# Patient Record
Sex: MALE | Race: WHITE | Employment: OTHER | URBAN - METROPOLITAN AREA
[De-identification: names, ages, dates, MRNs, and addresses within clinical notes are randomized per-mention and may not be internally consistent; named-entity substitution may affect disease eponyms.]

---

## 2023-07-12 ENCOUNTER — APPOINTMENT (OUTPATIENT)
Dept: GENERAL RADIOLOGY | Age: 83
End: 2023-07-12
Payer: MEDICARE

## 2023-07-12 ENCOUNTER — APPOINTMENT (OUTPATIENT)
Dept: CT IMAGING | Age: 83
End: 2023-07-12
Payer: MEDICARE

## 2023-07-12 ENCOUNTER — HOSPITAL ENCOUNTER (INPATIENT)
Age: 83
LOS: 11 days | Discharge: NURSING, REHAB OR LONG-TERM CARE | DRG: 280 | End: 2023-07-23
Attending: INTERNAL MEDICINE | Admitting: SURGERY
Payer: MEDICARE

## 2023-07-12 ENCOUNTER — HOSPITAL ENCOUNTER (EMERGENCY)
Age: 83
Discharge: ANOTHER HOSPITAL | End: 2023-07-12
Attending: EMERGENCY MEDICINE
Payer: MEDICARE

## 2023-07-12 VITALS
SYSTOLIC BLOOD PRESSURE: 129 MMHG | OXYGEN SATURATION: 98 % | WEIGHT: 140 LBS | HEIGHT: 72 IN | DIASTOLIC BLOOD PRESSURE: 90 MMHG | RESPIRATION RATE: 26 BRPM | BODY MASS INDEX: 18.96 KG/M2 | HEART RATE: 75 BPM | TEMPERATURE: 97.5 F

## 2023-07-12 DIAGNOSIS — R77.8 ELEVATED TROPONIN: Primary | ICD-10-CM

## 2023-07-12 DIAGNOSIS — J90 PLEURAL EFFUSION, BILATERAL: ICD-10-CM

## 2023-07-12 DIAGNOSIS — K92.2 ACUTE UPPER GI BLEED: ICD-10-CM

## 2023-07-12 PROBLEM — I50.9 HEART FAILURE (HCC): Status: ACTIVE | Noted: 2023-07-12

## 2023-07-12 PROBLEM — I50.43 CHF (CONGESTIVE HEART FAILURE), NYHA CLASS I, ACUTE ON CHRONIC, COMBINED (HCC): Status: ACTIVE | Noted: 2023-07-12

## 2023-07-12 LAB
ALBUMIN SERPL-MCNC: 3.7 G/DL (ref 3.4–5)
ALBUMIN/GLOB SERPL: 1.3 {RATIO} (ref 1.1–2.2)
ALP SERPL-CCNC: 104 U/L (ref 40–129)
ALT SERPL-CCNC: 73 U/L (ref 10–40)
ANION GAP SERPL CALCULATED.3IONS-SCNC: 14 MMOL/L (ref 3–16)
AST SERPL-CCNC: 85 U/L (ref 15–37)
BASOPHILS # BLD: 0.1 K/UL (ref 0–0.2)
BASOPHILS NFR BLD: 0.8 %
BILIRUB SERPL-MCNC: 0.5 MG/DL (ref 0–1)
BUN SERPL-MCNC: 32 MG/DL (ref 7–20)
CALCIUM SERPL-MCNC: 9 MG/DL (ref 8.3–10.6)
CHLORIDE SERPL-SCNC: 100 MMOL/L (ref 99–110)
CO2 SERPL-SCNC: 20 MMOL/L (ref 21–32)
CREAT SERPL-MCNC: 1.6 MG/DL (ref 0.8–1.3)
DEPRECATED RDW RBC AUTO: 14.7 % (ref 12.4–15.4)
EKG ATRIAL RATE: 366 BPM
EKG DIAGNOSIS: NORMAL
EKG Q-T INTERVAL: 344 MS
EKG QRS DURATION: 86 MS
EKG QTC CALCULATION (BAZETT): 454 MS
EKG R AXIS: -41 DEGREES
EKG T AXIS: 242 DEGREES
EKG VENTRICULAR RATE: 105 BPM
EOSINOPHIL # BLD: 0 K/UL (ref 0–0.6)
EOSINOPHIL NFR BLD: 0.4 %
GFR SERPLBLD CREATININE-BSD FMLA CKD-EPI: 43 ML/MIN/{1.73_M2}
GLUCOSE SERPL-MCNC: 130 MG/DL (ref 70–99)
HCT VFR BLD AUTO: 38.2 % (ref 40.5–52.5)
HGB BLD-MCNC: 12.6 G/DL (ref 13.5–17.5)
LYMPHOCYTES # BLD: 1 K/UL (ref 1–5.1)
LYMPHOCYTES NFR BLD: 12.9 %
MCH RBC QN AUTO: 29.9 PG (ref 26–34)
MCHC RBC AUTO-ENTMCNC: 33 G/DL (ref 31–36)
MCV RBC AUTO: 90.7 FL (ref 80–100)
MONOCYTES # BLD: 0.8 K/UL (ref 0–1.3)
MONOCYTES NFR BLD: 10.1 %
NEUTROPHILS # BLD: 5.9 K/UL (ref 1.7–7.7)
NEUTROPHILS NFR BLD: 75.8 %
PLATELET # BLD AUTO: 217 K/UL (ref 135–450)
PMV BLD AUTO: 9 FL (ref 5–10.5)
POTASSIUM SERPL-SCNC: 4.5 MMOL/L (ref 3.5–5.1)
PROT SERPL-MCNC: 6.6 G/DL (ref 6.4–8.2)
RBC # BLD AUTO: 4.21 M/UL (ref 4.2–5.9)
SODIUM SERPL-SCNC: 134 MMOL/L (ref 136–145)
TROPONIN, HIGH SENSITIVITY: 410 NG/L (ref 0–22)
TROPONIN, HIGH SENSITIVITY: 487 NG/L (ref 0–22)
WBC # BLD AUTO: 7.8 K/UL (ref 4–11)

## 2023-07-12 PROCEDURE — 71045 X-RAY EXAM CHEST 1 VIEW: CPT

## 2023-07-12 PROCEDURE — 96374 THER/PROPH/DIAG INJ IV PUSH: CPT

## 2023-07-12 PROCEDURE — 80053 COMPREHEN METABOLIC PANEL: CPT

## 2023-07-12 PROCEDURE — 6360000004 HC RX CONTRAST MEDICATION: Performed by: EMERGENCY MEDICINE

## 2023-07-12 PROCEDURE — 93005 ELECTROCARDIOGRAM TRACING: CPT | Performed by: EMERGENCY MEDICINE

## 2023-07-12 PROCEDURE — 84484 ASSAY OF TROPONIN QUANT: CPT

## 2023-07-12 PROCEDURE — 6360000002 HC RX W HCPCS: Performed by: EMERGENCY MEDICINE

## 2023-07-12 PROCEDURE — 71260 CT THORAX DX C+: CPT

## 2023-07-12 PROCEDURE — 1200000000 HC SEMI PRIVATE

## 2023-07-12 PROCEDURE — 2580000003 HC RX 258: Performed by: SURGERY

## 2023-07-12 PROCEDURE — 2060000000 HC ICU INTERMEDIATE R&B

## 2023-07-12 PROCEDURE — 36415 COLL VENOUS BLD VENIPUNCTURE: CPT

## 2023-07-12 PROCEDURE — 6370000000 HC RX 637 (ALT 250 FOR IP): Performed by: EMERGENCY MEDICINE

## 2023-07-12 PROCEDURE — 6370000000 HC RX 637 (ALT 250 FOR IP): Performed by: SURGERY

## 2023-07-12 PROCEDURE — 99285 EMERGENCY DEPT VISIT HI MDM: CPT

## 2023-07-12 PROCEDURE — 85025 COMPLETE CBC W/AUTO DIFF WBC: CPT

## 2023-07-12 PROCEDURE — 93010 ELECTROCARDIOGRAM REPORT: CPT | Performed by: INTERNAL MEDICINE

## 2023-07-12 RX ORDER — POTASSIUM CHLORIDE 20 MEQ/1
40 TABLET, EXTENDED RELEASE ORAL PRN
Status: DISCONTINUED | OUTPATIENT
Start: 2023-07-12 | End: 2023-07-23 | Stop reason: HOSPADM

## 2023-07-12 RX ORDER — FUROSEMIDE 10 MG/ML
40 INJECTION INTRAMUSCULAR; INTRAVENOUS ONCE
Status: COMPLETED | OUTPATIENT
Start: 2023-07-12 | End: 2023-07-12

## 2023-07-12 RX ORDER — ONDANSETRON 4 MG/1
4 TABLET, ORALLY DISINTEGRATING ORAL EVERY 8 HOURS PRN
Status: DISCONTINUED | OUTPATIENT
Start: 2023-07-12 | End: 2023-07-23 | Stop reason: HOSPADM

## 2023-07-12 RX ORDER — POLYETHYLENE GLYCOL 3350 17 G/17G
17 POWDER, FOR SOLUTION ORAL DAILY PRN
Status: DISCONTINUED | OUTPATIENT
Start: 2023-07-12 | End: 2023-07-23 | Stop reason: HOSPADM

## 2023-07-12 RX ORDER — POTASSIUM CHLORIDE 7.45 MG/ML
10 INJECTION INTRAVENOUS PRN
Status: DISCONTINUED | OUTPATIENT
Start: 2023-07-12 | End: 2023-07-12 | Stop reason: SDUPTHER

## 2023-07-12 RX ORDER — MAGNESIUM SULFATE IN WATER 40 MG/ML
2000 INJECTION, SOLUTION INTRAVENOUS PRN
Status: DISCONTINUED | OUTPATIENT
Start: 2023-07-12 | End: 2023-07-23 | Stop reason: HOSPADM

## 2023-07-12 RX ORDER — ACETAMINOPHEN 325 MG/1
650 TABLET ORAL EVERY 6 HOURS PRN
Status: DISCONTINUED | OUTPATIENT
Start: 2023-07-12 | End: 2023-07-23 | Stop reason: HOSPADM

## 2023-07-12 RX ORDER — 0.9 % SODIUM CHLORIDE 0.9 %
1000 INTRAVENOUS SOLUTION INTRAVENOUS ONCE
Status: DISCONTINUED | OUTPATIENT
Start: 2023-07-12 | End: 2023-07-12

## 2023-07-12 RX ORDER — ONDANSETRON 2 MG/ML
4 INJECTION INTRAMUSCULAR; INTRAVENOUS EVERY 6 HOURS PRN
Status: DISCONTINUED | OUTPATIENT
Start: 2023-07-12 | End: 2023-07-23 | Stop reason: HOSPADM

## 2023-07-12 RX ORDER — ONDANSETRON 4 MG/1
4 TABLET, ORALLY DISINTEGRATING ORAL EVERY 8 HOURS PRN
Status: DISCONTINUED | OUTPATIENT
Start: 2023-07-12 | End: 2023-07-12 | Stop reason: SDUPTHER

## 2023-07-12 RX ORDER — POTASSIUM CHLORIDE 7.45 MG/ML
10 INJECTION INTRAVENOUS PRN
Status: DISCONTINUED | OUTPATIENT
Start: 2023-07-12 | End: 2023-07-23 | Stop reason: HOSPADM

## 2023-07-12 RX ORDER — SODIUM CHLORIDE 0.9 % (FLUSH) 0.9 %
5-40 SYRINGE (ML) INJECTION EVERY 12 HOURS SCHEDULED
Status: DISCONTINUED | OUTPATIENT
Start: 2023-07-12 | End: 2023-07-23 | Stop reason: HOSPADM

## 2023-07-12 RX ORDER — ACETAMINOPHEN 650 MG/1
650 SUPPOSITORY RECTAL EVERY 6 HOURS PRN
Status: DISCONTINUED | OUTPATIENT
Start: 2023-07-12 | End: 2023-07-23 | Stop reason: HOSPADM

## 2023-07-12 RX ORDER — ASPIRIN 81 MG/1
324 TABLET, CHEWABLE ORAL ONCE
Status: COMPLETED | OUTPATIENT
Start: 2023-07-12 | End: 2023-07-12

## 2023-07-12 RX ORDER — FUROSEMIDE 10 MG/ML
40 INJECTION INTRAMUSCULAR; INTRAVENOUS 2 TIMES DAILY
Status: DISCONTINUED | OUTPATIENT
Start: 2023-07-13 | End: 2023-07-13

## 2023-07-12 RX ORDER — METOPROLOL TARTRATE 50 MG/1
50 TABLET, FILM COATED ORAL ONCE
Status: COMPLETED | OUTPATIENT
Start: 2023-07-12 | End: 2023-07-12

## 2023-07-12 RX ORDER — ASPIRIN 81 MG/1
81 TABLET, CHEWABLE ORAL DAILY
Status: ON HOLD | COMMUNITY

## 2023-07-12 RX ORDER — POTASSIUM CHLORIDE 20 MEQ/1
40 TABLET, EXTENDED RELEASE ORAL PRN
Status: DISCONTINUED | OUTPATIENT
Start: 2023-07-12 | End: 2023-07-12 | Stop reason: SDUPTHER

## 2023-07-12 RX ORDER — MAGNESIUM SULFATE IN WATER 40 MG/ML
2000 INJECTION, SOLUTION INTRAVENOUS PRN
Status: DISCONTINUED | OUTPATIENT
Start: 2023-07-12 | End: 2023-07-12 | Stop reason: SDUPTHER

## 2023-07-12 RX ORDER — ONDANSETRON 2 MG/ML
4 INJECTION INTRAMUSCULAR; INTRAVENOUS EVERY 6 HOURS PRN
Status: DISCONTINUED | OUTPATIENT
Start: 2023-07-12 | End: 2023-07-12 | Stop reason: SDUPTHER

## 2023-07-12 RX ORDER — SODIUM CHLORIDE 0.9 % (FLUSH) 0.9 %
5-40 SYRINGE (ML) INJECTION PRN
Status: DISCONTINUED | OUTPATIENT
Start: 2023-07-12 | End: 2023-07-23 | Stop reason: HOSPADM

## 2023-07-12 RX ORDER — SODIUM CHLORIDE 9 MG/ML
INJECTION, SOLUTION INTRAVENOUS PRN
Status: DISCONTINUED | OUTPATIENT
Start: 2023-07-12 | End: 2023-07-23 | Stop reason: HOSPADM

## 2023-07-12 RX ADMIN — ASPIRIN 324 MG: 81 TABLET, CHEWABLE ORAL at 14:42

## 2023-07-12 RX ADMIN — FUROSEMIDE 40 MG: 10 INJECTION, SOLUTION INTRAMUSCULAR; INTRAVENOUS at 16:54

## 2023-07-12 RX ADMIN — IOMEPROL INJECTION 75 ML: 714 INJECTION, SOLUTION INTRAVASCULAR at 15:17

## 2023-07-12 RX ADMIN — APIXABAN 2.5 MG: 2.5 TABLET, FILM COATED ORAL at 23:49

## 2023-07-12 RX ADMIN — SODIUM CHLORIDE, PRESERVATIVE FREE 10 ML: 5 INJECTION INTRAVENOUS at 23:50

## 2023-07-12 RX ADMIN — METOPROLOL TARTRATE 50 MG: 50 TABLET, FILM COATED ORAL at 14:42

## 2023-07-12 ASSESSMENT — ENCOUNTER SYMPTOMS
VOMITING: 0
WHEEZING: 0
BACK PAIN: 0
VOICE CHANGE: 0
COLOR CHANGE: 0
STRIDOR: 0
NAUSEA: 0
BLOOD IN STOOL: 0
ABDOMINAL PAIN: 0
FACIAL SWELLING: 0
TROUBLE SWALLOWING: 0
SHORTNESS OF BREATH: 1
PHOTOPHOBIA: 0

## 2023-07-12 ASSESSMENT — PAIN SCALES - GENERAL
PAINLEVEL_OUTOF10: 0
PAINLEVEL_OUTOF10: 0

## 2023-07-12 ASSESSMENT — PAIN - FUNCTIONAL ASSESSMENT: PAIN_FUNCTIONAL_ASSESSMENT: NONE - DENIES PAIN

## 2023-07-12 NOTE — ED PROVIDER NOTES
[facility]  eMERGENCY dEPARTMENT eNCOUnter      Pt Name: Dexter Adair  MRN: [id number]  [facility] ([place] 1940  Date of evaluation: 7/12/2023  Provider: Calixto Wellington MD    CHIEF COMPLAINT       Chief Complaint   Patient presents with    Shortness of Breath     Pt states SOB x1 week. Pt states nothing makes it better or worse. Pt states that he has stopped smoking within the past 5 days. 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 \"     HISTORY OF PRESENT ILLNESS   (Location/Symptom, Timing/Onset, Context/Setting, Quality, Duration, Modifying Factors, Severity)  Note limiting factors. History obtained from: the patient    Dexter Adair is a 80 y.o. male who reports he does not go to the doctor and therefore does not have any diagnosed medical conditions and does not take any medications who presents for over 1 year of slowly progressing shortness of breath on exertion however has significantly worsened within the past week. Patient reports exertion such as walking seems to worsen shortness of breath. Patient denies any chest pain. Patient has any leg swelling or hemoptysis. HPI    Nursing Notes were reviewed. REVIEW OFSYSTEMS    (2-9 systems for level 4, 10 or more for level 5)     Review of Systems   Constitutional:  Positive for fatigue. Negative for appetite change, fever and unexpected weight change. HENT:  Negative for facial swelling, trouble swallowing and voice change. Eyes:  Negative for photophobia and visual disturbance. Respiratory:  Positive for shortness of breath. Negative for wheezing and stridor. Cardiovascular:  Negative for chest pain and palpitations. Gastrointestinal:  Negative for abdominal pain, blood in stool, nausea and vomiting. Genitourinary:  Negative for difficulty urinating and dysuria. Musculoskeletal:  Negative for back pain, gait problem and neck pain.    Skin:  Negative for color

## 2023-07-12 NOTE — ED NOTES
Malvin Hartman at bedside states she is taking patient's car keys and car home.       Olivia Gomez RN  07/12/23 2001

## 2023-07-13 LAB
ANION GAP SERPL CALCULATED.3IONS-SCNC: 12 MMOL/L (ref 3–16)
ANTI-XA UNFRAC HEPARIN: 1.05 IU/ML (ref 0.3–0.7)
APTT BLD: 30.6 SEC (ref 22.7–35.9)
BUN SERPL-MCNC: 37 MG/DL (ref 7–20)
CALCIUM SERPL-MCNC: 9.1 MG/DL (ref 8.3–10.6)
CHLORIDE SERPL-SCNC: 100 MMOL/L (ref 99–110)
CHOLEST SERPL-MCNC: 144 MG/DL (ref 0–199)
CO2 SERPL-SCNC: 21 MMOL/L (ref 21–32)
CREAT SERPL-MCNC: 1.7 MG/DL (ref 0.8–1.3)
GFR SERPLBLD CREATININE-BSD FMLA CKD-EPI: 40 ML/MIN/{1.73_M2}
GLUCOSE SERPL-MCNC: 180 MG/DL (ref 70–99)
HDLC SERPL-MCNC: 47 MG/DL (ref 40–60)
INR PPP: 1.13 (ref 0.84–1.16)
LDLC SERPL CALC-MCNC: 81 MG/DL
LV EF: 18 %
LVEF MODALITY: NORMAL
MAGNESIUM SERPL-MCNC: 1.9 MG/DL (ref 1.8–2.4)
NT-PROBNP SERPL-MCNC: ABNORMAL PG/ML (ref 0–449)
NT-PROBNP SERPL-MCNC: ABNORMAL PG/ML (ref 0–449)
POTASSIUM SERPL-SCNC: 4.6 MMOL/L (ref 3.5–5.1)
PROTHROMBIN TIME: 14.5 SEC (ref 11.5–14.8)
SODIUM SERPL-SCNC: 133 MMOL/L (ref 136–145)
T4 FREE SERPL-MCNC: 1.6 NG/DL (ref 0.9–1.8)
TRIGL SERPL-MCNC: 78 MG/DL (ref 0–150)
TSH SERPL DL<=0.005 MIU/L-ACNC: 5.4 UIU/ML (ref 0.27–4.2)
VLDLC SERPL CALC-MCNC: 16 MG/DL

## 2023-07-13 PROCEDURE — 85520 HEPARIN ASSAY: CPT

## 2023-07-13 PROCEDURE — 80048 BASIC METABOLIC PNL TOTAL CA: CPT

## 2023-07-13 PROCEDURE — 84443 ASSAY THYROID STIM HORMONE: CPT

## 2023-07-13 PROCEDURE — 85730 THROMBOPLASTIN TIME PARTIAL: CPT

## 2023-07-13 PROCEDURE — 84439 ASSAY OF FREE THYROXINE: CPT

## 2023-07-13 PROCEDURE — 83735 ASSAY OF MAGNESIUM: CPT

## 2023-07-13 PROCEDURE — 6370000000 HC RX 637 (ALT 250 FOR IP): Performed by: SURGERY

## 2023-07-13 PROCEDURE — 6360000002 HC RX W HCPCS: Performed by: INTERNAL MEDICINE

## 2023-07-13 PROCEDURE — 80061 LIPID PANEL: CPT

## 2023-07-13 PROCEDURE — 36415 COLL VENOUS BLD VENIPUNCTURE: CPT

## 2023-07-13 PROCEDURE — 83880 ASSAY OF NATRIURETIC PEPTIDE: CPT

## 2023-07-13 PROCEDURE — 94761 N-INVAS EAR/PLS OXIMETRY MLT: CPT

## 2023-07-13 PROCEDURE — 6360000004 HC RX CONTRAST MEDICATION: Performed by: SURGERY

## 2023-07-13 PROCEDURE — 1200000000 HC SEMI PRIVATE

## 2023-07-13 PROCEDURE — 85610 PROTHROMBIN TIME: CPT

## 2023-07-13 PROCEDURE — 2580000003 HC RX 258: Performed by: SURGERY

## 2023-07-13 PROCEDURE — 6370000000 HC RX 637 (ALT 250 FOR IP): Performed by: INTERNAL MEDICINE

## 2023-07-13 PROCEDURE — 94640 AIRWAY INHALATION TREATMENT: CPT

## 2023-07-13 PROCEDURE — C8929 TTE W OR WO FOL WCON,DOPPLER: HCPCS

## 2023-07-13 PROCEDURE — 6360000002 HC RX W HCPCS: Performed by: SURGERY

## 2023-07-13 RX ORDER — HEPARIN SODIUM 1000 [USP'U]/ML
30 INJECTION, SOLUTION INTRAVENOUS; SUBCUTANEOUS PRN
Status: DISCONTINUED | OUTPATIENT
Start: 2023-07-13 | End: 2023-07-18

## 2023-07-13 RX ORDER — FUROSEMIDE 10 MG/ML
40 INJECTION INTRAMUSCULAR; INTRAVENOUS 2 TIMES DAILY
Status: DISCONTINUED | OUTPATIENT
Start: 2023-07-13 | End: 2023-07-13

## 2023-07-13 RX ORDER — IPRATROPIUM BROMIDE AND ALBUTEROL SULFATE 2.5; .5 MG/3ML; MG/3ML
1 SOLUTION RESPIRATORY (INHALATION)
Status: DISCONTINUED | OUTPATIENT
Start: 2023-07-13 | End: 2023-07-16

## 2023-07-13 RX ORDER — HEPARIN SODIUM 1000 [USP'U]/ML
60 INJECTION, SOLUTION INTRAVENOUS; SUBCUTANEOUS PRN
Status: DISCONTINUED | OUTPATIENT
Start: 2023-07-13 | End: 2023-07-18

## 2023-07-13 RX ORDER — ASPIRIN 81 MG/1
81 TABLET, CHEWABLE ORAL DAILY
Status: DISCONTINUED | OUTPATIENT
Start: 2023-07-14 | End: 2023-07-23 | Stop reason: HOSPADM

## 2023-07-13 RX ORDER — HEPARIN SODIUM 1000 [USP'U]/ML
60 INJECTION, SOLUTION INTRAVENOUS; SUBCUTANEOUS ONCE
Status: COMPLETED | OUTPATIENT
Start: 2023-07-13 | End: 2023-07-13

## 2023-07-13 RX ORDER — AZITHROMYCIN 250 MG/1
500 TABLET, FILM COATED ORAL DAILY
Status: DISCONTINUED | OUTPATIENT
Start: 2023-07-13 | End: 2023-07-13

## 2023-07-13 RX ORDER — HEPARIN SODIUM 10000 [USP'U]/100ML
5-30 INJECTION, SOLUTION INTRAVENOUS CONTINUOUS
Status: DISCONTINUED | OUTPATIENT
Start: 2023-07-13 | End: 2023-07-18

## 2023-07-13 RX ORDER — PREDNISONE 20 MG/1
40 TABLET ORAL DAILY
Status: COMPLETED | OUTPATIENT
Start: 2023-07-13 | End: 2023-07-17

## 2023-07-13 RX ORDER — ATORVASTATIN CALCIUM 40 MG/1
40 TABLET, FILM COATED ORAL NIGHTLY
Status: DISCONTINUED | OUTPATIENT
Start: 2023-07-13 | End: 2023-07-23 | Stop reason: HOSPADM

## 2023-07-13 RX ADMIN — HEPARIN SODIUM 12 UNITS/KG/HR: 10000 INJECTION, SOLUTION INTRAVENOUS at 21:44

## 2023-07-13 RX ADMIN — FUROSEMIDE 40 MG: 10 INJECTION, SOLUTION INTRAMUSCULAR; INTRAVENOUS at 10:47

## 2023-07-13 RX ADMIN — FUROSEMIDE 40 MG: 10 INJECTION, SOLUTION INTRAMUSCULAR; INTRAVENOUS at 18:54

## 2023-07-13 RX ADMIN — APIXABAN 2.5 MG: 2.5 TABLET, FILM COATED ORAL at 08:08

## 2023-07-13 RX ADMIN — IPRATROPIUM BROMIDE AND ALBUTEROL SULFATE 1 DOSE: 2.5; .5 SOLUTION RESPIRATORY (INHALATION) at 11:47

## 2023-07-13 RX ADMIN — METOPROLOL TARTRATE 25 MG: 25 TABLET, FILM COATED ORAL at 21:51

## 2023-07-13 RX ADMIN — AZITHROMYCIN DIHYDRATE 500 MG: 250 TABLET ORAL at 08:08

## 2023-07-13 RX ADMIN — IPRATROPIUM BROMIDE AND ALBUTEROL SULFATE 1 DOSE: 2.5; .5 SOLUTION RESPIRATORY (INHALATION) at 21:40

## 2023-07-13 RX ADMIN — ATORVASTATIN CALCIUM 40 MG: 40 TABLET, FILM COATED ORAL at 21:29

## 2023-07-13 RX ADMIN — SODIUM CHLORIDE, PRESERVATIVE FREE 10 ML: 5 INJECTION INTRAVENOUS at 21:56

## 2023-07-13 RX ADMIN — HEPARIN SODIUM 3930 UNITS: 1000 INJECTION INTRAVENOUS; SUBCUTANEOUS at 21:44

## 2023-07-13 RX ADMIN — PERFLUTREN 1.5 ML: 6.52 INJECTION, SUSPENSION INTRAVENOUS at 16:00

## 2023-07-13 RX ADMIN — SODIUM CHLORIDE, PRESERVATIVE FREE 10 ML: 5 INJECTION INTRAVENOUS at 10:52

## 2023-07-13 RX ADMIN — PREDNISONE 40 MG: 20 TABLET ORAL at 08:09

## 2023-07-13 RX ADMIN — IPRATROPIUM BROMIDE AND ALBUTEROL SULFATE 1 DOSE: 2.5; .5 SOLUTION RESPIRATORY (INHALATION) at 16:20

## 2023-07-13 ASSESSMENT — PAIN SCALES - GENERAL
PAINLEVEL_OUTOF10: 0

## 2023-07-13 NOTE — H&P
V2.0  History and Physical      Name:  Fritz Adler /Age/Sex: 1940  (80 y.o. male)   MRN & CSN:  [id number] & [id number] Encounter Date/Time: 2023 1:53 AM EDT   Location:  [facility] PCP: No primary care provider on file. Hospital Day: 2    Assessment and Plan:       Hospital Problems             Last Modified POA    * (Principal) CHF (congestive heart failure), NYHA class I, acute on chronic, combined ([address]) 2023 Yes    Heart failure ([address]) 2023 Yes       Assessment/Plan:    Dyspnea  Suspected COPD exacerbation  Heavy smoking hx  Suspected CHF  New onset afib  - 40 pack year smoking hx at least  - pursed lipped breathing on minimal exertion  - endorsing cough productive of greenish sputum  - endorsing chills/sweats as well  - prednisone and azithro with Bds while awake   - f/u echo  - Lasix 40 IV BID  - Eliquis 2.5 mg BID    Suspected MICHAEL  - uncertain of baseline  - reduced dose Eliquis for afib given age and creatinine elevation  - monitor renal function with diuresis    Uptrending troponin  - 410 to 487 in setting of elevated creatinine and no chest pain  - ECG with aflutter and nonspeacific ST changes  - monitor for chest pain    Chronic: none reported since hasn't been to doctor in decades        Diet ADULT DIET; Dysphagia - Soft and Bite Sized;  Low Sodium (2 gm); 1800 ml   DVT Prophylaxis [] Lovenox, []  Heparin, [] SCDs, [] Ambulation,  [] Eliquis, [] Xarelto, [] Coumadin   Code Status Full Code         Personally reviewed Lab Studies and Imaging     Discussed management of the case with ED physician who recommended admission for suspected CHF vs COPD exacerbation    EKG interpreted personally and results indicating afib    Imaging that was interpreted personally includes CXR and results indicating mild pulmonary congestion and findings of emphysema    Drugs that require monitoring for toxicity include none and the method of monitoring was n/a        History from:

## 2023-07-13 NOTE — ED NOTES
Verbal report called to [facility] nurse at this time.  All questions answered     Lori Biggs RN  07/12/23 2762

## 2023-07-13 NOTE — ED NOTES
Pt had approx 3 second run of vtach at 173 BPM. Pt resting in bed sleeping and in no distress. Pt alert and oriented. MD made aware. Pt remains in NSR currently.       Lolis Plummer RN  07/12/23 6631

## 2023-07-13 NOTE — ED NOTES
Verbal handoff report given to [facility], POC transferred at this time. All question answered, patient stable without distress.        Haroldo Hair RN  07/12/23 8523

## 2023-07-13 NOTE — DISCHARGE INSTRUCTIONS
Extra Heart Failure sites:     https://BellaDatiitalLiquor.com. com/publication/?a=430614   --- this is American Heart Association interactive Healthier Living with Heart Failure guidebook. Please click hyperlink or copy / paste link into search bar. Use your mouse to scroll through the pages. Lots of information about weight monitoring, diet tips, activity, meds, etc    HF Jennings didi  -- this is a free smart phone didi available for iPhone and Android download. Use your phone to track sodium / fluid intake, zone tool symptom tracking, weights, medications, etc. Click on this hyperlink  HF Jennings Didi   for QR code for easy download. DASH (Dietary Approach to Stop Hypertension) diet --  SeekAlumni.no -- this diet is a flexible eating plan that promotes heart healthy eating style. Click on hyperlink or copy / paste link into search bar. Lots of low sodium recipes and tips. CigarRepair.ca  -- more free recipes      Patient instructions for CAM monitor: You will need to wear monitor for 2 weeks. Mail monitor back or return to office on following date. Remove date:  [facility]  [address]  [address] [place] [address],[address]  [phone]         Make sure to save box as you will place monitor back in postage paid box to return monitor to office or return in mail. After removing monitor stick it to template provided, place both your log and monitor in box and return to office or place in [address] [place] [address]. If monitor comes off but has been in place at least 7 days place in box and mail back. If it comes off sooner than 7 days you will have to call office and return to have it replaced. Avoid excess sweating to maximize wear time. You are able to shower after 24 hours, however have majority of water hitting back and not directly on monitor. Do not submerge in bath.            If you experience any

## 2023-07-14 ENCOUNTER — APPOINTMENT (OUTPATIENT)
Dept: CT IMAGING | Age: 83
DRG: 280 | End: 2023-07-14
Attending: INTERNAL MEDICINE
Payer: MEDICARE

## 2023-07-14 ENCOUNTER — APPOINTMENT (OUTPATIENT)
Dept: ULTRASOUND IMAGING | Age: 83
DRG: 280 | End: 2023-07-14
Attending: INTERNAL MEDICINE
Payer: MEDICARE

## 2023-07-14 PROBLEM — N13.30 HYDRONEPHROSIS: Status: ACTIVE | Noted: 2023-07-14

## 2023-07-14 PROBLEM — I50.22 CHRONIC SYSTOLIC (CONGESTIVE) HEART FAILURE (HCC): Status: ACTIVE | Noted: 2023-07-12

## 2023-07-14 PROBLEM — N17.9 AKI (ACUTE KIDNEY INJURY) (HCC): Status: ACTIVE | Noted: 2023-07-14

## 2023-07-14 PROBLEM — I50.23 ACUTE ON CHRONIC SYSTOLIC (CONGESTIVE) HEART FAILURE (HCC): Status: ACTIVE | Noted: 2023-07-12

## 2023-07-14 PROBLEM — I48.0 PAROXYSMAL ATRIAL FIBRILLATION (HCC): Status: ACTIVE | Noted: 2023-07-14

## 2023-07-14 PROBLEM — I25.5 ISCHEMIC CARDIOMYOPATHY: Status: ACTIVE | Noted: 2023-07-14

## 2023-07-14 PROBLEM — I15.9 SECONDARY HYPERTENSION: Status: ACTIVE | Noted: 2023-07-14

## 2023-07-14 PROBLEM — E87.8 ELECTROLYTE IMBALANCE: Status: ACTIVE | Noted: 2023-07-14

## 2023-07-14 LAB
ANION GAP SERPL CALCULATED.3IONS-SCNC: 12 MMOL/L (ref 3–16)
ANTI-XA UNFRAC HEPARIN: 0.69 IU/ML (ref 0.3–0.7)
ANTI-XA UNFRAC HEPARIN: >1.1 IU/ML (ref 0.3–0.7)
ANTI-XA UNFRAC HEPARIN: >1.1 IU/ML (ref 0.3–0.7)
APTT BLD: 65.6 SEC (ref 22.7–35.9)
APTT BLD: 72.3 SEC (ref 22.7–35.9)
BUN SERPL-MCNC: 40 MG/DL (ref 7–20)
CALCIUM SERPL-MCNC: 8.7 MG/DL (ref 8.3–10.6)
CHLORIDE SERPL-SCNC: 97 MMOL/L (ref 99–110)
CO2 SERPL-SCNC: 24 MMOL/L (ref 21–32)
CREAT SERPL-MCNC: 1.8 MG/DL (ref 0.8–1.3)
DEPRECATED RDW RBC AUTO: 14.5 % (ref 12.4–15.4)
EKG ATRIAL RATE: 58 BPM
EKG DIAGNOSIS: NORMAL
EKG P AXIS: 47 DEGREES
EKG P-R INTERVAL: 140 MS
EKG Q-T INTERVAL: 460 MS
EKG QRS DURATION: 98 MS
EKG QTC CALCULATION (BAZETT): 451 MS
EKG R AXIS: -44 DEGREES
EKG T AXIS: 255 DEGREES
EKG VENTRICULAR RATE: 58 BPM
EST. AVERAGE GLUCOSE BLD GHB EST-MCNC: 119.8 MG/DL
GFR SERPLBLD CREATININE-BSD FMLA CKD-EPI: 37 ML/MIN/{1.73_M2}
GLUCOSE SERPL-MCNC: 117 MG/DL (ref 70–99)
HBA1C MFR BLD: 5.8 %
HCT VFR BLD AUTO: 34.6 % (ref 40.5–52.5)
HGB BLD-MCNC: 11.9 G/DL (ref 13.5–17.5)
MAGNESIUM SERPL-MCNC: 1.8 MG/DL (ref 1.8–2.4)
MCH RBC QN AUTO: 30.9 PG (ref 26–34)
MCHC RBC AUTO-ENTMCNC: 34.5 G/DL (ref 31–36)
MCV RBC AUTO: 89.7 FL (ref 80–100)
PLATELET # BLD AUTO: 199 K/UL (ref 135–450)
PMV BLD AUTO: 9.1 FL (ref 5–10.5)
POTASSIUM SERPL-SCNC: 3.7 MMOL/L (ref 3.5–5.1)
RBC # BLD AUTO: 3.86 M/UL (ref 4.2–5.9)
SODIUM SERPL-SCNC: 133 MMOL/L (ref 136–145)
WBC # BLD AUTO: 8.3 K/UL (ref 4–11)

## 2023-07-14 PROCEDURE — 36415 COLL VENOUS BLD VENIPUNCTURE: CPT

## 2023-07-14 PROCEDURE — 74176 CT ABD & PELVIS W/O CONTRAST: CPT

## 2023-07-14 PROCEDURE — 99222 1ST HOSP IP/OBS MODERATE 55: CPT | Performed by: INTERNAL MEDICINE

## 2023-07-14 PROCEDURE — 94761 N-INVAS EAR/PLS OXIMETRY MLT: CPT

## 2023-07-14 PROCEDURE — 99223 1ST HOSP IP/OBS HIGH 75: CPT | Performed by: INTERNAL MEDICINE

## 2023-07-14 PROCEDURE — 93005 ELECTROCARDIOGRAM TRACING: CPT | Performed by: INTERNAL MEDICINE

## 2023-07-14 PROCEDURE — 76770 US EXAM ABDO BACK WALL COMP: CPT

## 2023-07-14 PROCEDURE — 6370000000 HC RX 637 (ALT 250 FOR IP): Performed by: SURGERY

## 2023-07-14 PROCEDURE — 83036 HEMOGLOBIN GLYCOSYLATED A1C: CPT

## 2023-07-14 PROCEDURE — 85027 COMPLETE CBC AUTOMATED: CPT

## 2023-07-14 PROCEDURE — 94640 AIRWAY INHALATION TREATMENT: CPT

## 2023-07-14 PROCEDURE — 1200000000 HC SEMI PRIVATE

## 2023-07-14 PROCEDURE — 83735 ASSAY OF MAGNESIUM: CPT

## 2023-07-14 PROCEDURE — 85730 THROMBOPLASTIN TIME PARTIAL: CPT

## 2023-07-14 PROCEDURE — 93010 ELECTROCARDIOGRAM REPORT: CPT | Performed by: INTERNAL MEDICINE

## 2023-07-14 PROCEDURE — 6370000000 HC RX 637 (ALT 250 FOR IP): Performed by: INTERNAL MEDICINE

## 2023-07-14 PROCEDURE — 80048 BASIC METABOLIC PNL TOTAL CA: CPT

## 2023-07-14 PROCEDURE — 85520 HEPARIN ASSAY: CPT

## 2023-07-14 PROCEDURE — 6360000002 HC RX W HCPCS: Performed by: INTERNAL MEDICINE

## 2023-07-14 RX ORDER — FUROSEMIDE 10 MG/ML
40 INJECTION INTRAMUSCULAR; INTRAVENOUS 2 TIMES DAILY
Status: DISCONTINUED | OUTPATIENT
Start: 2023-07-14 | End: 2023-07-15

## 2023-07-14 RX ORDER — METOPROLOL SUCCINATE 25 MG/1
25 TABLET, EXTENDED RELEASE ORAL DAILY
Status: DISCONTINUED | OUTPATIENT
Start: 2023-07-14 | End: 2023-07-23 | Stop reason: HOSPADM

## 2023-07-14 RX ORDER — METOPROLOL SUCCINATE 25 MG/1
25 TABLET, EXTENDED RELEASE ORAL DAILY
Status: DISCONTINUED | OUTPATIENT
Start: 2023-07-14 | End: 2023-07-14

## 2023-07-14 RX ADMIN — ASPIRIN 81 MG 81 MG: 81 TABLET ORAL at 10:19

## 2023-07-14 RX ADMIN — FUROSEMIDE 40 MG: 10 INJECTION, SOLUTION INTRAMUSCULAR; INTRAVENOUS at 17:33

## 2023-07-14 RX ADMIN — ATORVASTATIN CALCIUM 40 MG: 40 TABLET, FILM COATED ORAL at 19:54

## 2023-07-14 RX ADMIN — METOPROLOL TARTRATE 25 MG: 25 TABLET, FILM COATED ORAL at 10:19

## 2023-07-14 RX ADMIN — FUROSEMIDE 40 MG: 10 INJECTION, SOLUTION INTRAMUSCULAR; INTRAVENOUS at 12:44

## 2023-07-14 RX ADMIN — IPRATROPIUM BROMIDE AND ALBUTEROL SULFATE 1 DOSE: 2.5; .5 SOLUTION RESPIRATORY (INHALATION) at 08:07

## 2023-07-14 RX ADMIN — PREDNISONE 40 MG: 20 TABLET ORAL at 10:19

## 2023-07-14 RX ADMIN — IPRATROPIUM BROMIDE AND ALBUTEROL SULFATE 1 DOSE: 2.5; .5 SOLUTION RESPIRATORY (INHALATION) at 15:11

## 2023-07-14 RX ADMIN — HEPARIN SODIUM 1970 UNITS: 1000 INJECTION INTRAVENOUS; SUBCUTANEOUS at 04:19

## 2023-07-14 RX ADMIN — HEPARIN SODIUM 1970 UNITS: 1000 INJECTION INTRAVENOUS; SUBCUTANEOUS at 20:39

## 2023-07-14 RX ADMIN — METOPROLOL SUCCINATE 25 MG: 25 TABLET, EXTENDED RELEASE ORAL at 17:57

## 2023-07-14 RX ADMIN — IPRATROPIUM BROMIDE AND ALBUTEROL SULFATE 1 DOSE: 2.5; .5 SOLUTION RESPIRATORY (INHALATION) at 21:56

## 2023-07-14 ASSESSMENT — PAIN SCALES - GENERAL
PAINLEVEL_OUTOF10: 0

## 2023-07-15 PROBLEM — I27.20 PULMONARY HTN (HCC): Status: ACTIVE | Noted: 2023-07-15

## 2023-07-15 PROBLEM — I50.41 ACUTE COMBINED SYSTOLIC (CONGESTIVE) AND DIASTOLIC (CONGESTIVE) HEART FAILURE (HCC): Status: ACTIVE | Noted: 2023-07-15

## 2023-07-15 PROBLEM — R79.89 ELEVATED TROPONIN: Status: ACTIVE | Noted: 2023-07-15

## 2023-07-15 PROBLEM — I10 PRIMARY HYPERTENSION: Status: ACTIVE | Noted: 2023-07-15

## 2023-07-15 PROBLEM — I34.0 MODERATE MITRAL REGURGITATION: Status: ACTIVE | Noted: 2023-07-15

## 2023-07-15 PROBLEM — R77.8 ELEVATED TROPONIN: Status: ACTIVE | Noted: 2023-07-15

## 2023-07-15 LAB
ANION GAP SERPL CALCULATED.3IONS-SCNC: 9 MMOL/L (ref 3–16)
APTT BLD: 116.4 SEC (ref 22.7–35.9)
APTT BLD: 63.8 SEC (ref 22.7–35.9)
APTT BLD: 86.8 SEC (ref 22.7–35.9)
BUN SERPL-MCNC: 49 MG/DL (ref 7–20)
CALCIUM SERPL-MCNC: 9 MG/DL (ref 8.3–10.6)
CHLORIDE SERPL-SCNC: 99 MMOL/L (ref 99–110)
CO2 SERPL-SCNC: 29 MMOL/L (ref 21–32)
CREAT SERPL-MCNC: 2 MG/DL (ref 0.8–1.3)
DEPRECATED RDW RBC AUTO: 14.8 % (ref 12.4–15.4)
GFR SERPLBLD CREATININE-BSD FMLA CKD-EPI: 33 ML/MIN/{1.73_M2}
GLUCOSE SERPL-MCNC: 115 MG/DL (ref 70–99)
HCT VFR BLD AUTO: 34.6 % (ref 40.5–52.5)
HGB BLD-MCNC: 11.9 G/DL (ref 13.5–17.5)
MAGNESIUM SERPL-MCNC: 1.9 MG/DL (ref 1.8–2.4)
MCH RBC QN AUTO: 31.1 PG (ref 26–34)
MCHC RBC AUTO-ENTMCNC: 34.3 G/DL (ref 31–36)
MCV RBC AUTO: 90.7 FL (ref 80–100)
PLATELET # BLD AUTO: 203 K/UL (ref 135–450)
PMV BLD AUTO: 8.9 FL (ref 5–10.5)
POTASSIUM SERPL-SCNC: 3.9 MMOL/L (ref 3.5–5.1)
RBC # BLD AUTO: 3.82 M/UL (ref 4.2–5.9)
SODIUM SERPL-SCNC: 137 MMOL/L (ref 136–145)
WBC # BLD AUTO: 7.7 K/UL (ref 4–11)

## 2023-07-15 PROCEDURE — 99232 SBSQ HOSP IP/OBS MODERATE 35: CPT | Performed by: NURSE PRACTITIONER

## 2023-07-15 PROCEDURE — 94761 N-INVAS EAR/PLS OXIMETRY MLT: CPT

## 2023-07-15 PROCEDURE — 97530 THERAPEUTIC ACTIVITIES: CPT

## 2023-07-15 PROCEDURE — 6360000002 HC RX W HCPCS: Performed by: INTERNAL MEDICINE

## 2023-07-15 PROCEDURE — 85730 THROMBOPLASTIN TIME PARTIAL: CPT

## 2023-07-15 PROCEDURE — 36415 COLL VENOUS BLD VENIPUNCTURE: CPT

## 2023-07-15 PROCEDURE — 94640 AIRWAY INHALATION TREATMENT: CPT

## 2023-07-15 PROCEDURE — 97116 GAIT TRAINING THERAPY: CPT

## 2023-07-15 PROCEDURE — 80048 BASIC METABOLIC PNL TOTAL CA: CPT

## 2023-07-15 PROCEDURE — 1200000000 HC SEMI PRIVATE

## 2023-07-15 PROCEDURE — 85027 COMPLETE CBC AUTOMATED: CPT

## 2023-07-15 PROCEDURE — 97161 PT EVAL LOW COMPLEX 20 MIN: CPT

## 2023-07-15 PROCEDURE — 99233 SBSQ HOSP IP/OBS HIGH 50: CPT | Performed by: INTERNAL MEDICINE

## 2023-07-15 PROCEDURE — 83735 ASSAY OF MAGNESIUM: CPT

## 2023-07-15 PROCEDURE — 6370000000 HC RX 637 (ALT 250 FOR IP): Performed by: SURGERY

## 2023-07-15 PROCEDURE — 6370000000 HC RX 637 (ALT 250 FOR IP): Performed by: INTERNAL MEDICINE

## 2023-07-15 PROCEDURE — 97165 OT EVAL LOW COMPLEX 30 MIN: CPT

## 2023-07-15 RX ORDER — FUROSEMIDE 10 MG/ML
20 INJECTION INTRAMUSCULAR; INTRAVENOUS 2 TIMES DAILY
Status: COMPLETED | OUTPATIENT
Start: 2023-07-15 | End: 2023-07-15

## 2023-07-15 RX ADMIN — FUROSEMIDE 20 MG: 10 INJECTION, SOLUTION INTRAMUSCULAR; INTRAVENOUS at 18:16

## 2023-07-15 RX ADMIN — IPRATROPIUM BROMIDE AND ALBUTEROL SULFATE 1 DOSE: 2.5; .5 SOLUTION RESPIRATORY (INHALATION) at 09:43

## 2023-07-15 RX ADMIN — PREDNISONE 40 MG: 20 TABLET ORAL at 10:12

## 2023-07-15 RX ADMIN — IPRATROPIUM BROMIDE AND ALBUTEROL SULFATE 1 DOSE: 2.5; .5 SOLUTION RESPIRATORY (INHALATION) at 16:25

## 2023-07-15 RX ADMIN — FUROSEMIDE 40 MG: 10 INJECTION, SOLUTION INTRAMUSCULAR; INTRAVENOUS at 10:12

## 2023-07-15 RX ADMIN — ASPIRIN 81 MG 81 MG: 81 TABLET ORAL at 10:12

## 2023-07-15 RX ADMIN — HEPARIN SODIUM 18 UNITS/KG/HR: 10000 INJECTION, SOLUTION INTRAVENOUS at 00:49

## 2023-07-15 RX ADMIN — IPRATROPIUM BROMIDE AND ALBUTEROL SULFATE 1 DOSE: 2.5; .5 SOLUTION RESPIRATORY (INHALATION) at 20:36

## 2023-07-15 RX ADMIN — METOPROLOL SUCCINATE 25 MG: 25 TABLET, EXTENDED RELEASE ORAL at 10:12

## 2023-07-15 RX ADMIN — ATORVASTATIN CALCIUM 40 MG: 40 TABLET, FILM COATED ORAL at 19:53

## 2023-07-15 ASSESSMENT — PAIN SCALES - GENERAL
PAINLEVEL_OUTOF10: 0

## 2023-07-16 LAB
ANION GAP SERPL CALCULATED.3IONS-SCNC: 12 MMOL/L (ref 3–16)
APTT BLD: 113.5 SEC (ref 22.7–35.9)
APTT BLD: 68.2 SEC (ref 22.7–35.9)
APTT BLD: 76.1 SEC (ref 22.7–35.9)
BUN SERPL-MCNC: 57 MG/DL (ref 7–20)
CALCIUM SERPL-MCNC: 9.1 MG/DL (ref 8.3–10.6)
CHLORIDE SERPL-SCNC: 99 MMOL/L (ref 99–110)
CO2 SERPL-SCNC: 27 MMOL/L (ref 21–32)
CREAT SERPL-MCNC: 2 MG/DL (ref 0.8–1.3)
DEPRECATED RDW RBC AUTO: 14.8 % (ref 12.4–15.4)
GFR SERPLBLD CREATININE-BSD FMLA CKD-EPI: 33 ML/MIN/{1.73_M2}
GLUCOSE SERPL-MCNC: 102 MG/DL (ref 70–99)
HCT VFR BLD AUTO: 33.7 % (ref 40.5–52.5)
HGB BLD-MCNC: 11.3 G/DL (ref 13.5–17.5)
MAGNESIUM SERPL-MCNC: 2.1 MG/DL (ref 1.8–2.4)
MCH RBC QN AUTO: 31 PG (ref 26–34)
MCHC RBC AUTO-ENTMCNC: 33.6 G/DL (ref 31–36)
MCV RBC AUTO: 92.3 FL (ref 80–100)
NT-PROBNP SERPL-MCNC: 6613 PG/ML (ref 0–449)
PLATELET # BLD AUTO: 190 K/UL (ref 135–450)
PMV BLD AUTO: 8.9 FL (ref 5–10.5)
POTASSIUM SERPL-SCNC: 4.5 MMOL/L (ref 3.5–5.1)
RBC # BLD AUTO: 3.65 M/UL (ref 4.2–5.9)
SODIUM SERPL-SCNC: 138 MMOL/L (ref 136–145)
WBC # BLD AUTO: 7.8 K/UL (ref 4–11)

## 2023-07-16 PROCEDURE — 36415 COLL VENOUS BLD VENIPUNCTURE: CPT

## 2023-07-16 PROCEDURE — 94640 AIRWAY INHALATION TREATMENT: CPT

## 2023-07-16 PROCEDURE — 99233 SBSQ HOSP IP/OBS HIGH 50: CPT | Performed by: INTERNAL MEDICINE

## 2023-07-16 PROCEDURE — 99232 SBSQ HOSP IP/OBS MODERATE 35: CPT | Performed by: NURSE PRACTITIONER

## 2023-07-16 PROCEDURE — 85730 THROMBOPLASTIN TIME PARTIAL: CPT

## 2023-07-16 PROCEDURE — 6370000000 HC RX 637 (ALT 250 FOR IP): Performed by: NURSE PRACTITIONER

## 2023-07-16 PROCEDURE — 6370000000 HC RX 637 (ALT 250 FOR IP): Performed by: SURGERY

## 2023-07-16 PROCEDURE — 80048 BASIC METABOLIC PNL TOTAL CA: CPT

## 2023-07-16 PROCEDURE — 85027 COMPLETE CBC AUTOMATED: CPT

## 2023-07-16 PROCEDURE — 83735 ASSAY OF MAGNESIUM: CPT

## 2023-07-16 PROCEDURE — 6370000000 HC RX 637 (ALT 250 FOR IP): Performed by: INTERNAL MEDICINE

## 2023-07-16 PROCEDURE — 83880 ASSAY OF NATRIURETIC PEPTIDE: CPT

## 2023-07-16 PROCEDURE — 1200000000 HC SEMI PRIVATE

## 2023-07-16 PROCEDURE — 6360000002 HC RX W HCPCS: Performed by: INTERNAL MEDICINE

## 2023-07-16 RX ORDER — IPRATROPIUM BROMIDE AND ALBUTEROL SULFATE 2.5; .5 MG/3ML; MG/3ML
1 SOLUTION RESPIRATORY (INHALATION) EVERY 4 HOURS PRN
Status: DISCONTINUED | OUTPATIENT
Start: 2023-07-16 | End: 2023-07-23 | Stop reason: HOSPADM

## 2023-07-16 RX ADMIN — ASPIRIN 81 MG 81 MG: 81 TABLET ORAL at 08:30

## 2023-07-16 RX ADMIN — PREDNISONE 40 MG: 20 TABLET ORAL at 08:30

## 2023-07-16 RX ADMIN — METOPROLOL SUCCINATE 25 MG: 25 TABLET, EXTENDED RELEASE ORAL at 08:30

## 2023-07-16 RX ADMIN — HEPARIN SODIUM 18 UNITS/KG/HR: 10000 INJECTION, SOLUTION INTRAVENOUS at 00:25

## 2023-07-16 RX ADMIN — ATORVASTATIN CALCIUM 40 MG: 40 TABLET, FILM COATED ORAL at 19:37

## 2023-07-16 RX ADMIN — HEPARIN SODIUM 18 UNITS/KG/HR: 10000 INJECTION, SOLUTION INTRAVENOUS at 23:18

## 2023-07-16 RX ADMIN — IPRATROPIUM BROMIDE AND ALBUTEROL SULFATE 1 DOSE: 2.5; .5 SOLUTION RESPIRATORY (INHALATION) at 13:23

## 2023-07-16 ASSESSMENT — PAIN SCALES - GENERAL
PAINLEVEL_OUTOF10: 0

## 2023-07-17 LAB
ALBUMIN SERPL-MCNC: 3.2 G/DL (ref 3.4–5)
ANION GAP SERPL CALCULATED.3IONS-SCNC: 6 MMOL/L (ref 3–16)
ANTI-XA UNFRAC HEPARIN: 0.71 IU/ML (ref 0.3–0.7)
APTT BLD: 104 SEC (ref 22.7–35.9)
APTT BLD: 104.2 SEC (ref 22.7–35.9)
APTT BLD: 77.6 SEC (ref 22.7–35.9)
BUN SERPL-MCNC: 77 MG/DL (ref 7–20)
CALCIUM SERPL-MCNC: 9.1 MG/DL (ref 8.3–10.6)
CHLORIDE SERPL-SCNC: 100 MMOL/L (ref 99–110)
CO2 SERPL-SCNC: 31 MMOL/L (ref 21–32)
CREAT SERPL-MCNC: 1.8 MG/DL (ref 0.8–1.3)
GFR SERPLBLD CREATININE-BSD FMLA CKD-EPI: 37 ML/MIN/{1.73_M2}
GLUCOSE SERPL-MCNC: 96 MG/DL (ref 70–99)
PHOSPHATE SERPL-MCNC: 2.5 MG/DL (ref 2.5–4.9)
POTASSIUM SERPL-SCNC: 4.2 MMOL/L (ref 3.5–5.1)
SODIUM SERPL-SCNC: 137 MMOL/L (ref 136–145)

## 2023-07-17 PROCEDURE — 6370000000 HC RX 637 (ALT 250 FOR IP): Performed by: NURSE PRACTITIONER

## 2023-07-17 PROCEDURE — 6370000000 HC RX 637 (ALT 250 FOR IP): Performed by: SURGERY

## 2023-07-17 PROCEDURE — 99233 SBSQ HOSP IP/OBS HIGH 50: CPT | Performed by: INTERNAL MEDICINE

## 2023-07-17 PROCEDURE — 36415 COLL VENOUS BLD VENIPUNCTURE: CPT

## 2023-07-17 PROCEDURE — 1200000000 HC SEMI PRIVATE

## 2023-07-17 PROCEDURE — 6360000002 HC RX W HCPCS: Performed by: INTERNAL MEDICINE

## 2023-07-17 PROCEDURE — 6370000000 HC RX 637 (ALT 250 FOR IP): Performed by: INTERNAL MEDICINE

## 2023-07-17 PROCEDURE — 99232 SBSQ HOSP IP/OBS MODERATE 35: CPT | Performed by: NURSE PRACTITIONER

## 2023-07-17 PROCEDURE — 80069 RENAL FUNCTION PANEL: CPT

## 2023-07-17 PROCEDURE — 94640 AIRWAY INHALATION TREATMENT: CPT

## 2023-07-17 PROCEDURE — 85520 HEPARIN ASSAY: CPT

## 2023-07-17 PROCEDURE — 85730 THROMBOPLASTIN TIME PARTIAL: CPT

## 2023-07-17 RX ORDER — IPRATROPIUM BROMIDE AND ALBUTEROL SULFATE 2.5; .5 MG/3ML; MG/3ML
1 SOLUTION RESPIRATORY (INHALATION) 3 TIMES DAILY
Status: DISCONTINUED | OUTPATIENT
Start: 2023-07-17 | End: 2023-07-19

## 2023-07-17 RX ADMIN — METOPROLOL SUCCINATE 25 MG: 25 TABLET, EXTENDED RELEASE ORAL at 08:18

## 2023-07-17 RX ADMIN — ATORVASTATIN CALCIUM 40 MG: 40 TABLET, FILM COATED ORAL at 21:29

## 2023-07-17 RX ADMIN — PREDNISONE 40 MG: 20 TABLET ORAL at 08:18

## 2023-07-17 RX ADMIN — ASPIRIN 81 MG 81 MG: 81 TABLET ORAL at 08:18

## 2023-07-17 RX ADMIN — IPRATROPIUM BROMIDE AND ALBUTEROL SULFATE 1 DOSE: 2.5; .5 SOLUTION RESPIRATORY (INHALATION) at 20:20

## 2023-07-17 RX ADMIN — HEPARIN SODIUM 15 UNITS/KG/HR: 10000 INJECTION, SOLUTION INTRAVENOUS at 22:37

## 2023-07-17 ASSESSMENT — PAIN SCALES - GENERAL: PAINLEVEL_OUTOF10: 0

## 2023-07-18 ENCOUNTER — APPOINTMENT (OUTPATIENT)
Dept: CARDIAC CATH/INVASIVE PROCEDURES | Age: 83
DRG: 280 | End: 2023-07-18
Attending: INTERNAL MEDICINE
Payer: MEDICARE

## 2023-07-18 LAB
ANION GAP SERPL CALCULATED.3IONS-SCNC: 11 MMOL/L (ref 3–16)
ANTI-XA UNFRAC HEPARIN: 0.53 IU/ML (ref 0.3–0.7)
ANTI-XA UNFRAC HEPARIN: 0.62 IU/ML (ref 0.3–0.7)
BASOPHILS # BLD: 0.1 K/UL (ref 0–0.2)
BASOPHILS NFR BLD: 0.9 %
BUN SERPL-MCNC: 95 MG/DL (ref 7–20)
CALCIUM SERPL-MCNC: 9.1 MG/DL (ref 8.3–10.6)
CHLORIDE SERPL-SCNC: 100 MMOL/L (ref 99–110)
CO2 SERPL-SCNC: 26 MMOL/L (ref 21–32)
CREAT SERPL-MCNC: 2 MG/DL (ref 0.8–1.3)
DEPRECATED RDW RBC AUTO: 15.4 % (ref 12.4–15.4)
DEPRECATED RDW RBC AUTO: 16.1 % (ref 12.4–15.4)
EOSINOPHIL # BLD: 0 K/UL (ref 0–0.6)
EOSINOPHIL NFR BLD: 0.2 %
GFR SERPLBLD CREATININE-BSD FMLA CKD-EPI: 33 ML/MIN/{1.73_M2}
GLUCOSE SERPL-MCNC: 87 MG/DL (ref 70–99)
HCT VFR BLD AUTO: 20.8 % (ref 40.5–52.5)
HCT VFR BLD AUTO: 21.4 % (ref 40.5–52.5)
HCT VFR BLD AUTO: 23.2 % (ref 40.5–52.5)
HCT VFR BLD AUTO: 24.3 % (ref 40.5–52.5)
HGB BLD-MCNC: 7 G/DL (ref 13.5–17.5)
HGB BLD-MCNC: 7.1 G/DL (ref 13.5–17.5)
HGB BLD-MCNC: 7.5 G/DL (ref 13.5–17.5)
HGB BLD-MCNC: 7.9 G/DL (ref 13.5–17.5)
LYMPHOCYTES # BLD: 2.2 K/UL (ref 1–5.1)
LYMPHOCYTES NFR BLD: 19.1 %
MAGNESIUM SERPL-MCNC: 2.2 MG/DL (ref 1.8–2.4)
MCH RBC QN AUTO: 30 PG (ref 26–34)
MCH RBC QN AUTO: 30.9 PG (ref 26–34)
MCHC RBC AUTO-ENTMCNC: 32.3 G/DL (ref 31–36)
MCHC RBC AUTO-ENTMCNC: 32.3 G/DL (ref 31–36)
MCV RBC AUTO: 93 FL (ref 80–100)
MCV RBC AUTO: 95.6 FL (ref 80–100)
MONOCYTES # BLD: 1.1 K/UL (ref 0–1.3)
MONOCYTES NFR BLD: 9.8 %
NEUTROPHILS # BLD: 8.1 K/UL (ref 1.7–7.7)
NEUTROPHILS NFR BLD: 70 %
PLATELET # BLD AUTO: 187 K/UL (ref 135–450)
PLATELET # BLD AUTO: 202 K/UL (ref 135–450)
PMV BLD AUTO: 10.1 FL (ref 5–10.5)
PMV BLD AUTO: 9.2 FL (ref 5–10.5)
POTASSIUM SERPL-SCNC: 4.1 MMOL/L (ref 3.5–5.1)
RBC # BLD AUTO: 2.43 M/UL (ref 4.2–5.9)
RBC # BLD AUTO: 2.62 M/UL (ref 4.2–5.9)
SODIUM SERPL-SCNC: 137 MMOL/L (ref 136–145)
WBC # BLD AUTO: 11.6 K/UL (ref 4–11)
WBC # BLD AUTO: 12.3 K/UL (ref 4–11)

## 2023-07-18 PROCEDURE — 6370000000 HC RX 637 (ALT 250 FOR IP): Performed by: INTERNAL MEDICINE

## 2023-07-18 PROCEDURE — 85520 HEPARIN ASSAY: CPT

## 2023-07-18 PROCEDURE — 99233 SBSQ HOSP IP/OBS HIGH 50: CPT | Performed by: NURSE PRACTITIONER

## 2023-07-18 PROCEDURE — 86923 COMPATIBILITY TEST ELECTRIC: CPT

## 2023-07-18 PROCEDURE — 83735 ASSAY OF MAGNESIUM: CPT

## 2023-07-18 PROCEDURE — 85018 HEMOGLOBIN: CPT

## 2023-07-18 PROCEDURE — 99233 SBSQ HOSP IP/OBS HIGH 50: CPT | Performed by: INTERNAL MEDICINE

## 2023-07-18 PROCEDURE — 86901 BLOOD TYPING SEROLOGIC RH(D): CPT

## 2023-07-18 PROCEDURE — 86850 RBC ANTIBODY SCREEN: CPT

## 2023-07-18 PROCEDURE — C9113 INJ PANTOPRAZOLE SODIUM, VIA: HCPCS | Performed by: INTERNAL MEDICINE

## 2023-07-18 PROCEDURE — 85025 COMPLETE CBC W/AUTO DIFF WBC: CPT

## 2023-07-18 PROCEDURE — 94640 AIRWAY INHALATION TREATMENT: CPT

## 2023-07-18 PROCEDURE — 85014 HEMATOCRIT: CPT

## 2023-07-18 PROCEDURE — P9016 RBC LEUKOCYTES REDUCED: HCPCS

## 2023-07-18 PROCEDURE — 1200000000 HC SEMI PRIVATE

## 2023-07-18 PROCEDURE — 2580000003 HC RX 258: Performed by: INTERNAL MEDICINE

## 2023-07-18 PROCEDURE — 36415 COLL VENOUS BLD VENIPUNCTURE: CPT

## 2023-07-18 PROCEDURE — 86900 BLOOD TYPING SEROLOGIC ABO: CPT

## 2023-07-18 PROCEDURE — 6360000002 HC RX W HCPCS: Performed by: INTERNAL MEDICINE

## 2023-07-18 PROCEDURE — 80048 BASIC METABOLIC PNL TOTAL CA: CPT

## 2023-07-18 PROCEDURE — 85027 COMPLETE CBC AUTOMATED: CPT

## 2023-07-18 RX ORDER — SODIUM CHLORIDE 9 MG/ML
INJECTION, SOLUTION INTRAVENOUS PRN
Status: DISCONTINUED | OUTPATIENT
Start: 2023-07-18 | End: 2023-07-23 | Stop reason: HOSPADM

## 2023-07-18 RX ORDER — PANTOPRAZOLE SODIUM 40 MG/10ML
80 INJECTION, POWDER, LYOPHILIZED, FOR SOLUTION INTRAVENOUS ONCE
Status: COMPLETED | OUTPATIENT
Start: 2023-07-18 | End: 2023-07-18

## 2023-07-18 RX ADMIN — IPRATROPIUM BROMIDE AND ALBUTEROL SULFATE 1 DOSE: 2.5; .5 SOLUTION RESPIRATORY (INHALATION) at 08:25

## 2023-07-18 RX ADMIN — PANTOPRAZOLE SODIUM 8 MG/HR: 40 INJECTION, POWDER, FOR SOLUTION INTRAVENOUS at 20:19

## 2023-07-18 RX ADMIN — METOPROLOL SUCCINATE 25 MG: 25 TABLET, EXTENDED RELEASE ORAL at 09:12

## 2023-07-18 RX ADMIN — PANTOPRAZOLE SODIUM 8 MG/HR: 40 INJECTION, POWDER, FOR SOLUTION INTRAVENOUS at 10:24

## 2023-07-18 RX ADMIN — ATORVASTATIN CALCIUM 40 MG: 40 TABLET, FILM COATED ORAL at 21:41

## 2023-07-18 RX ADMIN — PANTOPRAZOLE SODIUM 80 MG: 40 INJECTION, POWDER, FOR SOLUTION INTRAVENOUS at 10:16

## 2023-07-18 RX ADMIN — IPRATROPIUM BROMIDE AND ALBUTEROL SULFATE 1 DOSE: 2.5; .5 SOLUTION RESPIRATORY (INHALATION) at 14:28

## 2023-07-18 RX ADMIN — ASPIRIN 81 MG 81 MG: 81 TABLET ORAL at 09:12

## 2023-07-19 ENCOUNTER — ANESTHESIA (OUTPATIENT)
Dept: ENDOSCOPY | Age: 83
End: 2023-07-19
Payer: MEDICARE

## 2023-07-19 ENCOUNTER — ANESTHESIA EVENT (OUTPATIENT)
Dept: ENDOSCOPY | Age: 83
End: 2023-07-19
Payer: MEDICARE

## 2023-07-19 LAB
ABO + RH BLD: NORMAL
ANION GAP SERPL CALCULATED.3IONS-SCNC: 7 MMOL/L (ref 3–16)
BLD GP AB SCN SERPL QL: NORMAL
BLOOD BANK DISPENSE STATUS: NORMAL
BLOOD BANK PRODUCT CODE: NORMAL
BPU ID: NORMAL
BUN SERPL-MCNC: 81 MG/DL (ref 7–20)
CALCIUM SERPL-MCNC: 8.6 MG/DL (ref 8.3–10.6)
CHLORIDE SERPL-SCNC: 105 MMOL/L (ref 99–110)
CO2 SERPL-SCNC: 27 MMOL/L (ref 21–32)
CREAT SERPL-MCNC: 1.9 MG/DL (ref 0.8–1.3)
DESCRIPTION BLOOD BANK: NORMAL
GFR SERPLBLD CREATININE-BSD FMLA CKD-EPI: 35 ML/MIN/{1.73_M2}
GLUCOSE SERPL-MCNC: 96 MG/DL (ref 70–99)
HCT VFR BLD AUTO: 19.6 % (ref 40.5–52.5)
HCT VFR BLD AUTO: 22.2 % (ref 40.5–52.5)
HGB BLD-MCNC: 6.7 G/DL (ref 13.5–17.5)
HGB BLD-MCNC: 7.5 G/DL (ref 13.5–17.5)
MAGNESIUM SERPL-MCNC: 2.2 MG/DL (ref 1.8–2.4)
NT-PROBNP SERPL-MCNC: 1979 PG/ML (ref 0–449)
POTASSIUM SERPL-SCNC: 4.4 MMOL/L (ref 3.5–5.1)
SODIUM SERPL-SCNC: 139 MMOL/L (ref 136–145)

## 2023-07-19 PROCEDURE — 80048 BASIC METABOLIC PNL TOTAL CA: CPT

## 2023-07-19 PROCEDURE — 0DB68ZX EXCISION OF STOMACH, VIA NATURAL OR ARTIFICIAL OPENING ENDOSCOPIC, DIAGNOSTIC: ICD-10-PCS | Performed by: INTERNAL MEDICINE

## 2023-07-19 PROCEDURE — 51798 US URINE CAPACITY MEASURE: CPT

## 2023-07-19 PROCEDURE — 99233 SBSQ HOSP IP/OBS HIGH 50: CPT | Performed by: INTERNAL MEDICINE

## 2023-07-19 PROCEDURE — 1200000000 HC SEMI PRIVATE

## 2023-07-19 PROCEDURE — 7100000010 HC PHASE II RECOVERY - FIRST 15 MIN: Performed by: INTERNAL MEDICINE

## 2023-07-19 PROCEDURE — 6360000002 HC RX W HCPCS: Performed by: INTERNAL MEDICINE

## 2023-07-19 PROCEDURE — 2500000003 HC RX 250 WO HCPCS: Performed by: NURSE ANESTHETIST, CERTIFIED REGISTERED

## 2023-07-19 PROCEDURE — 85018 HEMOGLOBIN: CPT

## 2023-07-19 PROCEDURE — 2580000003 HC RX 258: Performed by: INTERNAL MEDICINE

## 2023-07-19 PROCEDURE — 6370000000 HC RX 637 (ALT 250 FOR IP): Performed by: INTERNAL MEDICINE

## 2023-07-19 PROCEDURE — 94640 AIRWAY INHALATION TREATMENT: CPT

## 2023-07-19 PROCEDURE — 6370000000 HC RX 637 (ALT 250 FOR IP): Performed by: UROLOGY

## 2023-07-19 PROCEDURE — 6360000002 HC RX W HCPCS: Performed by: NURSE ANESTHETIST, CERTIFIED REGISTERED

## 2023-07-19 PROCEDURE — 30233N1 TRANSFUSION OF NONAUTOLOGOUS RED BLOOD CELLS INTO PERIPHERAL VEIN, PERCUTANEOUS APPROACH: ICD-10-PCS | Performed by: INTERNAL MEDICINE

## 2023-07-19 PROCEDURE — C9113 INJ PANTOPRAZOLE SODIUM, VIA: HCPCS | Performed by: INTERNAL MEDICINE

## 2023-07-19 PROCEDURE — 85014 HEMATOCRIT: CPT

## 2023-07-19 PROCEDURE — 83880 ASSAY OF NATRIURETIC PEPTIDE: CPT

## 2023-07-19 PROCEDURE — 3700000000 HC ANESTHESIA ATTENDED CARE: Performed by: INTERNAL MEDICINE

## 2023-07-19 PROCEDURE — 2709999900 HC NON-CHARGEABLE SUPPLY: Performed by: INTERNAL MEDICINE

## 2023-07-19 PROCEDURE — 2580000003 HC RX 258: Performed by: NURSE ANESTHETIST, CERTIFIED REGISTERED

## 2023-07-19 PROCEDURE — 94761 N-INVAS EAR/PLS OXIMETRY MLT: CPT

## 2023-07-19 PROCEDURE — 36415 COLL VENOUS BLD VENIPUNCTURE: CPT

## 2023-07-19 PROCEDURE — 83735 ASSAY OF MAGNESIUM: CPT

## 2023-07-19 PROCEDURE — 3609012400 HC EGD TRANSORAL BIOPSY SINGLE/MULTIPLE: Performed by: INTERNAL MEDICINE

## 2023-07-19 PROCEDURE — 3700000001 HC ADD 15 MINUTES (ANESTHESIA): Performed by: INTERNAL MEDICINE

## 2023-07-19 PROCEDURE — 7100000011 HC PHASE II RECOVERY - ADDTL 15 MIN: Performed by: INTERNAL MEDICINE

## 2023-07-19 PROCEDURE — 88342 IMHCHEM/IMCYTCHM 1ST ANTB: CPT

## 2023-07-19 PROCEDURE — 99232 SBSQ HOSP IP/OBS MODERATE 35: CPT | Performed by: NURSE PRACTITIONER

## 2023-07-19 PROCEDURE — 36430 TRANSFUSION BLD/BLD COMPNT: CPT

## 2023-07-19 PROCEDURE — 88305 TISSUE EXAM BY PATHOLOGIST: CPT

## 2023-07-19 RX ORDER — SODIUM CHLORIDE 9 MG/ML
INJECTION, SOLUTION INTRAVENOUS CONTINUOUS PRN
Status: DISCONTINUED | OUTPATIENT
Start: 2023-07-19 | End: 2023-07-19 | Stop reason: SDUPTHER

## 2023-07-19 RX ORDER — LIDOCAINE HYDROCHLORIDE 20 MG/ML
INJECTION, SOLUTION INTRAVENOUS PRN
Status: DISCONTINUED | OUTPATIENT
Start: 2023-07-19 | End: 2023-07-19 | Stop reason: SDUPTHER

## 2023-07-19 RX ORDER — PANTOPRAZOLE SODIUM 40 MG/1
40 TABLET, DELAYED RELEASE ORAL
Status: DISCONTINUED | OUTPATIENT
Start: 2023-07-19 | End: 2023-07-23 | Stop reason: HOSPADM

## 2023-07-19 RX ORDER — TAMSULOSIN HYDROCHLORIDE 0.4 MG/1
0.4 CAPSULE ORAL NIGHTLY
Status: DISCONTINUED | OUTPATIENT
Start: 2023-07-19 | End: 2023-07-23 | Stop reason: HOSPADM

## 2023-07-19 RX ORDER — PROPOFOL 10 MG/ML
INJECTION, EMULSION INTRAVENOUS PRN
Status: DISCONTINUED | OUTPATIENT
Start: 2023-07-19 | End: 2023-07-19 | Stop reason: SDUPTHER

## 2023-07-19 RX ORDER — SODIUM CHLORIDE, SODIUM LACTATE, POTASSIUM CHLORIDE, CALCIUM CHLORIDE 600; 310; 30; 20 MG/100ML; MG/100ML; MG/100ML; MG/100ML
INJECTION, SOLUTION INTRAVENOUS ONCE
Status: COMPLETED | OUTPATIENT
Start: 2023-07-19 | End: 2023-07-19

## 2023-07-19 RX ORDER — GLYCOPYRROLATE 0.2 MG/ML
INJECTION INTRAMUSCULAR; INTRAVENOUS PRN
Status: DISCONTINUED | OUTPATIENT
Start: 2023-07-19 | End: 2023-07-19 | Stop reason: SDUPTHER

## 2023-07-19 RX ADMIN — TAMSULOSIN HYDROCHLORIDE 0.4 MG: 0.4 CAPSULE ORAL at 22:17

## 2023-07-19 RX ADMIN — PANTOPRAZOLE SODIUM 40 MG: 40 TABLET, DELAYED RELEASE ORAL at 15:55

## 2023-07-19 RX ADMIN — PHENYLEPHRINE HYDROCHLORIDE 100 MCG: 10 INJECTION, SOLUTION INTRAMUSCULAR; INTRAVENOUS; SUBCUTANEOUS at 13:05

## 2023-07-19 RX ADMIN — ATORVASTATIN CALCIUM 40 MG: 40 TABLET, FILM COATED ORAL at 20:37

## 2023-07-19 RX ADMIN — PANTOPRAZOLE SODIUM 8 MG/HR: 40 INJECTION, POWDER, FOR SOLUTION INTRAVENOUS at 05:16

## 2023-07-19 RX ADMIN — IPRATROPIUM BROMIDE AND ALBUTEROL SULFATE 1 DOSE: 2.5; .5 SOLUTION RESPIRATORY (INHALATION) at 08:48

## 2023-07-19 RX ADMIN — LIDOCAINE HYDROCHLORIDE 50 MG: 20 INJECTION, SOLUTION INTRAVENOUS at 12:47

## 2023-07-19 RX ADMIN — SODIUM CHLORIDE: 9 INJECTION, SOLUTION INTRAVENOUS at 12:44

## 2023-07-19 RX ADMIN — SODIUM CHLORIDE, PRESERVATIVE FREE 10 ML: 5 INJECTION INTRAVENOUS at 20:38

## 2023-07-19 RX ADMIN — PROPOFOL 150 MCG/KG/MIN: 10 INJECTION, EMULSION INTRAVENOUS at 12:46

## 2023-07-19 RX ADMIN — PROPOFOL 50 MG: 10 INJECTION, EMULSION INTRAVENOUS at 12:47

## 2023-07-19 RX ADMIN — GLYCOPYRROLATE 0.1 MG: 0.2 INJECTION INTRAMUSCULAR; INTRAVENOUS at 13:05

## 2023-07-19 RX ADMIN — PROPOFOL 30 MG: 10 INJECTION, EMULSION INTRAVENOUS at 12:50

## 2023-07-19 RX ADMIN — SODIUM CHLORIDE, POTASSIUM CHLORIDE, SODIUM LACTATE AND CALCIUM CHLORIDE: 600; 310; 30; 20 INJECTION, SOLUTION INTRAVENOUS at 12:39

## 2023-07-19 RX ADMIN — METOPROLOL SUCCINATE 25 MG: 25 TABLET, EXTENDED RELEASE ORAL at 08:33

## 2023-07-19 ASSESSMENT — PAIN SCALES - WONG BAKER: WONGBAKER_NUMERICALRESPONSE: 0

## 2023-07-19 ASSESSMENT — LIFESTYLE VARIABLES: SMOKING_STATUS: 1

## 2023-07-19 ASSESSMENT — PAIN - FUNCTIONAL ASSESSMENT: PAIN_FUNCTIONAL_ASSESSMENT: NONE - DENIES PAIN

## 2023-07-19 NOTE — CONSENT
Informed Consent for Blood Component Transfusion Note    I have discussed with the patient the rationale for blood component transfusion; its benefits in treating or preventing fatigue, organ damage, or death; and its risk which includes mild transfusion reactions, rare risk of blood borne infection, or more serious but rare reactions. I have discussed the alternatives to transfusion, including the risk and consequences of not receiving transfusion. The patient had an opportunity to ask questions and had agreed to proceed with transfusion of blood components.     Electronically signed by Rj Saleh MD on 7/19/23 at 6:36 AM EDT

## 2023-07-19 NOTE — PROCEDURES
[facility]  Endoscopy Note    Patient: Aletha Duncan  : 1940  Acct#:     Procedure: Esophagogastroduodenoscopy with biopsy    Date:  2023     Surgeon:  Yolanda Singh MD      Anesthesia:    IV propofol, per anesthesia       EBL: <50 mL    Indications:  melena     Description of Procedure:    Informed consent was obtained from the patient after explanation of indications, benefits and possible risks and complications of the procedure. The patient was then taken to the endoscopy suite, placed in the left lateral decubitus position and the above IV sedation was administrered. The Olympus videoendoscope ([id number]-[id number]) was placed in the patient's mouth and under direct visualization passed into the esophagus. The scope was then advanced into the stomach and to the second portion of the duodenum. A retroflexed exam of the gastric cardia and fundus was performed. The scope was then withdrawn back into the stomach, it was decompressed, and the scope was completely withdrawn. Findings:  Normal second portion of the duodenum. Multiple clean-based cratered ulcers in the first portion of the duodenum, the largest measuring 10 mm. Few erosions gastric antrum and body. Biopsies were obtained evaluate for H. pylori. Normal esophagus. The patient tolerated the procedure well and was taken to the post anesthesia care unit in good condition. Biopsies: Yes. Impression:    Normal second portion of the duodenum. Multiple clean-based cratered ulcers in the first portion of the duodenum, the largest measuring 10 mm. Few erosions gastric antrum and body. Biopsies were obtained evaluate for H. pylori. Normal esophagus. Recommendations:   Await pathology results. PPI twice daily.        Yolanda Singh MD  [facility]0 [address]

## 2023-07-19 NOTE — ANESTHESIA POSTPROCEDURE EVALUATION
Department of Anesthesiology  Postprocedure Note    Patient: Lindy Mendoza  MRN: [id number]  YOB: 1940  Date of evaluation: 7/19/2023      Procedure Summary     Date: 07/19/23 Room / Location: Vidya WORTHY  / [facility] [address] [facility]    Anesthesia Start: 1244 Anesthesia Stop: 3854    Procedure: EGD BIOPSY Diagnosis:       Acute upper GI bleed      (Acute upper GI bleed [K92.2])    Surgeons: Zachery Dixon MD Responsible Provider: Luis Faulkner MD    Anesthesia Type: Not recorded ASA Status: 3          Anesthesia Type: No value filed.     Sandi Phase I: Sandi Score: 10    Sandi Phase II: Sandi Score: 10      Anesthesia Post Evaluation    Patient location during evaluation: PACU  Level of consciousness: awake  Complications: no  Multimodal analgesia pain management approach

## 2023-07-20 ENCOUNTER — TELEPHONE (OUTPATIENT)
Dept: CARDIOLOGY CLINIC | Age: 83
End: 2023-07-20

## 2023-07-20 LAB
ANION GAP SERPL CALCULATED.3IONS-SCNC: 9 MMOL/L (ref 3–16)
BUN SERPL-MCNC: 54 MG/DL (ref 7–20)
CALCIUM SERPL-MCNC: 8.7 MG/DL (ref 8.3–10.6)
CHLORIDE SERPL-SCNC: 106 MMOL/L (ref 99–110)
CO2 SERPL-SCNC: 27 MMOL/L (ref 21–32)
CREAT SERPL-MCNC: 1.7 MG/DL (ref 0.8–1.3)
GFR SERPLBLD CREATININE-BSD FMLA CKD-EPI: 40 ML/MIN/{1.73_M2}
GLUCOSE SERPL-MCNC: 95 MG/DL (ref 70–99)
MAGNESIUM SERPL-MCNC: 2.3 MG/DL (ref 1.8–2.4)
POTASSIUM SERPL-SCNC: 4.4 MMOL/L (ref 3.5–5.1)
PSA SERPL DL<=0.01 NG/ML-MCNC: 2.19 NG/ML (ref 0–4)
SODIUM SERPL-SCNC: 142 MMOL/L (ref 136–145)

## 2023-07-20 PROCEDURE — 6370000000 HC RX 637 (ALT 250 FOR IP): Performed by: INTERNAL MEDICINE

## 2023-07-20 PROCEDURE — 84153 ASSAY OF PSA TOTAL: CPT

## 2023-07-20 PROCEDURE — 99232 SBSQ HOSP IP/OBS MODERATE 35: CPT | Performed by: NURSE PRACTITIONER

## 2023-07-20 PROCEDURE — 99233 SBSQ HOSP IP/OBS HIGH 50: CPT | Performed by: INTERNAL MEDICINE

## 2023-07-20 PROCEDURE — 2580000003 HC RX 258: Performed by: INTERNAL MEDICINE

## 2023-07-20 PROCEDURE — 6370000000 HC RX 637 (ALT 250 FOR IP): Performed by: UROLOGY

## 2023-07-20 PROCEDURE — 36415 COLL VENOUS BLD VENIPUNCTURE: CPT

## 2023-07-20 PROCEDURE — 6370000000 HC RX 637 (ALT 250 FOR IP)

## 2023-07-20 PROCEDURE — 80048 BASIC METABOLIC PNL TOTAL CA: CPT

## 2023-07-20 PROCEDURE — 1200000000 HC SEMI PRIVATE

## 2023-07-20 PROCEDURE — 83735 ASSAY OF MAGNESIUM: CPT

## 2023-07-20 RX ORDER — FUROSEMIDE 20 MG/1
20 TABLET ORAL DAILY
Status: DISCONTINUED | OUTPATIENT
Start: 2023-07-20 | End: 2023-07-23 | Stop reason: HOSPADM

## 2023-07-20 RX ADMIN — SODIUM CHLORIDE, PRESERVATIVE FREE 10 ML: 5 INJECTION INTRAVENOUS at 10:17

## 2023-07-20 RX ADMIN — PANTOPRAZOLE SODIUM 40 MG: 40 TABLET, DELAYED RELEASE ORAL at 06:39

## 2023-07-20 RX ADMIN — ATORVASTATIN CALCIUM 40 MG: 40 TABLET, FILM COATED ORAL at 21:41

## 2023-07-20 RX ADMIN — METOPROLOL SUCCINATE 25 MG: 25 TABLET, EXTENDED RELEASE ORAL at 10:17

## 2023-07-20 RX ADMIN — ASPIRIN 81 MG 81 MG: 81 TABLET ORAL at 10:17

## 2023-07-20 RX ADMIN — FUROSEMIDE 20 MG: 20 TABLET ORAL at 13:05

## 2023-07-20 RX ADMIN — PANTOPRAZOLE SODIUM 40 MG: 40 TABLET, DELAYED RELEASE ORAL at 15:36

## 2023-07-20 RX ADMIN — SODIUM CHLORIDE, PRESERVATIVE FREE 10 ML: 5 INJECTION INTRAVENOUS at 21:41

## 2023-07-20 RX ADMIN — TAMSULOSIN HYDROCHLORIDE 0.4 MG: 0.4 CAPSULE ORAL at 21:41

## 2023-07-20 ASSESSMENT — PAIN SCALES - GENERAL
PAINLEVEL_OUTOF10: 0

## 2023-07-20 NOTE — TELEPHONE ENCOUNTER
The patient's niece Rasheeda Coyle called requesting to speak with Nisha Rust NP. Rasheeda Coyle states she is concerned about the patient being discharged from the hospital to soon. Please call Rasheeda Coyle back at [phone] to advise.

## 2023-07-21 ENCOUNTER — NURSE ONLY (OUTPATIENT)
Dept: CARDIOLOGY | Age: 83
End: 2023-07-21

## 2023-07-21 PROBLEM — K92.2 ACUTE UPPER GI BLEED: Status: ACTIVE | Noted: 2023-07-21

## 2023-07-21 LAB
ANION GAP SERPL CALCULATED.3IONS-SCNC: 6 MMOL/L (ref 3–16)
BASOPHILS # BLD: 0 K/UL (ref 0–0.2)
BASOPHILS NFR BLD: 0.4 %
BUN SERPL-MCNC: 41 MG/DL (ref 7–20)
CALCIUM SERPL-MCNC: 8.4 MG/DL (ref 8.3–10.6)
CHLORIDE SERPL-SCNC: 101 MMOL/L (ref 99–110)
CO2 SERPL-SCNC: 27 MMOL/L (ref 21–32)
CREAT SERPL-MCNC: 1.7 MG/DL (ref 0.8–1.3)
DEPRECATED RDW RBC AUTO: 16.2 % (ref 12.4–15.4)
EOSINOPHIL # BLD: 0.1 K/UL (ref 0–0.6)
EOSINOPHIL NFR BLD: 1.5 %
FERRITIN SERPL IA-MCNC: 251.7 NG/ML (ref 30–400)
FOLATE SERPL-MCNC: 4.62 NG/ML (ref 4.78–24.2)
GFR SERPLBLD CREATININE-BSD FMLA CKD-EPI: 40 ML/MIN/{1.73_M2}
GLUCOSE SERPL-MCNC: 107 MG/DL (ref 70–99)
HCT VFR BLD AUTO: 24.2 % (ref 40.5–52.5)
HGB BLD-MCNC: 8 G/DL (ref 13.5–17.5)
LYMPHOCYTES # BLD: 1.4 K/UL (ref 1–5.1)
LYMPHOCYTES NFR BLD: 14.5 %
MCH RBC QN AUTO: 31.3 PG (ref 26–34)
MCHC RBC AUTO-ENTMCNC: 32.9 G/DL (ref 31–36)
MCV RBC AUTO: 95.1 FL (ref 80–100)
MONOCYTES # BLD: 1.1 K/UL (ref 0–1.3)
MONOCYTES NFR BLD: 11.7 %
NEUTROPHILS # BLD: 6.8 K/UL (ref 1.7–7.7)
NEUTROPHILS NFR BLD: 71.9 %
PLATELET # BLD AUTO: 182 K/UL (ref 135–450)
PMV BLD AUTO: 9 FL (ref 5–10.5)
POTASSIUM SERPL-SCNC: 4 MMOL/L (ref 3.5–5.1)
RBC # BLD AUTO: 2.54 M/UL (ref 4.2–5.9)
SODIUM SERPL-SCNC: 134 MMOL/L (ref 136–145)
VIT B12 SERPL-MCNC: 740 PG/ML (ref 211–911)
WBC # BLD AUTO: 9.4 K/UL (ref 4–11)

## 2023-07-21 PROCEDURE — 97530 THERAPEUTIC ACTIVITIES: CPT

## 2023-07-21 PROCEDURE — 6370000000 HC RX 637 (ALT 250 FOR IP)

## 2023-07-21 PROCEDURE — 6370000000 HC RX 637 (ALT 250 FOR IP): Performed by: INTERNAL MEDICINE

## 2023-07-21 PROCEDURE — 2580000003 HC RX 258: Performed by: INTERNAL MEDICINE

## 2023-07-21 PROCEDURE — 82728 ASSAY OF FERRITIN: CPT

## 2023-07-21 PROCEDURE — 83540 ASSAY OF IRON: CPT

## 2023-07-21 PROCEDURE — 1200000000 HC SEMI PRIVATE

## 2023-07-21 PROCEDURE — 36415 COLL VENOUS BLD VENIPUNCTURE: CPT

## 2023-07-21 PROCEDURE — 97535 SELF CARE MNGMENT TRAINING: CPT

## 2023-07-21 PROCEDURE — 85025 COMPLETE CBC W/AUTO DIFF WBC: CPT

## 2023-07-21 PROCEDURE — 99233 SBSQ HOSP IP/OBS HIGH 50: CPT | Performed by: INTERNAL MEDICINE

## 2023-07-21 PROCEDURE — 97166 OT EVAL MOD COMPLEX 45 MIN: CPT

## 2023-07-21 PROCEDURE — 99233 SBSQ HOSP IP/OBS HIGH 50: CPT | Performed by: NURSE PRACTITIONER

## 2023-07-21 PROCEDURE — 6370000000 HC RX 637 (ALT 250 FOR IP): Performed by: UROLOGY

## 2023-07-21 PROCEDURE — 82607 VITAMIN B-12: CPT

## 2023-07-21 PROCEDURE — 97162 PT EVAL MOD COMPLEX 30 MIN: CPT

## 2023-07-21 PROCEDURE — 97116 GAIT TRAINING THERAPY: CPT

## 2023-07-21 PROCEDURE — 82746 ASSAY OF FOLIC ACID SERUM: CPT

## 2023-07-21 PROCEDURE — 83550 IRON BINDING TEST: CPT

## 2023-07-21 PROCEDURE — 80048 BASIC METABOLIC PNL TOTAL CA: CPT

## 2023-07-21 RX ADMIN — PANTOPRAZOLE SODIUM 40 MG: 40 TABLET, DELAYED RELEASE ORAL at 06:26

## 2023-07-21 RX ADMIN — SODIUM CHLORIDE, PRESERVATIVE FREE 10 ML: 5 INJECTION INTRAVENOUS at 20:13

## 2023-07-21 RX ADMIN — TAMSULOSIN HYDROCHLORIDE 0.4 MG: 0.4 CAPSULE ORAL at 20:13

## 2023-07-21 RX ADMIN — FUROSEMIDE 20 MG: 20 TABLET ORAL at 08:50

## 2023-07-21 RX ADMIN — ATORVASTATIN CALCIUM 40 MG: 40 TABLET, FILM COATED ORAL at 20:13

## 2023-07-21 RX ADMIN — PANTOPRAZOLE SODIUM 40 MG: 40 TABLET, DELAYED RELEASE ORAL at 16:01

## 2023-07-21 RX ADMIN — ASPIRIN 81 MG 81 MG: 81 TABLET ORAL at 08:50

## 2023-07-21 RX ADMIN — SODIUM CHLORIDE, PRESERVATIVE FREE 10 ML: 5 INJECTION INTRAVENOUS at 08:50

## 2023-07-21 RX ADMIN — METOPROLOL SUCCINATE 25 MG: 25 TABLET, EXTENDED RELEASE ORAL at 08:50

## 2023-07-21 ASSESSMENT — PAIN SCALES - GENERAL
PAINLEVEL_OUTOF10: 0

## 2023-07-21 NOTE — PROGRESS NOTES
2 week CAM monitor # [id number] applied per order Chavez Sparrow CNP for Afib . Instructions given and questions answered. Bedside nurse aware.

## 2023-07-22 LAB
ANION GAP SERPL CALCULATED.3IONS-SCNC: 6 MMOL/L (ref 3–16)
BUN SERPL-MCNC: 37 MG/DL (ref 7–20)
CALCIUM SERPL-MCNC: 8.2 MG/DL (ref 8.3–10.6)
CHLORIDE SERPL-SCNC: 103 MMOL/L (ref 99–110)
CO2 SERPL-SCNC: 26 MMOL/L (ref 21–32)
CREAT SERPL-MCNC: 1.7 MG/DL (ref 0.8–1.3)
GFR SERPLBLD CREATININE-BSD FMLA CKD-EPI: 40 ML/MIN/{1.73_M2}
GLUCOSE SERPL-MCNC: 97 MG/DL (ref 70–99)
IRON SATN MFR SERPL: 11 % (ref 20–50)
IRON SERPL-MCNC: 27 UG/DL (ref 59–158)
POTASSIUM SERPL-SCNC: 4.3 MMOL/L (ref 3.5–5.1)
SODIUM SERPL-SCNC: 135 MMOL/L (ref 136–145)
TIBC SERPL-MCNC: 256 UG/DL (ref 260–445)

## 2023-07-22 PROCEDURE — 6370000000 HC RX 637 (ALT 250 FOR IP): Performed by: INTERNAL MEDICINE

## 2023-07-22 PROCEDURE — 2580000003 HC RX 258: Performed by: INTERNAL MEDICINE

## 2023-07-22 PROCEDURE — 6370000000 HC RX 637 (ALT 250 FOR IP): Performed by: UROLOGY

## 2023-07-22 PROCEDURE — 1200000000 HC SEMI PRIVATE

## 2023-07-22 PROCEDURE — 36415 COLL VENOUS BLD VENIPUNCTURE: CPT

## 2023-07-22 PROCEDURE — 99233 SBSQ HOSP IP/OBS HIGH 50: CPT | Performed by: INTERNAL MEDICINE

## 2023-07-22 PROCEDURE — 6370000000 HC RX 637 (ALT 250 FOR IP)

## 2023-07-22 PROCEDURE — 80048 BASIC METABOLIC PNL TOTAL CA: CPT

## 2023-07-22 RX ORDER — SENNA AND DOCUSATE SODIUM 50; 8.6 MG/1; MG/1
1 TABLET, FILM COATED ORAL ONCE
Status: COMPLETED | OUTPATIENT
Start: 2023-07-22 | End: 2023-07-22

## 2023-07-22 RX ADMIN — PANTOPRAZOLE SODIUM 40 MG: 40 TABLET, DELAYED RELEASE ORAL at 16:41

## 2023-07-22 RX ADMIN — FUROSEMIDE 20 MG: 20 TABLET ORAL at 09:00

## 2023-07-22 RX ADMIN — METOPROLOL SUCCINATE 25 MG: 25 TABLET, EXTENDED RELEASE ORAL at 09:00

## 2023-07-22 RX ADMIN — SENNOSIDES AND DOCUSATE SODIUM 1 TABLET: 50; 8.6 TABLET ORAL at 12:05

## 2023-07-22 RX ADMIN — TAMSULOSIN HYDROCHLORIDE 0.4 MG: 0.4 CAPSULE ORAL at 20:56

## 2023-07-22 RX ADMIN — ASPIRIN 81 MG 81 MG: 81 TABLET ORAL at 09:00

## 2023-07-22 RX ADMIN — PANTOPRAZOLE SODIUM 40 MG: 40 TABLET, DELAYED RELEASE ORAL at 05:48

## 2023-07-22 RX ADMIN — SODIUM CHLORIDE, PRESERVATIVE FREE 10 ML: 5 INJECTION INTRAVENOUS at 20:56

## 2023-07-22 RX ADMIN — SODIUM CHLORIDE, PRESERVATIVE FREE 10 ML: 5 INJECTION INTRAVENOUS at 09:00

## 2023-07-22 RX ADMIN — ATORVASTATIN CALCIUM 40 MG: 40 TABLET, FILM COATED ORAL at 20:56

## 2023-07-22 ASSESSMENT — PAIN SCALES - GENERAL: PAINLEVEL_OUTOF10: 0

## 2023-07-23 ENCOUNTER — HOSPITAL ENCOUNTER (INPATIENT)
Age: 83
DRG: 091 | End: 2023-07-23
Attending: PHYSICAL MEDICINE & REHABILITATION | Admitting: PHYSICAL MEDICINE & REHABILITATION
Payer: MEDICARE

## 2023-07-23 VITALS
HEIGHT: 72 IN | OXYGEN SATURATION: 98 % | SYSTOLIC BLOOD PRESSURE: 101 MMHG | TEMPERATURE: 97.6 F | WEIGHT: 131.61 LBS | RESPIRATION RATE: 20 BRPM | HEART RATE: 70 BPM | BODY MASS INDEX: 17.83 KG/M2 | DIASTOLIC BLOOD PRESSURE: 53 MMHG

## 2023-07-23 PROBLEM — R53.81 DEBILITY: Status: ACTIVE | Noted: 2023-07-23

## 2023-07-23 PROCEDURE — 6370000000 HC RX 637 (ALT 250 FOR IP): Performed by: INTERNAL MEDICINE

## 2023-07-23 PROCEDURE — 2580000003 HC RX 258: Performed by: INTERNAL MEDICINE

## 2023-07-23 PROCEDURE — 94761 N-INVAS EAR/PLS OXIMETRY MLT: CPT

## 2023-07-23 PROCEDURE — 2700000000 HC OXYGEN THERAPY PER DAY

## 2023-07-23 PROCEDURE — 2580000003 HC RX 258: Performed by: PHYSICAL MEDICINE & REHABILITATION

## 2023-07-23 PROCEDURE — 1280000000 HC REHAB R&B

## 2023-07-23 PROCEDURE — 6370000000 HC RX 637 (ALT 250 FOR IP)

## 2023-07-23 PROCEDURE — 99233 SBSQ HOSP IP/OBS HIGH 50: CPT | Performed by: NURSE PRACTITIONER

## 2023-07-23 PROCEDURE — 94150 VITAL CAPACITY TEST: CPT

## 2023-07-23 PROCEDURE — 94640 AIRWAY INHALATION TREATMENT: CPT

## 2023-07-23 PROCEDURE — 99232 SBSQ HOSP IP/OBS MODERATE 35: CPT | Performed by: INTERNAL MEDICINE

## 2023-07-23 PROCEDURE — 6370000000 HC RX 637 (ALT 250 FOR IP): Performed by: PHYSICAL MEDICINE & REHABILITATION

## 2023-07-23 RX ORDER — TAMSULOSIN HYDROCHLORIDE 0.4 MG/1
0.4 CAPSULE ORAL NIGHTLY
Qty: 30 CAPSULE | Refills: 3 | Status: ON HOLD | OUTPATIENT
Start: 2023-07-23 | End: 2023-07-24

## 2023-07-23 RX ORDER — POLYETHYLENE GLYCOL 3350 17 G/17G
17 POWDER, FOR SOLUTION ORAL DAILY PRN
Status: DISCONTINUED | OUTPATIENT
Start: 2023-07-23 | End: 2023-08-04 | Stop reason: HOSPADM

## 2023-07-23 RX ORDER — ATORVASTATIN CALCIUM 40 MG/1
40 TABLET, FILM COATED ORAL NIGHTLY
Status: CANCELLED | OUTPATIENT
Start: 2023-07-23

## 2023-07-23 RX ORDER — POTASSIUM CHLORIDE 7.45 MG/ML
10 INJECTION INTRAVENOUS PRN
Status: DISCONTINUED | OUTPATIENT
Start: 2023-07-23 | End: 2023-08-04 | Stop reason: HOSPADM

## 2023-07-23 RX ORDER — FUROSEMIDE 20 MG/1
20 TABLET ORAL DAILY
Qty: 60 TABLET | Refills: 3 | Status: ON HOLD | OUTPATIENT
Start: 2023-07-24 | End: 2023-07-24

## 2023-07-23 RX ORDER — FUROSEMIDE 20 MG/1
20 TABLET ORAL DAILY
Status: DISCONTINUED | OUTPATIENT
Start: 2023-07-24 | End: 2023-08-04 | Stop reason: HOSPADM

## 2023-07-23 RX ORDER — PANTOPRAZOLE SODIUM 40 MG/1
40 TABLET, DELAYED RELEASE ORAL
Status: CANCELLED | OUTPATIENT
Start: 2023-07-23

## 2023-07-23 RX ORDER — IPRATROPIUM BROMIDE AND ALBUTEROL SULFATE 2.5; .5 MG/3ML; MG/3ML
1 SOLUTION RESPIRATORY (INHALATION) EVERY 4 HOURS PRN
Status: CANCELLED | OUTPATIENT
Start: 2023-07-23

## 2023-07-23 RX ORDER — ATORVASTATIN CALCIUM 40 MG/1
40 TABLET, FILM COATED ORAL NIGHTLY
Qty: 30 TABLET | Refills: 3 | Status: ON HOLD | OUTPATIENT
Start: 2023-07-23 | End: 2023-07-24

## 2023-07-23 RX ORDER — POTASSIUM CHLORIDE 20 MEQ/1
40 TABLET, EXTENDED RELEASE ORAL PRN
Status: DISCONTINUED | OUTPATIENT
Start: 2023-07-23 | End: 2023-08-04 | Stop reason: HOSPADM

## 2023-07-23 RX ORDER — ASPIRIN 81 MG/1
81 TABLET, CHEWABLE ORAL DAILY
Status: CANCELLED | OUTPATIENT
Start: 2023-07-24

## 2023-07-23 RX ORDER — FERROUS SULFATE 325(65) MG
325 TABLET ORAL
Qty: 60 TABLET | Refills: 0 | Status: ON HOLD | OUTPATIENT
Start: 2023-07-23 | End: 2023-07-24

## 2023-07-23 RX ORDER — SODIUM CHLORIDE 0.9 % (FLUSH) 0.9 %
5-40 SYRINGE (ML) INJECTION EVERY 12 HOURS SCHEDULED
Status: DISCONTINUED | OUTPATIENT
Start: 2023-07-23 | End: 2023-07-25

## 2023-07-23 RX ORDER — ASPIRIN 81 MG/1
81 TABLET, CHEWABLE ORAL DAILY
Status: DISCONTINUED | OUTPATIENT
Start: 2023-07-24 | End: 2023-07-26

## 2023-07-23 RX ORDER — MAGNESIUM SULFATE IN WATER 40 MG/ML
2000 INJECTION, SOLUTION INTRAVENOUS PRN
Status: DISCONTINUED | OUTPATIENT
Start: 2023-07-23 | End: 2023-08-04 | Stop reason: HOSPADM

## 2023-07-23 RX ORDER — BISACODYL 5 MG/1
5 TABLET, DELAYED RELEASE ORAL DAILY
Status: DISCONTINUED | OUTPATIENT
Start: 2023-07-23 | End: 2023-08-04 | Stop reason: HOSPADM

## 2023-07-23 RX ORDER — TAMSULOSIN HYDROCHLORIDE 0.4 MG/1
0.4 CAPSULE ORAL NIGHTLY
Status: DISCONTINUED | OUTPATIENT
Start: 2023-07-23 | End: 2023-08-04 | Stop reason: HOSPADM

## 2023-07-23 RX ORDER — NYSTATIN 100000 U/G
CREAM TOPICAL 2 TIMES DAILY
Status: DISCONTINUED | OUTPATIENT
Start: 2023-07-23 | End: 2023-08-04 | Stop reason: HOSPADM

## 2023-07-23 RX ORDER — ONDANSETRON 4 MG/1
4 TABLET, ORALLY DISINTEGRATING ORAL EVERY 8 HOURS PRN
Status: DISCONTINUED | OUTPATIENT
Start: 2023-07-23 | End: 2023-08-04 | Stop reason: HOSPADM

## 2023-07-23 RX ORDER — IPRATROPIUM BROMIDE AND ALBUTEROL SULFATE 2.5; .5 MG/3ML; MG/3ML
1 SOLUTION RESPIRATORY (INHALATION) EVERY 4 HOURS PRN
Status: DISCONTINUED | OUTPATIENT
Start: 2023-07-23 | End: 2023-08-04 | Stop reason: HOSPADM

## 2023-07-23 RX ORDER — BISACODYL 5 MG/1
5 TABLET, DELAYED RELEASE ORAL DAILY
Status: CANCELLED | OUTPATIENT
Start: 2023-07-23

## 2023-07-23 RX ORDER — POTASSIUM CHLORIDE 7.45 MG/ML
10 INJECTION INTRAVENOUS PRN
Status: CANCELLED | OUTPATIENT
Start: 2023-07-23

## 2023-07-23 RX ORDER — PANTOPRAZOLE SODIUM 40 MG/1
40 TABLET, DELAYED RELEASE ORAL
Status: DISCONTINUED | OUTPATIENT
Start: 2023-07-23 | End: 2023-08-04 | Stop reason: HOSPADM

## 2023-07-23 RX ORDER — TIOTROPIUM BROMIDE 18 UG/1
18 CAPSULE ORAL; RESPIRATORY (INHALATION) DAILY
Qty: 90 CAPSULE | Refills: 1 | Status: ON HOLD | OUTPATIENT
Start: 2023-07-23 | End: 2023-07-24

## 2023-07-23 RX ORDER — METOPROLOL SUCCINATE 25 MG/1
25 TABLET, EXTENDED RELEASE ORAL DAILY
Qty: 30 TABLET | Refills: 3 | Status: ON HOLD | OUTPATIENT
Start: 2023-07-24 | End: 2023-07-24

## 2023-07-23 RX ORDER — SODIUM CHLORIDE 0.9 % (FLUSH) 0.9 %
5-40 SYRINGE (ML) INJECTION PRN
Status: DISCONTINUED | OUTPATIENT
Start: 2023-07-23 | End: 2023-08-04 | Stop reason: HOSPADM

## 2023-07-23 RX ORDER — ACETAMINOPHEN 325 MG/1
650 TABLET ORAL EVERY 4 HOURS PRN
Status: DISCONTINUED | OUTPATIENT
Start: 2023-07-23 | End: 2023-08-04 | Stop reason: HOSPADM

## 2023-07-23 RX ORDER — FUROSEMIDE 20 MG/1
20 TABLET ORAL DAILY
Status: CANCELLED | OUTPATIENT
Start: 2023-07-24

## 2023-07-23 RX ORDER — ONDANSETRON 2 MG/ML
4 INJECTION INTRAMUSCULAR; INTRAVENOUS EVERY 6 HOURS PRN
Status: DISCONTINUED | OUTPATIENT
Start: 2023-07-23 | End: 2023-08-04 | Stop reason: HOSPADM

## 2023-07-23 RX ORDER — ONDANSETRON 2 MG/ML
4 INJECTION INTRAMUSCULAR; INTRAVENOUS EVERY 6 HOURS PRN
Status: CANCELLED | OUTPATIENT
Start: 2023-07-23

## 2023-07-23 RX ORDER — POTASSIUM CHLORIDE 20 MEQ/1
40 TABLET, EXTENDED RELEASE ORAL PRN
Status: CANCELLED | OUTPATIENT
Start: 2023-07-23

## 2023-07-23 RX ORDER — ONDANSETRON 4 MG/1
4 TABLET, ORALLY DISINTEGRATING ORAL EVERY 8 HOURS PRN
Status: CANCELLED | OUTPATIENT
Start: 2023-07-23

## 2023-07-23 RX ORDER — POLYETHYLENE GLYCOL 3350 17 G/17G
17 POWDER, FOR SOLUTION ORAL DAILY PRN
Status: CANCELLED | OUTPATIENT
Start: 2023-07-23

## 2023-07-23 RX ORDER — METOPROLOL SUCCINATE 25 MG/1
25 TABLET, EXTENDED RELEASE ORAL DAILY
Status: DISCONTINUED | OUTPATIENT
Start: 2023-07-24 | End: 2023-08-04 | Stop reason: HOSPADM

## 2023-07-23 RX ORDER — MAGNESIUM SULFATE IN WATER 40 MG/ML
2000 INJECTION, SOLUTION INTRAVENOUS PRN
Status: CANCELLED | OUTPATIENT
Start: 2023-07-23

## 2023-07-23 RX ORDER — METOPROLOL SUCCINATE 25 MG/1
25 TABLET, EXTENDED RELEASE ORAL DAILY
Status: CANCELLED | OUTPATIENT
Start: 2023-07-24

## 2023-07-23 RX ORDER — ATORVASTATIN CALCIUM 40 MG/1
40 TABLET, FILM COATED ORAL NIGHTLY
Status: DISCONTINUED | OUTPATIENT
Start: 2023-07-23 | End: 2023-08-04 | Stop reason: HOSPADM

## 2023-07-23 RX ORDER — PANTOPRAZOLE SODIUM 40 MG/1
40 TABLET, DELAYED RELEASE ORAL
Qty: 30 TABLET | Refills: 3 | Status: ON HOLD | OUTPATIENT
Start: 2023-07-23 | End: 2023-07-24

## 2023-07-23 RX ORDER — ACETAMINOPHEN 325 MG/1
650 TABLET ORAL EVERY 4 HOURS PRN
Status: CANCELLED | OUTPATIENT
Start: 2023-07-23

## 2023-07-23 RX ORDER — TAMSULOSIN HYDROCHLORIDE 0.4 MG/1
0.4 CAPSULE ORAL NIGHTLY
Status: CANCELLED | OUTPATIENT
Start: 2023-07-23

## 2023-07-23 RX ORDER — SODIUM CHLORIDE 0.9 % (FLUSH) 0.9 %
5-40 SYRINGE (ML) INJECTION PRN
Status: CANCELLED | OUTPATIENT
Start: 2023-07-23

## 2023-07-23 RX ORDER — SODIUM CHLORIDE 0.9 % (FLUSH) 0.9 %
5-40 SYRINGE (ML) INJECTION EVERY 12 HOURS SCHEDULED
Status: CANCELLED | OUTPATIENT
Start: 2023-07-23

## 2023-07-23 RX ADMIN — TAMSULOSIN HYDROCHLORIDE 0.4 MG: 0.4 CAPSULE ORAL at 21:24

## 2023-07-23 RX ADMIN — SODIUM CHLORIDE, PRESERVATIVE FREE 10 ML: 5 INJECTION INTRAVENOUS at 21:24

## 2023-07-23 RX ADMIN — PANTOPRAZOLE SODIUM 40 MG: 40 TABLET, DELAYED RELEASE ORAL at 18:40

## 2023-07-23 RX ADMIN — SODIUM CHLORIDE, PRESERVATIVE FREE 10 ML: 5 INJECTION INTRAVENOUS at 18:40

## 2023-07-23 RX ADMIN — NYSTATIN: 100000 CREAM TOPICAL at 21:24

## 2023-07-23 RX ADMIN — FUROSEMIDE 20 MG: 20 TABLET ORAL at 09:40

## 2023-07-23 RX ADMIN — IPRATROPIUM BROMIDE AND ALBUTEROL SULFATE 1 DOSE: 2.5; .5 SOLUTION RESPIRATORY (INHALATION) at 10:36

## 2023-07-23 RX ADMIN — SODIUM CHLORIDE, PRESERVATIVE FREE 10 ML: 5 INJECTION INTRAVENOUS at 18:41

## 2023-07-23 RX ADMIN — SODIUM CHLORIDE, PRESERVATIVE FREE 10 ML: 5 INJECTION INTRAVENOUS at 09:56

## 2023-07-23 RX ADMIN — ASPIRIN 81 MG 81 MG: 81 TABLET ORAL at 09:40

## 2023-07-23 RX ADMIN — PANTOPRAZOLE SODIUM 40 MG: 40 TABLET, DELAYED RELEASE ORAL at 06:42

## 2023-07-23 RX ADMIN — SODIUM CHLORIDE, PRESERVATIVE FREE 10 ML: 5 INJECTION INTRAVENOUS at 09:44

## 2023-07-23 RX ADMIN — METOPROLOL SUCCINATE 25 MG: 25 TABLET, EXTENDED RELEASE ORAL at 09:40

## 2023-07-23 RX ADMIN — ATORVASTATIN CALCIUM 40 MG: 40 TABLET, FILM COATED ORAL at 21:23

## 2023-07-23 ASSESSMENT — PAIN SCALES - GENERAL: PAINLEVEL_OUTOF10: 0

## 2023-07-23 NOTE — PROGRESS NOTES
Pt taken to bathroom to attempt to urinate as part of bladder training post f/c removal. Pt reports unable to urinate but doesn't feel pressure or the urge. Will continue to monitor.

## 2023-07-23 NOTE — PROGRESS NOTES
NURSING ASSESSMENT: ARU ADMISSION  The [address] [facility]    Patient:Axel Blankenship     Rehab Dx/Hx: Brandan Juan [R53.81]   WBN:34/83/9350  [facility]:[id number]  Date of Admit: 7/23/2023  Room #: 3103/3103-01    Subjective:   Patient admitted to room  @ 1530 from [phone] via wheelchair. Alert and oriented x4. Oriented to room and call light system. Oriented to rehab routine and therapy schedules. Informed about care conferences and ordering of meals. Drug / Medication Review:   Medications were reviewed by RN at time of admission  [x]  No potential or actual clinically significant medication issues were noted.      []   Yes, a clinically significant medication issue was identified                 []  Adverse Drug Event:                  []  Allergy:                  []  Side Effect:                  []  Ineffective Therapy:                  []  Drug Interaction:                 []  Duplicated Therapy:                 []  Untreated Indication:                  []  Non-adherence:                 []  Other:                  Nursing/Pharmacy contacted the physician:     Date:              Time:                  Actions recommended by physician were completed:   Date :            Time:    4 Eyes Skin Assessment   The patient is being assessed for: Admission     I agree that 2 RN's have performed a thorough Head to Toe Skin Assessment on the patient. ALL assessment sites listed below have been assessed. Areas assessed by both nurses: Thais Cam RN and [facility] RN   [x]   Head, Face, and Ears   [x]   Shoulders, Back, and Chest, Abdomen  [x]   Arms, Elbows, and Hands   [x]   Coccyx, Sacrum, and Ischium  [x]   Legs, Feet, and Heel     Does the Patient have Skin Breakdown? Pt has scattered bruising to his BUE. He has pink blanchable bilat heels. Pt has red cuticles to all of his toes on each foot. His joi area is light pink with a splotchy rash. Pt has several red and blanchable areas to bilat buttocks and sacrum.

## 2023-07-23 NOTE — PROGRESS NOTES
Pt has thickened toe nails and areas of non blanchable redness around toe nails bilaterally. Reports some pain with pressure. Keeping sheets loose on feet to prevent pressure but may also suggest nail related issue. Podiatry consulted and call returned stating will come see. Will continue to monitor.

## 2023-07-23 NOTE — PLAN OF CARE
Problem: Discharge Planning  Goal: Discharge to home or other facility with appropriate resources  Outcome: Progressing  Flowsheets (Taken 7/23/2023 1807)  Discharge to home or other facility with appropriate resources: Identify barriers to discharge with patient and caregiver     Problem: Safety - Adult  Goal: Free from fall injury  Outcome: Progressing     Problem: Skin/Tissue Integrity  Goal: Absence of new skin breakdown  Description: 1. Monitor for areas of redness and/or skin breakdown  2. Assess vascular access sites hourly  3. Every 4-6 hours minimum:  Change oxygen saturation probe site  4. Every 4-6 hours:  If on nasal continuous positive airway pressure, respiratory therapy assess nares and determine need for appliance change or resting period.   Outcome: Progressing     Problem: ABCDS Injury Assessment  Goal: Absence of physical injury  Outcome: Progressing

## 2023-07-23 NOTE — CARE COORDINATION
CM spoke with Adrianna Mojica in [address] [facility] [address], pt has been accepted to [facility] ARU and can admit to unit on Sunday 7/23. Pt agreeable.     Thank you  Cat Tori Diaz RN, BSN, 70 \[facility]\""U   [phone]
CM spoke with pt at bedside, given VA resources to apply for potential benefits d/t serving in [employer] and [employer]. Pt verbalized understanding of resources and benefits application process. Pt also given list of [facility] PCP/specialists. Anticipate no further CM needs at [place]. Please contact CM if needs arise.     Thank you  Cat Lizbeth Camp RN, BSN, [facility]   [phone]
Case Management Assessment            Discharge Note                    Date / Time of Note: 7/23/2023 12:25 PM                  Discharge Note Completed by: Bernard Delacruz RN    Patient Name: Dexter Adair   YOB: 1940  Diagnosis: CHF (congestive heart failure), NYHA class I, acute on chronic, combined ([address]) [I50.43]  Heart failure ([address]) [I50.9]   Date / Time: 7/12/2023 10:21 PM    Current PCP: No primary care provider on file. Advance Directives:  Code Status: Full Code    Financial:  Payor: [facility] / Plan: MEDICARE PART A AND B / Product Type: *No Product type* /      Pharmacy:    [address] [id number] Willie Kong, [facility] [place] [address] [place] [address]  Phone: [phone] Fax: [phone] [address] [phone] - f [phone]  [address] [place] [address]  Phone: [phone] Fax: [phone]      ADLS:  Current PT AM-PAC Score: 16 /24  Current OT AM-PAC Score: 17 /24    DISCHARGE Disposition: [facility], [facility]. Acute Rehab Unit       Transportation:  Intrahospital transportation    COVID Result:  No results found for: [address] [place] [address]    The Plan for Transition of Care is related to the following treatment goals of CHF (congestive heart failure), NYHA class I, acute on chronic, combined ([address]) [I50.43]  Heart failure ([address]) [I50.9]    The Patient and/or patient representative Axel and his family were provided with a choice of provider and agrees with the discharge plan Yes    Freedom of choice list was provided with basic dialogue that supports the patient's individualized plan of care/goals and shares the quality data associated with the providers.  Yes      Bernard Delacruz RN  [facility] [employer]  Case Management Department  [phone]
Potential DME:    Patient expects to discharge to: [address] [facility] for transportation at discharge:      Financial    Payor: Bill Jerome / Plan: MEDICARE PART A AND B / Product Type: *No Product type* /     Does insurance require precert for SNF: No    Potential assistance Purchasing Medications: No  [facility] [id number] [address], [facility]  [facility] [place]  Phone: [phone] Fax: [phone] [address], [address] [facility] [phone] - F [phone]  [facility] ([place], [place])  Phone: [phone] Fax: [phone]      Notes:    Factors facilitating achievement of predicted outcomes: Motivated, Cooperative, and Pleasant    Barriers to discharge: Medical complications    Additional Case Management Notes:     CM spoke with pt at bedside, lives at home alone- IPTA. Plan to return home with no CM needs. Pt does not have a PCP and per pt report has not seen a doctor since he was in the [address] gave pt [facility] PCP/Specialty Physician list.         The Plan for Transition of Care is related to the following treatment goals of CHF (congestive heart failure), NYHA class I, acute on chronic, combined ([address]) [I50.43]  Heart failure ([address]) [R95.7]    IF APPLICABLE: The Patient and/or patient representative Axel and his family were provided with a choice of provider and agrees with the discharge plan. Freedom of choice list with basic dialogue that supports the patient's individualized plan of care/goals and shares the quality data associated with the providers was provided to: Patient   Patient Representative Name:       The Patient and/or Patient Representative Agree with the Discharge Plan?  Yes    Fred Gibson  Case Management Department  Ph: [phone]

## 2023-07-23 NOTE — PROGRESS NOTES
ARU Admission Assessment    Ethnicity  \"Are you of , /a, or Nauruan origin? \"  Check all that apply:  [x] A. No, not of , /a, or [place] Origin  [] B.  Yes, [place], [place] American, Chicano/a  [] C.  Yes, [facility]  [] D.  Yes, [place]  [] E.  Yes, another , , or Nauruan origin  [] X. Patient unable to respond  [] Y. Patient declines to respond    Race  \"What is your race? \"  Check all that apply:  [x] A. White  [] B. Black or   [] C. American Himanshu or [place] Native  [] D.  Himanshu  [] E. Mal[place]  [] F. Maldivian  [] G. [place]  [] Paula Bertha  [] I. [place]  [] J.  Other   [] K.   [] L. Croatian or Tammy  [] M. Vietnamese  [] N. Other -[address] [place] [address]  [] X. Patient unable to respond  [] Y. Patient declines to respond  [] Z. None of the above    Language  A. \"What is your preferred language? \"   English    B. \"Do you need or want an  to communicate with a doctor or health care staff? \"  Check only one:  [x] 0. No  [] 1. Yes  [] 9. Unable to determine    Transportation  \"Has lack of transportation kept you from medical appointments, meetings, work, or from getting things needed for daily living? \"Check all that apply:  [] A.  Yes, it has kept me from medical appointments or from getting my medications  [] B.  Yes, it has kept me from non-medical meetings, appointments, work, or from getting things that I need  [x] C.  No  [] X. Patient unable to respond  [] Y. Patient declines to respond    Hearing  Ability to hear (with hearing aid or hearing appliances if normally used)  []  0. Adequate - no difficulty in normal conversation, social interaction, listening to TV  [x]  1. Minimal difficulty - difficulty in some environments (e.g. when person speaks softly or setting is noisy)  []  2. Moderate difficulty - speaker has to increase volume and speak distinctly   []  3.   Highly impaired - absence of useful hearing    Vision  Ability to see in adequate light (with glasses or other visual appliances)  []  0. Adequate - sees fine detail, such as regular print in newspapers/books  [x]  1. Impaired - sees large print, but not regular print in newspapers/books  []  2. Moderately impaired - limited vision; not able to see newspaper headlines but can identify objects  []  3. Highly impaired - object identification in question, but eyes appear to follow objects  []  4. Severely impaired - no vision or sees only light, colors, or shapes; eyes do not appear to follow objects    Health Literacy  \"How often do you need to have someone help you when you read instructions, pamphlets, or other written material from your doctor or pharmacy? \"  [x]  0. Never  []  1. Rarely  []  2. Sometimes  []  3. Often  []  4. Always  []  7. Patient declines to respond  []  8. Patient unable to respond    BIMS - **Must be completed in the flowsheet at admission prior to proceeding with Delirium Assessment**  [x] BIMS completed in flowsheet at admission  [] BIMS not completed due to patient unable to give appropriate related answers, see Staff Assessment in flowsheet    Signs and Symptoms of Delirium  A. Acute Onset Mental Status Change - Is there evidence of an acute change in mental status from the patient's baseline? [x] 0. No  [] 1. Yes    B. Inattention - Did the patient have difficulty focusing attention, for example being easily distractible or having difficulty keeping track of what was being said? [x]  0. Behavior not present  []  1. Behavior continuously present, does not fluctuate  []  2. Behavior present, fluctuates (comes and goes, changes in severity)    C. Disorganized thinking - Was the patient's thinking disorganized or incoherent (rambling or irrelevant conversation, unclear or illogical flow of ideas, or unpredictable switching from subject to subject)? [x]  0. Behavior not present  []  1.   Behavior

## 2023-07-23 NOTE — DISCHARGE SUMMARY
for 7/18 but was delayed due to GI bleed. Ischemic workup planned for OP by cardiology. Patient to follow up with Dr Ashley Isbell in one week on d/c.  -Continue metoprolol 25 mg daily. Acute blood loss anemia on chronic anemia   GI bleed   -Patient reported melena   -s/p EGD 7/19 showed clean base duodenal ulcers   -s/p 1 pRBC 7/18  -On p.o. Protonix 40 mg twice daily. -Iron panel indicative of iron deficiency anemia. We will also add oral iron upon discharge. MICHAEL on CKD   -Patient's baseline unknown as has not seen a Physician in many years. -P/w cr of 1.6 mg/DL, worsened up to 2 mg/DL, now improved after 1.7 Mg/DL and stable since. pAfib   -Off heparin ggt sec to GIB   -Metoprolol succinate 25 mg daily        Urinary retention   L Hydronephrosis   -CT in the ED showed severe left hydronephrosis which is likely chronic is there is diffuse left renal cortical thinning.  -s/p thomson catheter by urology on 7/19 due to retention   -on flomax   -Remove Thomson and voiding trial prior to discharge. Mild emphysema on CT   CT Chest showed Moderate right and small left pleural effusions with mild adjacent opacities in the lower lobes favored to represent associated atelectasis. Mild emphysema. 10.4 cm simple cyst is noted at the upper pole the right kidney. No imaging follow-up is recommended for the cyst.  Calcified granuloma right upper lobe which is believed to account for the 6 mm nodule on the radiograph. -will need PFTs OP      Tobacco use disorder   -Former smoker, stopped within 5 days PTA   -at least 36 PPD smoking history     Malnutrition, severe   -Cachectic with BMI 18  -RD following   -Nutritional supplements        The patient expressed appropriate understanding of and agreement with the discharge recommendations, medications, and plan.      Consults this admission:  IP CONSULT TO CARDIOLOGY  IP CONSULT TO HEART FAILURE NURSE/COORDINATOR  IP CONSULT TO DIETITIAN  IP CONSULT TO UROLOGY  IP

## 2023-07-23 NOTE — PROGRESS NOTES
Patient transported to ARU by wheelchair with belongings. Tele removed, Warren removed for void trial - pt has not yet urinated, pt has 2 PIVs and holter monitor on chest, box given to ARU nurse. VSS. No questions at this time.

## 2023-07-23 NOTE — PROGRESS NOTES
Pt arrived by wheelchair from 4S without O2. Assisted to bed with gait belt and walker. Vitals taken. Pulse Ox WNL, continuing with room air. Glasses, hearing aids, and phone and chargers are with patient at bedside. Bed alarm on. Will continue to monitor.

## 2023-07-23 NOTE — PLAN OF CARE
ARU PATIENT TREATMENT PLAN  The [address] [facility]  [address] [facility], [address] [place] [address]  [phone]    Abbi Forte    : 1940  Acct #: [de-identified]  MRN: [id number]  PHYSICIAN:  Jeannine Hernández DO  Primary Problem    Patient Active Problem List   Diagnosis    Chronic systolic (congestive) heart failure (HCC)    Heart failure (HCC)    Paroxysmal atrial fibrillation (HCC)    Ischemic cardiomyopathy    Hydronephrosis    MICHAEL (acute kidney injury) ([address])    Electrolyte imbalance    Hypertension    Acute combined systolic (congestive) and diastolic (congestive) heart failure (HCC)    Moderate mitral regurgitation    Pulmonary HTN (HCC)    Elevated troponin    Primary hypertension    Acute upper GI bleed    Debility    Stage 3 chronic kidney disease ([address])    Left-sided weakness    Dysarthria       Rehabilitation Diagnosis:  Cardiac, 9.0, Cardiac  ADMIT DATE:2023    Patient Goals: To Return Home  Admitting Impairments: Decreased functional mobility ; Decreased ADL status; Decreased endurance;Decreased strength;Decreased high-level IADLs;Decreased balance;Decreased coordination;Decreased ROM; Decreased safe awareness;Decreased fine motor control;Decreased posture  Activity Restrictions: None  Participation Limitations: None   CARE PLAN   NURSING:  Axel Blankenship while on this unit will:  [x] Be continent of bowel and bladder     [x] Have an adequate number of bowel movements  [x] Urinate with no urinary retention >300ml in bladder  [x] Complete bladder protocol with thomson removal  [] Maintain O2 SATs at ___%  [x] Have pain managed while on ARU       [] Be pain free by discharge   [x] Have no skin breakdown while on ARU  [] Have improved skin integrity via wound measurements  [] Have no signs/symptoms of infection at the wound site  [x] Be free from injury during hospitalization   [] Complete education with patient/family with understanding demonstrated for:  [] Adjustment   []

## 2023-07-23 NOTE — DISCHARGE INSTR - COC
Continuity of Care Form    Patient Name: Johanny Christianson   :  1940  MRN:  [id number]    Admit date:  2023  Discharge date:  ***    Code Status Order: Full Code   Advance Directives:   Advance Care Flowsheet Documentation       Date/Time Healthcare Directive Type of Healthcare Directive Copy in [address] Agent's Name Healthcare Agent's Phone Number    23 1231 Yes, patient has an advance directive for healthcare treatment -- Other (Comment)  patient is unsure Healthcare power of  [facility] [phone]            Admitting Physician:  Yasir Barnes MD  PCP: No primary care provider on file. Discharging Nurse: [facility] Unit/Room#: 4320/4320-01  Discharging Unit Phone Number: ***    Emergency Contact:   Extended Emergency Contact Information  Primary Emergency Contact: Renetta Blankenship  Mobile Phone: [phone]  Relation: Niece/Nephew   needed? No  Secondary Emergency Contact: [address] [place] [address] [place] Phone: [phone]  Relation: Brother/Sister   needed? No    Past Surgical History:  Past Surgical History:   Procedure Laterality Date    UPPER GASTROINTESTINAL ENDOSCOPY N/A 2023    EGD BIOPSY performed by Debby Villavicencio MD at [facility] ENDOSCOPY       Immunization History: There is no immunization history on file for this patient.     Active Problems:  Patient Active Problem List   Diagnosis Code    Chronic systolic (congestive) heart failure (HCC) I50.22    Heart failure (HCC) I50.9    Paroxysmal atrial fibrillation (HCC) I48.0    Ischemic cardiomyopathy I25.5    Hydronephrosis N13.30    MICHAEL (acute kidney injury) ([address]) N17.9    Electrolyte imbalance E87.8    Secondary hypertension I15.9    Acute combined systolic (congestive) and diastolic (congestive) heart failure (HCC) I50.41    Moderate mitral regurgitation I34.0    Pulmonary HTN (HCC) I27.20    Elevated troponin R77.8    Primary hypertension I10    Acute upper GI bleed

## 2023-07-23 NOTE — PLAN OF CARE
Pain/discomfort being managed with PRN analgesics per MD orders. Pt able to express presence and absence of pain and rate pain appropriately using numerical scale. Pt free from falls this shift. Fall precautions in place at all times. Call light always withinreach. Pt able and agreeable to contact for safety appropriately. Bed alarm on.
Plans to transfer to ARU for Rehab this afternoon. Voiding trial implemented. Problem: Discharge Planning  Goal: Discharge to home or other facility with appropriate resources  7/23/2023 1258 by Martin Venegas RN  Outcome: Progressing  Note: PT transfer to ARU expected.   7/23/2023 0315 by Brionna Kilpatrick RN  Outcome: Progressing  Flowsheets (Taken 7/23/2023 0315)  Discharge to home or other facility with appropriate resources:   Identify barriers to discharge with patient and caregiver   Arrange for needed discharge resources and transportation as appropriate   Identify discharge learning needs (meds, wound care, etc)     Problem: Safety - Adult  Goal: Free from fall injury  7/23/2023 1258 by Martin Venegas RN  Outcome: Progressing  [address] (Taken 7/23/2023 0315 by Brionna Kilpatrick RN)  Free From Fall Injury: Instruct family/caregiver on patient safety  7/23/2023 0315 by Brionna Kilpatrick RN  Outcome: Progressing  Flowsheets (Taken 7/23/2023 0315)  Free From Fall Injury: Instruct family/caregiver on patient safety     Problem: Chronic Conditions and Co-morbidities  Goal: Patient's chronic conditions and co-morbidity symptoms are monitored and maintained or improved  Outcome: Progressing  Flowsheets (Taken 7/21/2023 0800 by Ernestine Narvaez RN)  Care Plan - Patient's Chronic Conditions and Co-Morbidity Symptoms are Monitored and Maintained or Improved: Monitor and assess patient's chronic conditions and comorbid symptoms for stability, deterioration, or improvement     Problem: Cardiovascular - Adult  Goal: Maintains optimal cardiac output and hemodynamic stability  7/23/2023 1258 by Martin Venegas RN  Outcome: Progressing  Flowsheets (Taken 7/23/2023 0315 by Brionna Kilpatrick RN)  Maintains optimal cardiac output and hemodynamic stability:   Monitor blood pressure and heart rate   Monitor urine output and notify Licensed Independent Practitioner for values outside of normal range   Assess for signs of decreased
Problem: Discharge Planning  Goal: Discharge to home or other facility with appropriate resources  7/18/2023 1359 by Melonie Pryor RN  Outcome: Progressing  Flowsheets (Taken 7/18/2023 1359)  Discharge to home or other facility with appropriate resources:   Identify barriers to discharge with patient and caregiver   Arrange for needed discharge resources and transportation as appropriate   Identify discharge learning needs (meds, wound care, etc)   Arrange for interpreters to assist at discharge as needed   Refer to discharge planning if patient needs post-hospital services based on physician order or complex needs related to functional status, cognitive ability or social support system     Problem: Safety - Adult  Goal: Free from fall injury  7/18/2023 1359 by Melonie Pryor RN  Outcome: Progressing  Flowsheets (Taken 7/18/2023 1359)  Free From Fall Injury:   Instruct family/caregiver on patient safety   Based on caregiver fall risk screen, instruct family/caregiver to ask for assistance with transferring infant if caregiver noted to have fall risk factors  Note: Educated pt on using call light prior to getting out of bed. Bed locked and in lowest position. Alarm on. Problem: Nutrition Deficit:  Goal: Optimize nutritional status  Outcome: Progressing  Flowsheets (Taken 7/18/2023 1359)  Nutrient intake appropriate for improving, restoring, or maintaining nutritional needs:   Assess nutritional status and recommend course of action   Monitor oral intake, labs, and treatment plans   Recommend appropriate diets, oral nutritional supplements, and vitamin/mineral supplements   Order, calculate, and assess calorie counts as needed   Recommend, monitor, and adjust tube feedings and TPN/PPN based on assessed needs   Provide specific nutrition education to patient or family as appropriate  Note: Pt diet change to clear liquid due to GI bleed and planned EGD tomorrow.      Problem: Respiratory - Adult  Goal:
Problem: Discharge Planning  Goal: Discharge to home or other facility with appropriate resources  7/20/2023 2254 by Wili Simmons RN  Outcome: Progressing  7/20/2023 1234 by Deanna Brown RN  Outcome: Progressing     Problem: Safety - Adult  Goal: Free from fall injury  7/20/2023 2254 by Wili Simmons RN  Outcome: Progressing  7/20/2023 1234 by Deanna Brown RN  Outcome: Progressing  Flowsheets (Taken 7/20/2023 1234)  Free From Fall Injury:   Instruct family/caregiver on patient safety   Based on caregiver fall risk screen, instruct family/caregiver to ask for assistance with transferring infant if caregiver noted to have fall risk factors     Problem: Chronic Conditions and Co-morbidities  Goal: Patient's chronic conditions and co-morbidity symptoms are monitored and maintained or improved  7/20/2023 2254 by Wili Simmons RN  Outcome: Progressing  7/20/2023 1234 by Deanna Brown RN  Outcome: Progressing     Problem: Cardiovascular - Adult  Goal: Maintains optimal cardiac output and hemodynamic stability  7/20/2023 2254 by Wili Simmons RN  Outcome: Progressing  Flowsheets (Taken 7/20/2023 2010)  Maintains optimal cardiac output and hemodynamic stability:   Monitor blood pressure and heart rate   Monitor urine output and notify Licensed Independent Practitioner for values outside of normal range   Assess for signs of decreased cardiac output  7/20/2023 1234 by Deanna Brown RN  Outcome: Progressing  Goal: Absence of cardiac dysrhythmias or at baseline  7/20/2023 2254 by Wili Simmons RN  Outcome: Progressing  [address] (Taken 7/20/2023 2010)  Absence of cardiac dysrhythmias or at baseline:   Monitor cardiac rate and rhythm   Assess for signs of decreased cardiac output  7/20/2023 1234 by Deanna Brown RN  Outcome: Progressing     Problem: Respiratory - Adult  Goal: Achieves optimal ventilation and oxygenation  7/20/2023 2254 by Wili Simmons RN  Outcome: Progressing  Flowsheets (Taken 7/20/2023 2010)  Achieves
Problem: Discharge Planning  Goal: Discharge to home or other facility with appropriate resources  7/21/2023 0926 by Viri Colon RN  Outcome: Progressing     Problem: Safety - Adult  Goal: Free from fall injury  7/21/2023 0926 by Viri Colon RN  Outcome: Progressing     Problem: Chronic Conditions and Co-morbidities  Goal: Patient's chronic conditions and co-morbidity symptoms are monitored and maintained or improved  7/21/2023 0926 by Viri Colon RN  Outcome: Progressing     Problem: Cardiovascular - Adult  Goal: Maintains optimal cardiac output and hemodynamic stability  7/21/2023 0926 by Viri Colon RN  Outcome: Progressing     Problem: Cardiovascular - Adult  Goal: Absence of cardiac dysrhythmias or at baseline  7/21/2023 0926 by Viri Colon RN  Outcome: Progressing     Problem: Respiratory - Adult  Goal: Achieves optimal ventilation and oxygenation  7/21/2023 0926 by Viri Colon RN  Outcome: Progressing     Problem: Metabolic/Fluid and Electrolytes - Adult  Goal: Electrolytes maintained within normal limits  7/21/2023 0926 by Viri Colon RN  Outcome: Progressing     Problem: Metabolic/Fluid and Electrolytes - Adult  Goal: Hemodynamic stability and optimal renal function maintained  7/21/2023 0926 by Viri Colon RN  Outcome: Progressing     Problem: Pain  Goal: Verbalizes/displays adequate comfort level or baseline comfort level  7/21/2023 0926 by Viri Colon RN  Outcome: Progressing  Flowsheets (Taken 7/21/2023 0848)  Verbalizes/displays adequate comfort level or baseline comfort level: Encourage patient to monitor pain and request assistance     Problem: Nutrition Deficit:  Goal: Optimize nutritional status  7/21/2023 0926 by Viri Colon RN  Outcome: Progressing     Problem: ABCDS Injury Assessment  Goal: Absence of physical injury  7/21/2023 0926 by Viri Colon RN  Outcome: Progressing
Problem: Discharge Planning  Goal: Discharge to home or other facility with appropriate resources  7/22/2023 1259 by Bing Lemon RN  Outcome: Progressing  Flowsheets (Taken 7/22/2023 1259)  Discharge to home or other facility with appropriate resources:   Refer to discharge planning if patient needs post-hospital services based on physician order or complex needs related to functional status, cognitive ability or social support system   Identify discharge learning needs (meds, wound care, etc)   Arrange for needed discharge resources and transportation as appropriate   Identify barriers to discharge with patient and caregiver  7/22/2023 0107 by Triston Brody RN  Outcome: Progressing     Problem: Safety - Adult  Goal: Free from fall injury  7/22/2023 1259 by Bing Lemon RN  Outcome: Progressing  Flowsheets (Taken 7/21/2023 0800 by Shanda Frederick RN)  Free From Fall Injury: Instruct family/caregiver on patient safety  7/22/2023 0107 by Triston Brody RN  Outcome: Progressing     Problem: Chronic Conditions and Co-morbidities  Goal: Patient's chronic conditions and co-morbidity symptoms are monitored and maintained or improved  7/22/2023 1259 by Bing Lemon RN  Outcome: Progressing  Flowsheets (Taken 7/21/2023 0800 by Shanda Frederick RN)  Care Plan - Patient's Chronic Conditions and Co-Morbidity Symptoms are Monitored and Maintained or Improved: Monitor and assess patient's chronic conditions and comorbid symptoms for stability, deterioration, or improvement  7/22/2023 0107 by Triston Brody RN  Outcome: Progressing     Problem: Cardiovascular - Adult  Goal: Maintains optimal cardiac output and hemodynamic stability  7/22/2023 1259 by Bing Lemon RN  Outcome: Progressing  Flowsheets (Taken 7/22/2023 1259)  Maintains optimal cardiac output and hemodynamic stability:   Monitor blood pressure and heart rate   Monitor urine output and notify Licensed Independent Practitioner for values outside of normal
Problem: Discharge Planning  Goal: Discharge to home or other facility with appropriate resources  Outcome: Progressing     Problem: Safety - Adult  Goal: Free from fall injury  Outcome: Progressing     Problem: Chronic Conditions and Co-morbidities  Goal: Patient's chronic conditions and co-morbidity symptoms are monitored and maintained or improved  Outcome: Progressing     Problem: Cardiovascular - Adult  Goal: Maintains optimal cardiac output and hemodynamic stability  Outcome: Progressing  Goal: Absence of cardiac dysrhythmias or at baseline  Outcome: Progressing     Problem: Respiratory - Adult  Goal: Achieves optimal ventilation and oxygenation  Outcome: Progressing     Problem: Metabolic/Fluid and Electrolytes - Adult  Goal: Electrolytes maintained within normal limits  Outcome: Progressing  Goal: Hemodynamic stability and optimal renal function maintained  Outcome: Progressing     Problem: Pain  Goal: Verbalizes/displays adequate comfort level or baseline comfort level  Outcome: Progressing     Problem: Nutrition Deficit:  Goal: Optimize nutritional status  Outcome: Progressing     Problem: ABCDS Injury Assessment  Goal: Absence of physical injury  Outcome: Progressing
Problem: Discharge Planning  Goal: Discharge to home or other facility with appropriate resources  Outcome: Progressing  Flowsheets (Taken 7/13/2023 0316)  Discharge to home or other facility with appropriate resources:   Identify barriers to discharge with patient and caregiver   Arrange for needed discharge resources and transportation as appropriate   Identify discharge learning needs (meds, wound care, etc)   Refer to discharge planning if patient needs post-hospital services based on physician order or complex needs related to functional status, cognitive ability or social support system     Problem: Safety - Adult  Goal: Free from fall injury  Outcome: Progressing  Flowsheets (Taken 7/13/2023 0316)  Free From Fall Injury: Instruct family/caregiver on patient safety  Note: Fall protocol in place      Problem: Cardiovascular - Adult  Goal: Absence of cardiac dysrhythmias or at baseline  Outcome: Progressing  Flowsheets (Taken 7/13/2023 0316)  Absence of cardiac dysrhythmias or at baseline:   Monitor cardiac rate and rhythm   Assess for signs of decreased cardiac output   Administer antiarrhythmia medication and electrolyte replacement as ordered     Problem: Respiratory - Adult  Goal: Achieves optimal ventilation and oxygenation  Outcome: Progressing  Flowsheets (Taken 7/13/2023 0316)  Achieves optimal ventilation and oxygenation:   Assess for changes in respiratory status   Assess for changes in mentation and behavior   Position to facilitate oxygenation and minimize respiratory effort   Oxygen supplementation based on oxygen saturation or arterial blood gases   Encourage broncho-pulmonary hygiene including cough, deep breathe, incentive spirometry   Assess the need for suctioning and aspirate as needed   Assess and instruct to report shortness of breath or any respiratory difficulty   Respiratory therapy support as indicated
Problem: Discharge Planning  Goal: Discharge to home or other facility with appropriate resources  Outcome: Progressing  Flowsheets (Taken 7/14/2023 0057)  Discharge to home or other facility with appropriate resources:   Identify barriers to discharge with patient and caregiver   Arrange for needed discharge resources and transportation as appropriate   Identify discharge learning needs (meds, wound care, etc)   Refer to discharge planning if patient needs post-hospital services based on physician order or complex needs related to functional status, cognitive ability or social support system     Problem: Safety - Adult  Goal: Free from fall injury  Outcome: Progressing  Flowsheets (Taken 7/14/2023 0057)  Free From Fall Injury: Instruct family/caregiver on patient safety  Note: Fall protocol in place      Problem: Cardiovascular - Adult  Goal: Absence of cardiac dysrhythmias or at baseline  Outcome: Progressing  Flowsheets (Taken 7/14/2023 0057)  Absence of cardiac dysrhythmias or at baseline:   Monitor cardiac rate and rhythm   Assess for signs of decreased cardiac output   Administer antiarrhythmia medication and electrolyte replacement as ordered     Problem: Respiratory - Adult  Goal: Achieves optimal ventilation and oxygenation  Outcome: Progressing  Flowsheets (Taken 7/14/2023 0057)  Achieves optimal ventilation and oxygenation:   Assess for changes in respiratory status   Assess for changes in mentation and behavior   Position to facilitate oxygenation and minimize respiratory effort   Oxygen supplementation based on oxygen saturation or arterial blood gases   Encourage broncho-pulmonary hygiene including cough, deep breathe, incentive spirometry   Assess the need for suctioning and aspirate as needed   Assess and instruct to report shortness of breath or any respiratory difficulty   Respiratory therapy support as indicated
Problem: Discharge Planning  Goal: Discharge to home or other facility with appropriate resources  Outcome: Progressing  Flowsheets (Taken 7/14/2023 1838)  Discharge to home or other facility with appropriate resources:   Identify barriers to discharge with patient and caregiver   Arrange for needed discharge resources and transportation as appropriate   Identify discharge learning needs (meds, wound care, etc)     Problem: Chronic Conditions and Co-morbidities  Goal: Patient's chronic conditions and co-morbidity symptoms are monitored and maintained or improved  Outcome: Progressing  Flowsheets (Taken 7/14/2023 1838)  Care Plan - Patient's Chronic Conditions and Co-Morbidity Symptoms are Monitored and Maintained or Improved:   Monitor and assess patient's chronic conditions and comorbid symptoms for stability, deterioration, or improvement   Collaborate with multidisciplinary team to address chronic and comorbid conditions and prevent exacerbation or deterioration   Update acute care plan with appropriate goals if chronic or comorbid symptoms are exacerbated and prevent overall improvement and discharge     Problem: Cardiovascular - Adult  Goal: Absence of cardiac dysrhythmias or at baseline  7/14/2023 1840 by Greta Peraza RN  Outcome: Progressing  Flowsheets (Taken 7/14/2023 1840)  Absence of cardiac dysrhythmias or at baseline:   Monitor cardiac rate and rhythm   Assess for signs of decreased cardiac output   Administer antiarrhythmia medication and electrolyte replacement as ordered  Note: At baseline.    7/14/2023 1838 by Greta Peraza RN  Outcome: Progressing  Flowsheets (Taken 7/14/2023 1838)  Absence of cardiac dysrhythmias or at baseline:   Monitor cardiac rate and rhythm   Assess for signs of decreased cardiac output   Administer antiarrhythmia medication and electrolyte replacement as ordered     Problem: Pain  Goal: Verbalizes/displays adequate comfort level or baseline comfort level  Outcome:
Problem: Discharge Planning  Goal: Discharge to home or other facility with appropriate resources  Outcome: Progressing  Flowsheets (Taken 7/18/2023 0333)  Discharge to home or other facility with appropriate resources:   Identify barriers to discharge with patient and caregiver   Arrange for needed discharge resources and transportation as appropriate     Problem: Safety - Adult  Goal: Free from fall injury  Outcome: Progressing  Flowsheets (Taken 7/17/2023 0534 by Tien Stratton RN)  Free From Fall Injury: Instruct family/caregiver on patient safety     Problem: Chronic Conditions and Co-morbidities  Goal: Patient's chronic conditions and co-morbidity symptoms are monitored and maintained or improved  Outcome: Progressing  Flowsheets (Taken 7/16/2023 1952 by Nedra Shahid RN)  Care Plan - Patient's Chronic Conditions and Co-Morbidity Symptoms are Monitored and Maintained or Improved:   Monitor and assess patient's chronic conditions and comorbid symptoms for stability, deterioration, or improvement   Collaborate with multidisciplinary team to address chronic and comorbid conditions and prevent exacerbation or deterioration
Problem: Discharge Planning  Goal: Discharge to home or other facility with appropriate resources  Outcome: Progressing  Flowsheets (Taken 7/18/2023 1359 by Lyly Hernandez RN)  Discharge to home or other facility with appropriate resources:   Identify barriers to discharge with patient and caregiver   Arrange for needed discharge resources and transportation as appropriate   Identify discharge learning needs (meds, wound care, etc)   Arrange for interpreters to assist at discharge as needed   Refer to discharge planning if patient needs post-hospital services based on physician order or complex needs related to functional status, cognitive ability or social support system     Problem: Safety - Adult  Goal: Free from fall injury  Outcome: Progressing  Flowsheets (Taken 7/18/2023 1359 by Lyly Hernandez RN)  Free From Fall Injury:   Instruct family/caregiver on patient safety   Based on caregiver fall risk screen, instruct family/caregiver to ask for assistance with transferring infant if caregiver noted to have fall risk factors     Problem: Chronic Conditions and Co-morbidities  Goal: Patient's chronic conditions and co-morbidity symptoms are monitored and maintained or improved  Flowsheets (Taken 7/16/2023 1952 by Rody Azevedo RN)  Care Plan - Patient's Chronic Conditions and Co-Morbidity Symptoms are Monitored and Maintained or Improved:   Monitor and assess patient's chronic conditions and comorbid symptoms for stability, deterioration, or improvement   Collaborate with multidisciplinary team to address chronic and comorbid conditions and prevent exacerbation or deterioration
Problem: Discharge Planning  Goal: Discharge to home or other facility with appropriate resources  Outcome: Progressing  Flowsheets (Taken 7/19/2023 2200)  Discharge to home or other facility with appropriate resources: Identify barriers to discharge with patient and caregiver     Problem: Safety - Adult  Goal: Free from fall injury  Outcome: Progressing  Flowsheets (Taken 7/19/2023 2020)  Free From Fall Injury: Instruct family/caregiver on patient safety     Problem: Chronic Conditions and Co-morbidities  Goal: Patient's chronic conditions and co-morbidity symptoms are monitored and maintained or improved  Outcome: Progressing  Flowsheets (Taken 7/19/2023 2200)  Care Plan - Patient's Chronic Conditions and Co-Morbidity Symptoms are Monitored and Maintained or Improved: Monitor and assess patient's chronic conditions and comorbid symptoms for stability, deterioration, or improvement     Problem: Cardiovascular - Adult  Goal: Maintains optimal cardiac output and hemodynamic stability  Outcome: Progressing  Flowsheets (Taken 7/19/2023 2020)  Maintains optimal cardiac output and hemodynamic stability:   Monitor blood pressure and heart rate   Monitor urine output and notify Licensed Independent Practitioner for values outside of normal range  Goal: Absence of cardiac dysrhythmias or at baseline  Outcome: Progressing  Flowsheets (Taken 7/19/2023 2020)  Absence of cardiac dysrhythmias or at baseline: Monitor cardiac rate and rhythm     Problem: Respiratory - Adult  Goal: Achieves optimal ventilation and oxygenation  Outcome: Progressing     Problem: Metabolic/Fluid and Electrolytes - Adult  Goal: Electrolytes maintained within normal limits  Outcome: Progressing  Flowsheets (Taken 7/19/2023 2200)  Electrolytes maintained within normal limits: Monitor labs and assess patient for signs and symptoms of electrolyte imbalances  Goal: Hemodynamic stability and optimal renal function maintained  Outcome:
Problem: Nutrition Deficit:  Goal: Optimize nutritional status  7/20/2023 1234 by Raj Juarez RN  Outcome: Progressing  Flowsheets (Taken 7/20/2023 1234)  Nutrient intake appropriate for improving, restoring, or maintaining nutritional needs:   Assess nutritional status and recommend course of action   Monitor oral intake, labs, and treatment plans  Note: Appetite good. Tolerating diet well. Problem: Pain  Goal: Verbalizes/displays adequate comfort level or baseline comfort level  7/20/2023 1234 by Raj Juarez RN  Outcome: Progressing  Flowsheets (Taken 7/20/2023 1234)  Verbalizes/displays adequate comfort level or baseline comfort level:   Assess pain using appropriate pain scale   Encourage patient to monitor pain and request assistance  Note: Patient denies pain. Will continue to monitor.     Problem: Safety - Adult  Goal: Free from fall injury  7/20/2023 1234 by Raj Juarez RN  Outcome: Progressing  Flowsheets (Taken 7/20/2023 1234)  Free From Fall Injury:   Instruct family/caregiver on patient safety   Based on caregiver fall risk screen, instruct family/caregiver to ask for assistance with transferring infant if caregiver noted to have fall risk factors
Problem: Safety - Adult  Goal: Free from fall injury  7/17/2023 0534 by Hernan Flores RN  Outcome: Progressing  Flowsheets (Taken 7/17/2023 0534)  Free From Fall Injury: Instruct family/caregiver on patient safety  Note: All standard safey precautions in place, pt free from injury. Problem: Cardiovascular - Adult  Goal: Maintains optimal cardiac output and hemodynamic stability  7/17/2023 0534 by Hernan Flores RN  Outcome: Progressing  Flowsheets (Taken 7/17/2023 0534)  Maintains optimal cardiac output and hemodynamic stability: Monitor blood pressure and heart rate  Note: VSS     Problem: Cardiovascular - Adult  Goal: Absence of cardiac dysrhythmias or at baseline  7/17/2023 0534 by Hernan Flores RN  Outcome: Progressing  Flowsheets (Taken 7/17/2023 0534)  Absence of cardiac dysrhythmias or at baseline:   Monitor cardiac rate and rhythm   Assess for signs of decreased cardiac output  Note: Hep gtt infusing, Q6 APPT drawn.
Problem: Safety - Adult  Goal: Free from fall injury  Outcome: Progressing  Flowsheets (Taken 7/16/2023 0423)  Free From Fall Injury: Instruct family/caregiver on patient safety  Note: All standard safety precautions in place, pt free from injury. Problem: Cardiovascular - Adult  Goal: Maintains optimal cardiac output and hemodynamic stability  Outcome: Progressing  Flowsheets (Taken 7/16/2023 0423)  Maintains optimal cardiac output and hemodynamic stability: Monitor blood pressure and heart rate  Note: VSS overnight. Heparin gtt infusing. Problem: Respiratory - Adult  Goal: Achieves optimal ventilation and oxygenation  Outcome: Progressing  Flowsheets (Taken 7/16/2023 0423)  Achieves optimal ventilation and oxygenation: Assess for changes in respiratory status  Note: Pt denies SOB this shift. O2 >94% room air. Problem: Pain  Goal: Verbalizes/displays adequate comfort level or baseline comfort level  Outcome: Progressing  Flowsheets (Taken 7/16/2023 0423)  Verbalizes/displays adequate comfort level or baseline comfort level:   Encourage patient to monitor pain and request assistance   Assess pain using appropriate pain scale  Note: Pt denies pain, no intervention at this time.
Problem: Safety - Adult  Goal: Free from fall injury  Outcome: Progressing  Flowsheets (Taken 7/16/2023 1952)  Free From Fall Injury:   Instruct family/caregiver on patient safety   Based on caregiver fall risk screen, instruct family/caregiver to ask for assistance with transferring infant if caregiver noted to have fall risk factors     Problem: Metabolic/Fluid and Electrolytes - Adult  Goal: Electrolytes maintained within normal limits  Outcome: Progressing  Flowsheets (Taken 7/16/2023 1952)  Electrolytes maintained within normal limits:   Administer electrolyte replacement as ordered   Monitor response to electrolyte replacements, including repeat lab results as appropriate   Monitor labs and assess patient for signs and symptoms of electrolyte imbalances  Note: Mg >2, K+ >4 per goal.      Problem: Pain  Goal: Verbalizes/displays adequate comfort level or baseline comfort level  Outcome: Progressing     Problem: Nutrition Deficit:  Goal: Optimize nutritional status  Outcome: Progressing
respiratory difficulty   Respiratory therapy support as indicated     Problem: Metabolic/Fluid and Electrolytes - Adult  Goal: Electrolytes maintained within normal limits  Outcome: Progressing  Flowsheets (Taken 7/23/2023 0315)  Electrolytes maintained within normal limits:   Monitor labs and assess patient for signs and symptoms of electrolyte imbalances   Administer electrolyte replacement as ordered   Monitor response to electrolyte replacements, including repeat lab results as appropriate   Fluid restriction as ordered     Problem: Metabolic/Fluid and Electrolytes - Adult  Goal: Hemodynamic stability and optimal renal function maintained  Outcome: Progressing  Flowsheets (Taken 7/23/2023 0315)  Hemodynamic stability and optimal renal function maintained:   Monitor labs and assess for signs and symptoms of volume excess or deficit   Monitor intake, output and patient weight   Monitor urine specific gravity, serum osmolarity and serum sodium as indicated or ordered     Problem: Pain  Goal: Verbalizes/displays adequate comfort level or baseline comfort level  Outcome: Progressing  Flowsheets (Taken 7/23/2023 0315)  Verbalizes/displays adequate comfort level or baseline comfort level:   Encourage patient to monitor pain and request assistance   Assess pain using appropriate pain scale   Administer analgesics based on type and severity of pain and evaluate response   Implement non-pharmacological measures as appropriate and evaluate response   Consider cultural and social influences on pain and pain management   Notify Licensed Independent Practitioner if interventions unsuccessful or patient reports new pain
stability:   Monitor blood pressure and heart rate   Monitor urine output and notify Licensed Independent Practitioner for values outside of normal range   Assess for signs of decreased cardiac output   Administer fluid and/or volume expanders as ordered   Administer vasoactive medications as ordered  7/23/2023 0315 by Eber Robbins RN  Outcome: Progressing  Flowsheets (Taken 7/23/2023 0315)  Maintains optimal cardiac output and hemodynamic stability:   Monitor blood pressure and heart rate   Monitor urine output and notify Licensed Independent Practitioner for values outside of normal range   Assess for signs of decreased cardiac output   Administer fluid and/or volume expanders as ordered   Administer vasoactive medications as ordered  Goal: Absence of cardiac dysrhythmias or at baseline  7/23/2023 1439 by Luz Maria Díaz RN  Outcome: Completed  7/23/2023 1258 by Luz Maria Díaz RN  Outcome: Progressing  7/23/2023 0315 by Eber Robbins RN  Outcome: Progressing  Flowsheets (Taken 7/23/2023 0315)  Absence of cardiac dysrhythmias or at baseline:   Monitor cardiac rate and rhythm   Assess for signs of decreased cardiac output     Problem: Respiratory - Adult  Goal: Achieves optimal ventilation and oxygenation  7/23/2023 1439 by Luz Maria Díaz RN  Outcome: Completed  7/23/2023 1258 by Luz Maria Díaz RN  Outcome: Progressing  [address] (Taken 7/23/2023 0315 by Eber Robbins RN)  Achieves optimal ventilation and oxygenation:   Assess for changes in respiratory status   Assess for changes in mentation and behavior   Position to facilitate oxygenation and minimize respiratory effort   Encourage broncho-pulmonary hygiene including cough, deep breathe, incentive spirometry   Assess the need for suctioning and aspirate as needed   Assess and instruct to report shortness of breath or any respiratory difficulty   Respiratory therapy support as indicated  Note: Pt on 2L  NC, Breathing treatments PRN  7/23/2023 0315 by
No bronchoconstriction on exam: on small doses of metoprolol. Monitor     May likely DC prednisone     Monitor on telemetry    14 day event monitor at discharge to eval Afib burden     Keep K > 4; Mg > 2     Check A1C, lipid panel     Will need imaging of abd/kidneys to eval hydronephrosis: US and/or CT. 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 Multiple issues. High risk for deterioration and need for higher level of care. Discussed care with pxt and nianalia Jesse Torrie whom he wants to be POA (plans to fill paperwork)      The patient and / or the family were informed of the results of any tests, a time was given to answer questions, a plan was proposed and they agreed with plan. Full Code      Disposition: Pending progress.  Likely in pxt into early next week  PTOT rosamaria Kern MD

## 2023-07-24 ENCOUNTER — APPOINTMENT (OUTPATIENT)
Dept: CT IMAGING | Age: 83
DRG: 091 | End: 2023-07-24
Attending: PHYSICAL MEDICINE & REHABILITATION
Payer: MEDICARE

## 2023-07-24 PROBLEM — R53.1 LEFT-SIDED WEAKNESS: Status: ACTIVE | Noted: 2023-07-24

## 2023-07-24 PROBLEM — N18.30 STAGE 3 CHRONIC KIDNEY DISEASE (HCC): Status: ACTIVE | Noted: 2023-07-24

## 2023-07-24 PROBLEM — R47.1 DYSARTHRIA: Status: ACTIVE | Noted: 2023-07-24

## 2023-07-24 PROBLEM — I10 HYPERTENSION: Status: ACTIVE | Noted: 2023-07-14

## 2023-07-24 LAB
ANION GAP SERPL CALCULATED.3IONS-SCNC: 8 MMOL/L (ref 3–16)
BASOPHILS # BLD: 0 K/UL (ref 0–0.2)
BASOPHILS NFR BLD: 0.6 %
BUN SERPL-MCNC: 35 MG/DL (ref 7–20)
CALCIUM SERPL-MCNC: 8.6 MG/DL (ref 8.3–10.6)
CHLORIDE SERPL-SCNC: 101 MMOL/L (ref 99–110)
CO2 SERPL-SCNC: 27 MMOL/L (ref 21–32)
CREAT SERPL-MCNC: 1.7 MG/DL (ref 0.8–1.3)
DEPRECATED RDW RBC AUTO: 16 % (ref 12.4–15.4)
EOSINOPHIL # BLD: 0.2 K/UL (ref 0–0.6)
EOSINOPHIL NFR BLD: 2 %
GFR SERPLBLD CREATININE-BSD FMLA CKD-EPI: 40 ML/MIN/{1.73_M2}
GLUCOSE SERPL-MCNC: 92 MG/DL (ref 70–99)
HCT VFR BLD AUTO: 21.7 % (ref 40.5–52.5)
HGB BLD-MCNC: 7.3 G/DL (ref 13.5–17.5)
LYMPHOCYTES # BLD: 1.1 K/UL (ref 1–5.1)
LYMPHOCYTES NFR BLD: 13.6 %
MCH RBC QN AUTO: 31.3 PG (ref 26–34)
MCHC RBC AUTO-ENTMCNC: 33.6 G/DL (ref 31–36)
MCV RBC AUTO: 92.9 FL (ref 80–100)
MONOCYTES # BLD: 0.9 K/UL (ref 0–1.3)
MONOCYTES NFR BLD: 11.1 %
NEUTROPHILS # BLD: 5.8 K/UL (ref 1.7–7.7)
NEUTROPHILS NFR BLD: 72.7 %
PLATELET # BLD AUTO: 221 K/UL (ref 135–450)
PMV BLD AUTO: 8.3 FL (ref 5–10.5)
POTASSIUM SERPL-SCNC: 4.4 MMOL/L (ref 3.5–5.1)
RBC # BLD AUTO: 2.34 M/UL (ref 4.2–5.9)
SODIUM SERPL-SCNC: 136 MMOL/L (ref 136–145)
WBC # BLD AUTO: 7.9 K/UL (ref 4–11)

## 2023-07-24 PROCEDURE — APPNB30 APP NON BILLABLE TIME 0-30 MINS: Performed by: NURSE PRACTITIONER

## 2023-07-24 PROCEDURE — 70450 CT HEAD/BRAIN W/O DYE: CPT

## 2023-07-24 PROCEDURE — 97162 PT EVAL MOD COMPLEX 30 MIN: CPT

## 2023-07-24 PROCEDURE — 2580000003 HC RX 258: Performed by: PHYSICAL MEDICINE & REHABILITATION

## 2023-07-24 PROCEDURE — 1280000000 HC REHAB R&B

## 2023-07-24 PROCEDURE — 97116 GAIT TRAINING THERAPY: CPT

## 2023-07-24 PROCEDURE — 80048 BASIC METABOLIC PNL TOTAL CA: CPT

## 2023-07-24 PROCEDURE — 36415 COLL VENOUS BLD VENIPUNCTURE: CPT

## 2023-07-24 PROCEDURE — 97535 SELF CARE MNGMENT TRAINING: CPT

## 2023-07-24 PROCEDURE — 85025 COMPLETE CBC W/AUTO DIFF WBC: CPT

## 2023-07-24 PROCEDURE — 99232 SBSQ HOSP IP/OBS MODERATE 35: CPT | Performed by: INTERNAL MEDICINE

## 2023-07-24 PROCEDURE — 99223 1ST HOSP IP/OBS HIGH 75: CPT | Performed by: PSYCHIATRY & NEUROLOGY

## 2023-07-24 PROCEDURE — 97166 OT EVAL MOD COMPLEX 45 MIN: CPT

## 2023-07-24 PROCEDURE — 6370000000 HC RX 637 (ALT 250 FOR IP): Performed by: PHYSICAL MEDICINE & REHABILITATION

## 2023-07-24 PROCEDURE — 97530 THERAPEUTIC ACTIVITIES: CPT

## 2023-07-24 RX ORDER — FERROUS SULFATE 325(65) MG
325 TABLET ORAL
Status: DISCONTINUED | OUTPATIENT
Start: 2023-07-24 | End: 2023-08-04 | Stop reason: HOSPADM

## 2023-07-24 RX ADMIN — FERROUS SULFATE TAB 325 MG (65 MG ELEMENTAL FE) 325 MG: 325 (65 FE) TAB at 10:06

## 2023-07-24 RX ADMIN — BISACODYL 5 MG: 5 TABLET, COATED ORAL at 10:07

## 2023-07-24 RX ADMIN — PANTOPRAZOLE SODIUM 40 MG: 40 TABLET, DELAYED RELEASE ORAL at 16:00

## 2023-07-24 RX ADMIN — ATORVASTATIN CALCIUM 40 MG: 40 TABLET, FILM COATED ORAL at 21:32

## 2023-07-24 RX ADMIN — SODIUM CHLORIDE, PRESERVATIVE FREE 10 ML: 5 INJECTION INTRAVENOUS at 10:13

## 2023-07-24 RX ADMIN — NYSTATIN: 100000 CREAM TOPICAL at 10:08

## 2023-07-24 RX ADMIN — METOPROLOL SUCCINATE 25 MG: 25 TABLET, EXTENDED RELEASE ORAL at 10:07

## 2023-07-24 RX ADMIN — ASPIRIN 81 MG: 81 TABLET, CHEWABLE ORAL at 10:06

## 2023-07-24 RX ADMIN — TAMSULOSIN HYDROCHLORIDE 0.4 MG: 0.4 CAPSULE ORAL at 21:32

## 2023-07-24 RX ADMIN — FUROSEMIDE 20 MG: 20 TABLET ORAL at 10:07

## 2023-07-24 RX ADMIN — NYSTATIN: 100000 CREAM TOPICAL at 21:32

## 2023-07-24 RX ADMIN — PANTOPRAZOLE SODIUM 40 MG: 40 TABLET, DELAYED RELEASE ORAL at 06:40

## 2023-07-24 RX ADMIN — SODIUM CHLORIDE, PRESERVATIVE FREE 10 ML: 5 INJECTION INTRAVENOUS at 21:32

## 2023-07-24 ASSESSMENT — PAIN SCALES - GENERAL
PAINLEVEL_OUTOF10: 0
PAINLEVEL_OUTOF10: 0

## 2023-07-24 NOTE — PROGRESS NOTES
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 ADL  Feeding: Setup  Feeding Skilled Clinical Factors: Assistance needed to cut up pt's pork since pt was unable to stabilize fork and knife in LUE. UE Bathing: Setup;Verbal cueing; Increased time to complete;Minimal assistance  UE Bathing Skilled Clinical Factors: Pt unable to maintain grasp of wash cloth in L hand. Pt required assistance to thoroughly wash RUE. Assistance needed to put soap on the wash cloth  LE Bathing: Setup;Verbal cueing; Increased time to complete;Minimal assistance  LE Bathing Skilled Clinical Factors: Pt washed BLEs and joi area seated on TTB. Pt able to reach BLEs by leaning anteriorly. Pt w/ one major LOB when he crossed his legs resulting in max A to recover L lateral LOB. Pt unsafe to  the shower but required min A to dry buttocks after shower. UE Dressing: Setup;Verbal cueing; Increased time to complete; Moderate assistance  UE Dressing Skilled Clinical Factors: Assistance needed to thread LUE into sleeve and pull shirt overhead  LE Dressing: Setup;Verbal cueing; Increased time to complete;Maximum assistance  LE Dressing Skilled Clinical Factors: Pt threaded RLE into underwear but required assistance to thread LLE and pull clothing up over hips. Toileting: Moderate assistance  Toileting Skilled Clinical Factors: Pt required assistance to pull clothing down.  Pt continent of urine    Goals  Patient Goals   Patient goals : get stronger  Short Term Goals  Time Frame for Short Term Goals: 2 weeks  Short Term Goal 1: Pt will complete LE dressing MOD I  Short Term Goal 2: Pt will complete toileting and toilet transfers MOD I  Short Term Goal 3: Pt will complete bathing tasks w/ spvn and use of TTB for fall prevention  Short Term Goal 4: Pt will complete tub transfer w/ spvn  Short Term Goal 5: Pt will be independent w/ LUE HEP to increase independence w/ ADLs    Assessment    Performance deficits / Impairments: Decreased functional mobility ; Decreased ADL status; Decreased endurance;Decreased strength;Decreased high-level IADLs;Decreased balance;Decreased coordination;Decreased ROM; Decreased safe awareness;Decreased fine motor control;Decreased posture  Assessment: Pt is an 79 yo male who presents to [facility] 2/2 COPD and CHF. Prior to admission pt reported he was independent w/ all ADLs and functional mobility w/o use of an AD. Pt is currently limited by decreased activity tolerance as well as LUE and LLE weakness which pt reported started 3 days ago while in the hospital. Pt is functioning well below his baseline and is requiring min-mod A for safe functional transfers and mobility and up to max A for ADLs due to LUE weakness and balance impairment. Pt benefits from OT to maximize functional independence. Treatment Diagnosis: Impaired ADLs, mobility, balance 2/2 CHF  Prognosis: Good  Decision Making: Medium Complexity  Discharge Recommendations: Home with Home health OT;24 hour supervision or assist;Continue to assess pending progress    Occupational Therapy Plan  Times Per Week: 5x a week for 60 mins daily  Current Treatment Recommendations: Balance training;Self-Care / ADL; Safety education & training;Functional mobility training;Strengthening; Endurance training;Patient/Caregiver education & training;ROM; Coordination training;Home management training; Wheelchair mobility training;Gait training;Stair training;Neuromuscular

## 2023-07-24 NOTE — PLAN OF CARE
Problem: Discharge Planning  Goal: Discharge to home or other facility with appropriate resources  Recent Flowsheet Documentation  Taken 7/24/2023 0925 by Cheryl Dias RN  Discharge to home or other facility with appropriate resources: Identify barriers to discharge with patient and caregiver     Problem: Safety - Adult  Goal: Free from fall injury  7/24/2023 1239 by Cheryl Dias RN  Outcome: Progressing     Problem: Skin/Tissue Integrity  Goal: Absence of new skin breakdown  Description: 1. Monitor for areas of redness and/or skin breakdown  2. Assess vascular access sites hourly  3. Every 4-6 hours minimum:  Change oxygen saturation probe site  4. Every 4-6 hours:  If on nasal continuous positive airway pressure, respiratory therapy assess nares and determine need for appliance change or resting period. 7/24/2023 1239 by Cheryl Dias RN  Outcome: Progressing  Note: Patient continues with protective dressing to coccyx area. Turning & repositioning every 2 hours recommended to patient, patient able to reposition self while in bed.      Problem: ABCDS Injury Assessment  Goal: Absence of physical injury  Outcome: Progressing  Note: Patient remains free of injury since admission     Problem: Chronic Conditions and Co-morbidities  Goal: Patient's chronic conditions and co-morbidity symptoms are monitored and maintained or improved  Outcome: Progressing  Flowsheets (Taken 7/24/2023 0925)  Care Plan - Patient's Chronic Conditions and Co-Morbidity Symptoms are Monitored and Maintained or Improved: Monitor and assess patient's chronic conditions and comorbid symptoms for stability, deterioration, or improvement

## 2023-07-24 NOTE — PROGRESS NOTES
Patient has been resting quietly in bed watching TV. He is alert and oriented. Vital signs stable. He denies dyspnea or pain. Breath sound diminished. Plan of care reviewed with patient. Instructed to call with any needs. Call light in reach.

## 2023-07-24 NOTE — PROGRESS NOTES
Patient alert and oriented x 4, denies pain/discomfort. Patient c/o weakness to left hand. Shift assessment completed, VSS. Patient denies numbness or tingling, but states left hand is very weak. Decreased grasp noted to left arm, able to move left arm, but unable to move left hand/wrist. Patient niece expressed to this nurse that patient speech has noted changed from last 2 days. Dr. Ron Hernandez made aware and CT without contrast ordered for today. Radiology notified this nurse CT was ready for patient. Awaiting transport for patient to go to CT. Niece and patient aware of orders. Patient up in recliner, Call light and personal items within reach, safety measures in place.

## 2023-07-24 NOTE — PLAN OF CARE
Problem: Discharge Planning  Goal: Discharge to home or other facility with appropriate resources  7/24/2023 0621 by Consuelo Maradiaga RN  Outcome: Progressing  Flowsheets (Taken 7/24/2023 1850)  Discharge to home or other facility with appropriate resources:   Identify barriers to discharge with patient and caregiver   Identify discharge learning needs (meds, wound care, etc)     Problem: Safety - Adult  Goal: Free from fall injury  7/24/2023 0621 by Consuelo Maradiaga RN  Outcome: Progressing  Note: Fall precautions in place. Bed is in low and locked position. Bed alarm set. Call light is within reach. Patient has called appropriately for assistance with needs.          Problem: Skin/Tissue Integrity  Goal: Absence of new skin breakdown  7/24/2023 0621 by Consuelo Maradiaga RN  Outcome: Progressing room air

## 2023-07-24 NOTE — PLAN OF CARE
Problem: Discharge Planning  Goal: Discharge to home or other facility with appropriate resources  7/24/2023 0546 by Carolina Mak RN  Outcome: Progressing  Flowsheets (Taken 7/24/2023 7962)  Discharge to home or other facility with appropriate resources:   Identify barriers to discharge with patient and caregiver   Identify discharge learning needs (meds, wound care, etc)     Problem: Safety - Adult  Goal: Free from fall injury  7/24/2023 0546 by Carolina Mak RN  Outcome: Progressing  Note: Patient is alert and oriented. Safety precautions in place. Bed is in low and locked position. Be alarm set. Call light in reach. [employer] camera in use     Problem: Skin/Tissue Integrity  Goal: Absence of new skin breakdown  7/24/2023 0546 by Carolina Mak RN  Outcome: Progressing  Note: Scattered bruising. Blanchable redness noted on buttock, groin and joi area. Skin cleansed and Nystatin cream applied. Cuticle surrounding toes reddened.

## 2023-07-24 NOTE — CONSULTS
NEUROLOGY / NEUROCRITICAL CARE CONSULT NOTE       Josue Hernández DO is requesting this consult. Reason for Consult: L sided weakness  Admission Chief Complaint: in ARU after deconditioning from hospital stay    History of Present Illness     Axel Blankenship is a 80 y.o. y/o male with COPD, CHF, new onset afib recent hospitalization for new onset afib and likely COPD / CHF exacerbations. He was initially started on eliquis for his afib which was discontinued during hospitalization d/t GI bleeding. He was transferred to inpatient rehab unit. He states about 3 days ago (prior to transfer to ARU) his arm felt less coordinated and has felt that way since, subjectively weaker on L. Has slurred speech. This was noted by ARU staff and patient was sent down for head Ct. Due to duration of symptoms and hx of GI bleed he would not have been a candidate for any stroke intervention, NIHSS of 3 on exam today       Data obtained from Independent Historian(s):  Patient - he seems like a good historian. He is hard of hearing though. EMR        Review of data from external sources including:  None    REVIEW OF SYSTEMS:   C/o L arm clumsiness     Past Medical, Surgical, Family, and Social History   PAST MEDICAL HISTORY:  Past Medical History:   Diagnosis Date    CHF (congestive heart failure) ([address])     Smoker      SURGICAL HISTORY:  Past Surgical History:   Procedure Laterality Date    UPPER GASTROINTESTINAL ENDOSCOPY N/A 7/19/2023    EGD BIOPSY performed by Camelia Quintana MD at [facility]:  Family history non-contributory  No family history on file.   Social History     Tobacco Use    Smoking status: Every Day     Packs/day: 1.00     Types: Cigarettes    Smokeless tobacco: Never   Substance Use Topics    Alcohol use: Not Currently    Drug use: Not Currently          Allergies & Outpatient Medications   ALLERGIES:  No Known Allergies  HOME MEDICATIONS:  Current Discharge Medication List

## 2023-07-24 NOTE — PROGRESS NOTES
Nephrology Progress Note                                                                                                                                                                                                                                                                                                                                                               Office : [phone]     Fax :[phone]              Patient's Name: Raynelle Meigs    7/24/2023      Assessment/Plan     1 MICHAEL/CKD   - Possibly obstructive, pre-renal due to possible CHF exacerbation    2. HTN    3. Acute anemia  - GI consulted for GIB  - S/p EGD with duodenal ulcers    4. Acid- base/ Electrolyte imbalance     5. COPD exacerbation     6. Bilateral pleural effusions     7. HFrEF   - EF 15-20%, grade 2 DD, moderate MR, pulmonary HTN    8. Atrial flutter     9. Urinary retention  - S/p complex thomson cath placement 7/19  - Flomax initiated     Plan:  - Cr stable  - Coronary angiogram cancelled d/t GIB - to be done outpatient  - PO Lasix resumed   - Cards following for low EF w/u   - Presented with Cr 1.6 presentation, BNP 86974----> 6000  - Urine studies - reviewed   - Monitor UOP      Recommend to dose adjust all medications  based on renal functions  Maintain SBP> 90 mmHg   Daily weights   AVOID NSAIDs  Avoid Nephrotoxins  Monitor Intake/Output  Call if significant decrease in urine output       Reason for Consult:  MICHAEL/CKD   Requesting Physician:  No primary care provider on file. Chief Complaint:  Dyspnea    Interval hx:    Sitting up in a chair   Able to relay that a head CT was ordered   Neuro consulted for LUE weakness     Cr stable  BP stable     reports that he has been smoking cigarettes. He has been smoking an average of 1 pack per day. He has never used smokeless tobacco. He reports that he does not currently use alcohol. He reports that he does not currently use drugs.     Allergies:  Patient has no known

## 2023-07-24 NOTE — H&P
Department of Physical Medicine & Rehabilitation  History & Physical      Patient Identification:  Axel Blankenship  : 1940  Admit date: 2023   Attending provider: Antony Higginbotham DO        Primary care provider: No primary care provider on file. Chief Complaint: HF    History of Present Illness/Hospital Course:  Aletha Duncan is a 80 y.o. male with pmh of smoking who presents with Chronic systolic (congestive) heart failure ([address] He currently is very weak with decreased conditioning. He is agreeable to ARU PT/OT to improve function before discharge home. Prior Level of Function:  Independent for mobility, ADLs, and IADLs    Current Level of Function:  Mod assist         Past Medical History:   Diagnosis Date    Smoker      Fall in past year: yes    Past Surgical History:   Procedure Laterality Date    UPPER GASTROINTESTINAL ENDOSCOPY N/A 2023    EGD BIOPSY performed by Yolanda Singh MD at [address] [place] [address] in past 100 days: No    No family history on file. Social History     Socioeconomic History    Marital status:     Tobacco Use    Smoking status: Every Day     Packs/day: 1.00     Types: Cigarettes    Smokeless tobacco: Never   Substance and Sexual Activity    Alcohol use: Not Currently    Drug use: Not Currently       No Known Allergies      Current Facility-Administered Medications   Medication Dose Route Frequency Provider Last Rate Last Admin    aspirin chewable tablet 81 mg  81 mg Oral Daily Trino L Heis, DO        atorvastatin (LIPITOR) tablet 40 mg  40 mg Oral Nightly Trino KAMALA Lamaris, DO   40 mg at 23    furosemide (LASIX) tablet 20 mg  20 mg Oral Daily Trino KAMALA Heis, DO        ipratropium 0.5 mg-albuterol 2.5 mg (DUONEB) nebulizer solution 1 Dose  1 Dose Inhalation Q4H PRN Trino L Heis, DO        magnesium sulfate 2000 mg in 50 mL IVPB premix  2,000 mg IntraVENous PRN Trino KAMALA Heis, DO        metoprolol succinate (TOPROL XL) extended -On p.o. Protonix 40 mg twice daily. - iron supplement     MICHAEL on CKD   -nephrology consult         pAfib   -Metoprolol succinate 25 mg daily         Urinary retention   L Hydronephrosis   - monitor output- re- eval if needed thomson or voiding trial on ARU     Mild emphysema on CT   -will need PFTs OP      Tobacco use disorder   -Former smoker, stopped within 5 days PTA   -at least 40 PPD smoking history     Malnutrition, severe   -Cachectic with BMI 18  -RD following     Impairments: Decreased functional mobility, Decreased ADLs    Bladder - high risk retention - Monitor PVRs, SC prn >300cc    Bowel - high risk constipation - colace BID, PRN miralax and MoM. follow bowel movements. Enema or suppository if needed.      Safety - fall precautions    PPx  DVT: SCD  GI: pantoprazole    FULL CODE    Mayela Trevizo D.O. M.P.H  PM&R  7/24/2023  9:08 AM

## 2023-07-24 NOTE — CARE COORDINATION
Chart reviewed. Pt was off the floor throughout the day for testing. Neuro cons for poss stroke. Pt is from home alone and is indp at baseline. Pt has a RW at home. Pt has family support from his niece and brother. SW will meet w/Pt tomorrow for full assessment. DCP -still being assessed. DC Date: TBD    [facility] following.   Electronically signed by IRINEO Avendano, JD on 7/24/2023 at 4:41 PM  [phone]

## 2023-07-24 NOTE — DISCHARGE INSTRUCTIONS
Extra Heart Failure sites:     https://[employer]. com/publication/?e=594239   --- this is American Heart Association interactive Healthier Living with Heart Failure guidebook. Please click hyperlink or copy / paste link into search bar. Use your mouse to scroll through the pages. Lots of information about weight monitoring, diet tips, activity, meds, etc    HF [place] didi  -- this is a free smart phone didi available for iPhone and Android download. Use your phone to track sodium / fluid intake, zone tool symptom tracking, weights, medications, etc. Click on this hyperlink  HF [place] Didi   for QR code for easy download. DASH (Dietary Approach to Stop Hypertension) diet --  [employer].no -- this diet is a flexible eating plan that promotes heart healthy eating style. Click on hyperlink or copy / paste link into search bar. Lots of low sodium recipes and tips.     [employer].ca  -- more free recipes

## 2023-07-24 NOTE — PATIENT CARE CONFERENCE
Inpatient Rehabilitation  Weekly Team Conference Note  The [address] [place] [address]  [phone]  Patient Name: Aletha Duncan        MRN: [id number]    : 1940  (80 y.o.)  Gender: male   Referring Practitioner: Carolina Hernández DO  Diagnosis: Debility  The team conference for this patient was held on 2023 at 10:30am by:  Carolina Hernández DO    Current/Goal QM SCORES  QM Current/Goal Score   Eating CARE Score: 5 / Discharge Goal: Independent   Oral Hygiene CARE Score: 7 / Discharge Goal: Independent   Shower/Bathing CARE Score: 3 / Discharge Goal: Supervision or touching assistance   UB Dressing CARE Score: 3 / Discharge Goal: Independent   LB Dressing CARE Score: 2 / Discharge Goal: Independent   Putting on/off Footwear CARE Score: 1 / Discharge Goal: Independent   Toileting Hygiene CARE Score: 3 / Discharge Goal: Independent   Bladder Continence      Bowel Continence      Toilet Transfers CARE Score: 3 / Discharge Goal: Independent   Shower/Bathe Self  CARE Score: 3 / Discharge Goal: Supervision or touching assistance   Rolling Left and Right CARE Score: 4 / Discharge Goal: Independent   Sit to Lying CARE Score: 3 / Discharge Goal: Independent   Lying to Sitting on Bedside CARE Score: 3 / Discharge Goal: Independent   Sit to Stand CARE Score: 3 / Discharge Goal: Independent   Chair/Bed to Chair Transfer CARE Score: 3 / Discharge Goal: Independent   Car Transfers CARE Score: 3 / Discharge Goal: Independent   Walk 10 Feet CARE Score: 3 / Discharge Goal: Independent   Walk 50 Feet with Two Turns CARE Score: 3 / Discharge Goal: Independent   Walk 150 Feet CARE Score: 3 / Discharge Goal: Independent   Walk 10 Feet on Uneven Surfaces CARE Score: 3 / Discharge Goal: Independent   1 Step (Curb) CARE Score: 3 / Discharge Goal: Independent   4 Steps CARE Score: 3 / Discharge Goal: Independent   12 Steps CARE Score: 3 / Discharge Goal: Independent   Picking up

## 2023-07-24 NOTE — PROGRESS NOTES
Physical Therapy  Facility/Department: [facility] ACUTE REHAB UNIT  Rehabilitation Physical Therapy Initial Assessment    NAME: Nat Blakely  : 1940 (80 y.o.)  MRN: [id number]  CODE STATUS: Full Code    Date of Service: 23      Past Medical History:   Diagnosis Date    CHF (congestive heart failure) ([address])     Smoker      Past Surgical History:   Procedure Laterality Date    UPPER GASTROINTESTINAL ENDOSCOPY N/A 2023    EGD BIOPSY performed [address] Amita Dee MD [address]       Chart Reviewed: Yes  Patient assessed for rehabilitation services?: Yes  Additional Pertinent Hx: Pt is an 79 yo male from home that presents to the ED with co of worsening shortness of breath over the past week. Upon arrival he was found to have CHF exacerbation and undiagnosed COPD. Patient independent at baseline, has not been to the doctor in many years. Referring Practitioner: Ryan Hernández DO  Diagnosis: Debility    Restrictions:  Restrictions/Precautions: Fall Risk  Position Activity Restriction  Other position/activity restrictions: up with assist, ambulate the patient     SUBJECTIVE  Subjective: pt found on the RTS with nursing, agreeable to therapy. Reports no pain, but weakness in the left arm/hand.       Prior Level of Function:  Social/Functional History  Lives With: Alone  Type of Home: House  Home Layout: Two level, Able to Live on Main level with bedroom/bathroom  Home Access: Stairs to enter without rails  Entrance Stairs - Number of Steps: 1  Bathroom Shower/Tub: Tub/Shower unit  Bathroom Toilet: Standard  Home Equipment: Comfort Ducking, rolling, Cane  Has the patient had two or more falls in the past year or any fall with injury in the past year?: No  ADL Assistance: Independent  Homemaking Assistance: Independent  Ambulation Assistance: Independent  Transfer Assistance: Independent  Active : Yes  Occupation: Retired  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

## 2023-07-24 NOTE — PROGRESS NOTES
Asked patient to have Adonis Matos [facility]) to bring in home medication list to verify home medications and reconcile.

## 2023-07-24 NOTE — PROGRESS NOTES
Dr. Marino Cordova notified of results of head CT. Order placed for Neurology consult. Neurology contacted regarding consult, informed they will review case.

## 2023-07-25 ENCOUNTER — APPOINTMENT (OUTPATIENT)
Dept: GENERAL RADIOLOGY | Age: 83
DRG: 091 | End: 2023-07-25
Attending: PHYSICAL MEDICINE & REHABILITATION
Payer: MEDICARE

## 2023-07-25 ENCOUNTER — APPOINTMENT (OUTPATIENT)
Dept: MRI IMAGING | Age: 83
DRG: 091 | End: 2023-07-25
Attending: PHYSICAL MEDICINE & REHABILITATION
Payer: MEDICARE

## 2023-07-25 PROCEDURE — 97129 THER IVNTJ 1ST 15 MIN: CPT

## 2023-07-25 PROCEDURE — 99232 SBSQ HOSP IP/OBS MODERATE 35: CPT | Performed by: INTERNAL MEDICINE

## 2023-07-25 PROCEDURE — 97110 THERAPEUTIC EXERCISES: CPT

## 2023-07-25 PROCEDURE — 97116 GAIT TRAINING THERAPY: CPT

## 2023-07-25 PROCEDURE — 74230 X-RAY XM SWLNG FUNCJ C+: CPT

## 2023-07-25 PROCEDURE — 6370000000 HC RX 637 (ALT 250 FOR IP): Performed by: PHYSICAL MEDICINE & REHABILITATION

## 2023-07-25 PROCEDURE — 70551 MRI BRAIN STEM W/O DYE: CPT

## 2023-07-25 PROCEDURE — 97530 THERAPEUTIC ACTIVITIES: CPT

## 2023-07-25 PROCEDURE — 2580000003 HC RX 258: Performed by: PHYSICAL MEDICINE & REHABILITATION

## 2023-07-25 PROCEDURE — 97535 SELF CARE MNGMENT TRAINING: CPT

## 2023-07-25 PROCEDURE — 1280000000 HC REHAB R&B

## 2023-07-25 PROCEDURE — 92526 ORAL FUNCTION THERAPY: CPT

## 2023-07-25 PROCEDURE — 92523 SPEECH SOUND LANG COMPREHEN: CPT

## 2023-07-25 PROCEDURE — 92611 MOTION FLUOROSCOPY/SWALLOW: CPT

## 2023-07-25 RX ADMIN — NYSTATIN: 100000 CREAM TOPICAL at 10:00

## 2023-07-25 RX ADMIN — ATORVASTATIN CALCIUM 40 MG: 40 TABLET, FILM COATED ORAL at 20:47

## 2023-07-25 RX ADMIN — METOPROLOL SUCCINATE 25 MG: 25 TABLET, EXTENDED RELEASE ORAL at 10:01

## 2023-07-25 RX ADMIN — NYSTATIN: 100000 CREAM TOPICAL at 20:48

## 2023-07-25 RX ADMIN — ASPIRIN 81 MG: 81 TABLET, CHEWABLE ORAL at 10:00

## 2023-07-25 RX ADMIN — FUROSEMIDE 20 MG: 20 TABLET ORAL at 10:01

## 2023-07-25 RX ADMIN — PANTOPRAZOLE SODIUM 40 MG: 40 TABLET, DELAYED RELEASE ORAL at 06:52

## 2023-07-25 RX ADMIN — SODIUM CHLORIDE, PRESERVATIVE FREE 10 ML: 5 INJECTION INTRAVENOUS at 12:18

## 2023-07-25 RX ADMIN — BISACODYL 5 MG: 5 TABLET, COATED ORAL at 10:01

## 2023-07-25 RX ADMIN — PANTOPRAZOLE SODIUM 40 MG: 40 TABLET, DELAYED RELEASE ORAL at 16:26

## 2023-07-25 RX ADMIN — FERROUS SULFATE TAB 325 MG (65 MG ELEMENTAL FE) 325 MG: 325 (65 FE) TAB at 10:00

## 2023-07-25 RX ADMIN — TAMSULOSIN HYDROCHLORIDE 0.4 MG: 0.4 CAPSULE ORAL at 20:47

## 2023-07-25 ASSESSMENT — PAIN SCALES - GENERAL
PAINLEVEL_OUTOF10: 0

## 2023-07-25 ASSESSMENT — PAIN SCALES - WONG BAKER
WONGBAKER_NUMERICALRESPONSE: 0

## 2023-07-25 NOTE — PROGRESS NOTES
Nephrology Progress Note                                                                                                                                                                                                                                                                                                                                                               Office : [phone]     Fax :[phone]              Patient's Name: Gerardo Marinelli    7/25/2023      Assessment/Plan     1 MICHAEL/CKD   - Possibly obstructive, pre-renal due to possible CHF exacerbation    2. HTN    3. Acute anemia  - GI consulted for GIB  - S/p EGD with duodenal ulcers    4. Acid- base/ Electrolyte imbalance     5. COPD exacerbation     6. Bilateral pleural effusions     7. HFrEF   - EF 15-20%, grade 2 DD, moderate MR, pulmonary HTN    8. Atrial flutter     9. Urinary retention  - S/p complex thomson cath placement 7/19  - Flomax initiated     Plan:  - No updated labs today   - Cr stable  - Coronary angiogram cancelled d/t GIB - to be done outpatient  - PO Lasix resumed   - Cards following for low EF w/u   - Presented with Cr 1.6 presentation, BNP 28323----> 6000  - Urine studies - reviewed   - Monitor UOP      Recommend to dose adjust all medications  based on renal functions  Maintain SBP> 90 mmHg   Daily weights   AVOID NSAIDs  Avoid Nephrotoxins  Monitor Intake/Output  Call if significant decrease in urine output       Reason for Consult:  MICHAEL/CKD   Requesting Physician:  No primary care provider on file. Chief Complaint:  Dyspnea    Interval hx:    Back in bed, returned from MRI  MRI brain pending  No updated labs today    BP's stable  Good UOP     reports that he has been smoking cigarettes. He has been smoking an average of 1 pack per day. He has never used smokeless tobacco. He reports that he does not currently use alcohol. He reports that he does not currently use drugs.     Allergies:  Patient has no known

## 2023-07-25 NOTE — CONSULTS
Comprehensive Nutrition Assessment    RECOMMENDATIONS:  PO Diet: Regular, cardiac ; continue   ONS: Ensure +HP qd ; continue   Nutrition Education: Education not indicated     NUTRITION ASSESSMENT:   Nutritional summary & status: Consult r/t new admit to ARU. MST 0; no significant wt loss noted per EMR hx. Pt is on a regular, cardiac diet w/ PO intake ave % most meals since admit to ARU. Pt is receiving Ensure +HP qd per MD order & pt receptive to supplement. Will continue to send qd d/t current PO intakes sufficient in meeting EEN To modify ons prn pending PO intake thru admit. Labs reviewed. No addt interventions indicated for now; Will continue to monitor pt per [facility]; RD following. Admission/PMH: dyspnea, new onset afib and aflutter // heart failure, cardiomyopathy, MICHAEL, HTN    MALNUTRITION ASSESSMENT  Context of Malnutrition: Acute Illness   Malnutrition Status: Severe malnutrition  Findings of the 6 clinical characteristics of malnutrition (Minimum of 2 out of 6 clinical characteristics is required to make the diagnosis of moderate or severe Protein Calorie Malnutrition based on AND/ASPEN Guidelines):  Energy Intake:  No significant decrease in energy intake  Weight Loss:  No significant weight loss     Body Fat Loss: Moderate body fat loss Triceps, Buccal region   Muscle Mass Loss: Moderate muscle mass loss Temples (temporalis), Clavicles (pectoralis & deltoids)    NUTRITION DIAGNOSIS   Increased nutrient needs related to increase demand for energy/nutrients as evidenced by other (comment) (extended hospital stay aeb ARU admit)    Nutrition Monitoring and Evaluation:   Food/Nutrient Intake Outcomes:  Food and Nutrient Intake, Supplement Intake  Physical Signs/Symptoms Outcomes:  Biochemical Data, Nutrition Focused Physical Findings, Weight, Hemodynamic Status     OBJECTIVE DATA: Significant to nutrition assessment  Nutrition Related Findings: +BM 7/23. No edema.  BUN 35, Cr 1.7, GFR 40  Wounds: None  Nutrition Goals: PO intake 75% or greater, prior to discharge     [facility]; Lunch; Standard High Calorie/High Protein Oral Supplement  ADULT DIET; Regular; Low Fat/Low Chol/High Fiber/2 gm Na  PO Intake: %   PO Supplement Intake:%  Additional Sources of Calories/IVF:N/A     COMPARATIVE STANDARDS  Energy (kcal):  8721-0554 (30-35 kcal/kg CBW)     Protein (g):   (1.5-1.8 g/kg CBW)       Fluid (ml/day):  or per MD    ANTHROPOMETRICS  Current Height: 6' (182.9 cm)  Current Weight - Scale: 136 lb 14.5 oz (62.1 kg)    Admission weight: 135 lb 4.8 oz (61.4 kg)    The patient will be monitored per nutrition standards of care. Consult dietitian if additional nutrition interventions are needed prior to RD reassessment.      Saul Mcpherson, [address]:  [phone]  Office:  [phone]

## 2023-07-25 NOTE — PROCEDURES
INSTRUMENTAL SWALLOW REPORT  MODIFIED BARIUM SWALLOW    NAME: Camden Garnett   : 1940  MRN: [id number]       Date of Eval: 2023     Ordering Physician: Dr. Heladio Perry  Radiologist: Dr. Meseret Sanchez     Referring Diagnosis(es): Referring Diagnosis: dysphagia    Past Medical History:  has a past medical history of CHF (congestive heart failure) ([address]) and Smoker. Past Surgical History:  has a past surgical history that includes Upper gastrointestinal endoscopy (N/A, 2023). Date of Prior Study: N/A  Type of Study: Initial MBS  Results of Prior Study: N/A    Recent CXR/CT of Chest: 23  IMPRESSION:  Pulmonary opacities most consistent with mild-to-moderate pulmonary edema and  small bilateral pleural effusions. Calcified granulomas in the right upper lung however there is also a 6 mm  nodule which appears to have some central calcification and likely represents  granuloma. Finding likely represents granuloma in follow-up x-ray could be  performed to document stability however if patient is high risk for  malignancy, CT could be performed for further evaluation. Patient Complaints/Reason for Referral:  Camden Garnett was referred for a MBS to assess the efficiency of his/her swallow function, assess for aspiration, and to make recommendations regarding safe dietary consistencies, effective compensatory strategies, and safe eating environment. Patient complaints: None stated    Onset of problem:   Date of Onset: hopsital admission on ; s/s aspiration on        Subjective  Subjective: Pt transported to and from radiology via wheelchair by SLP. Behavior/Cognition/Vision/Hearing:  Behavior/Cognition: Alert; Cooperative    Impressions:  Treatment Dx and ICD 10: oropharyngeal phase dysphagia   Patient Position: Lateral and upright     Consistencies Administered: Regular;Pureed; Thin cup; Thin teaspoon;Mildly Thick cup;Mildly Thick teaspoon    Oropharyngeal Phase Impressions: Pt presents

## 2023-07-25 NOTE — PROGRESS NOTES
Patient up for therapy this am, then to MRI per transport via stretcher. Patient returned to unit without complication. Shift assessment completed with VSS. Patient resting before next therapy session. Call light and personal items within reach, safety measures in place.

## 2023-07-25 NOTE — PLAN OF CARE
Reviewed MRI brain with attending neurologist  OK to resume anticoagulation from a neurologic perspective tomorrow, 7/26.   Will not need aspirin from neurologic perspective  Will need to (of course) have this cleared with GI due to recent GI bleeding    Neurology will sign off  Call with questions, concerns, or change in exam    Mindi Hodgkin NP

## 2023-07-25 NOTE — PLAN OF CARE
Problem: Discharge Planning  Goal: Discharge to home or other facility with appropriate resources  Outcome: Progressing  Flowsheets (Taken 7/25/2023 0050)  Discharge to home or other facility with appropriate resources:   Identify barriers to discharge with patient and caregiver   Identify discharge learning needs (meds, wound care, etc)     Problem: Safety - Adult  Goal: Free from fall injury  7/25/2023 0050 by Bisi Correa RN  Flowsheets (Taken 7/25/2023 0050)  Free From Fall Injury: Instruct family/caregiver on patient safety     Problem: Skin/Tissue Integrity  Goal: Absence of new skin breakdown  Description: 1.   Monitor for areas of redness and/or skin breakdown  7/25/2023 0050 by Bisi Correa RN  Outcome: Progressing     Problem: ABCDS Injury Assessment  Goal: Absence of physical injury  7/25/2023 0050 by Bisi Correa RN  Outcome: Progressing  Flowsheets (Taken 7/25/2023 0050)  Absence of Physical Injury: Implement safety measures based on patient assessment     Problem: Chronic Conditions and Co-morbidities  Goal: Patient's chronic conditions and co-morbidity symptoms are monitored and maintained or improved  7/25/2023 0050 by Bisi Correa RN  Flowsheets (Taken 7/25/2023 0050)  Care Plan - Patient's Chronic Conditions and Co-Morbidity Symptoms are Monitored and Maintained or Improved:   Monitor and assess patient's chronic conditions and comorbid symptoms for stability, deterioration, or improvement   Collaborate with multidisciplinary team to address chronic and comorbid conditions and prevent exacerbation or deterioration

## 2023-07-25 NOTE — PROGRESS NOTES
Speech Language Pathology  Facility/Department: [facility] ACUTE REHAB UNIT  Initial Speech/Language/Cognitive Assessment/treatment    NAME: Nat Blakely  : 1940   MRN: [id number]  ADMISSION DATE: 2023  ADMITTING DIAGNOSIS: has Chronic systolic (congestive) heart failure ([address]); Heart failure ([address]); Paroxysmal atrial fibrillation ([address]); Ischemic cardiomyopathy; Hydronephrosis; MICHAEL (acute kidney injury) ([address]); Electrolyte imbalance; Hypertension; Acute combined systolic (congestive) and diastolic (congestive) heart failure ([address]); Moderate mitral regurgitation; Pulmonary HTN ([address]); Elevated troponin; Primary hypertension; Acute upper GI bleed; Debility; Stage 3 chronic kidney disease ([address]); Left-sided weakness; and Dysarthria on their problem list.    DATE ONSET: 23    Date of Eval: 2023   Evaluating Therapist: EFREN Murray    RECENT RESULTS  CT OF HEAD/MRI: 23  IMPRESSION:     1. 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 No intracranial hemorrhage or mass  3. Moderate chronic small vessel ischemia    Primary Complaint: speech clarity-dysarthria     Pain:  Pain Assessment  Pain Assessment: None - Denies Pain  Pain Level: 0  Lynch-Baker Pain Rating: No hurt  Patient's Stated Pain Goal: 0 - No pain    Vision/ Hearing  Vision  Vision Exceptions: Wears glasses for reading  Hearing  Hearing: Exceptions to [facility]  Hearing Exceptions: Hard of hearing/hearing concerns;Bilateral hearing aid    Assessment:  Cognitive Diagnosis: Mild cognitive-linguistic impairments  Speech Diagnosis: Moderate dysarthria   Diagnosis: Pt presents with mild cognitive-linguistic impairments in the areas of attention, working/short term memory, problem solving/safety awareness, and executive function.  Moderate dysarthria characterized by signficantly reduced breath support, reduced articulatory precision/blended phonemic-word boundaries, and minimal vocal intensity/loudness. Pt rated speech 2/10 (0=far from baseline, terrible; 10=baseline speech/normal). Pt reported phonation/respiration coordination as main negative contributing factor to speech clarity. Reduced insight to current impairments related to safey (i.e. getting up by self, use of call button, etc). When provided with prompt related to \"9-1-1\", pt unable to determine and express \"9-1-1\". Pt stated \"It's something with an 11\". Mod cues via binary choice to determine correctly. Pt reported \"word finding\" releated difficulites, however no noted anomia of speech. Reduced task persistence with early termination noted across multiple activities. Reduced attention vs working memory, related to task details. Multiple repetitions/redirections required. Pt reports living by self and was independent at baseline with managing all finaces/medications. SLP services recommend to target speech/language/cognitive skills and progress towards pt's goal of returning home. Recommendations:  Recommendations  Requires SLP Intervention: Yes  Patient Education: Discussed rationale for evaluation, results and recommendations. Patient Education Response: Verbalizes understanding  Timed Code Treatment Minutes: 15 Minutes  Total Treatment Time: 60  Duration of Treatment: LOS  D/C Recommendations: Ongoing speech therapy is recommended during this hospitalization       Plan:   Speech Therapy Prognosis  Prognosis: Good  Prognosis Considerations: Age;Previous Level of Function; Co-Morbidities  Individuals consulted  Consulted and agree with results and recommendations: Patient  RN Name: Jennyfer Vital  Safety Devices in place: Yes  Type of devices: All fall risk precautions in place;Call light within reach; Chair alarm in place; Left in chair  Restraints Initially in Place: No    Goals:  Short Term Goals  Time Frame for Short Term Goals: LOS  Goal 1: Pt will recall/utilize speech

## 2023-07-25 NOTE — PROGRESS NOTES
taps with RW for support, increased verbal cues needed for performing and eccentrical lowering the LLE off the cone, pt \"slams\" the foot on the ground with significant forward flexion 2x10 with increased cues for upright. Patient then performed stepping over various heights with LLE in order to promote foot clearance 2x10. Once patient would stand, pt placing bilateral knees against the mat table for support, worked on continuous sit<>stands with no UE push off 2x10. Circulation/Endurance Exercises: Patient on level 3 performed 1 min, 1.5 minutes, 2 minutes and then 3 minutes with a minute rest break btwn to help with cardio endurance      ASSESSMENT/PROGRESS TOWARDS GOALS       Assessment  Assessment: Patient fulucates with assistance levels throughout the treatment requiring min a for all transfers and CGA_Mod A for ambulation. Pt is make progress with coordination during ambulation with decreased scissoring gait pattern and slightly better foot clearance. He continues to requiring significant cues for hand placement and safety during the session. He tends to have slight short term memory deficits at times. He would benefit from continued therapy in order to improve his functional mobility, transfers, independence due to decreased family support, safety and ambulation. Activity Tolerance: Patient tolerated treatment well  Discharge Recommendations: Continue to assess pending progress;24 hour supervision or assist;Home with Home health PT; Therapy recommended at discharge  PT Equipment Recommendations  Equipment Needed: Yes  Mobility Devices: Sumaya Kaur: Rolling  Other: Continue to assess, pt states he has a RW at home, if not will need one    Goals  Patient Goals   Patient Goals :  To Return Home  Short Term Goals  Time Frame for Short Term Goals: 14 days  Short Term Goal 1: Pt will perform all bed mobility with HOB flat and no handrails, Ind  Short Term Goal 2: Patient will perform sit<>stand transfers to

## 2023-07-25 NOTE — PLAN OF CARE
Problem: Discharge Planning  Goal: Discharge to home or other facility with appropriate resources  7/25/2023 0935 by Natacha Bass RN  Outcome: Progressing     Problem: Safety - Adult  Goal: Free from fall injury  7/25/2023 0935 by Natacha Bass RN  Outcome: Progressing  Note: Patient remains free from falls/injury thus far. Safety measures in place, call light and personal items within reach. Patient using call light appropriately to request assistance. Problem: Skin/Tissue Integrity  Goal: Absence of new skin breakdown  Description: 1. Monitor for areas of redness and/or skin breakdown  2. Assess vascular access sites hourly  3. Every 4-6 hours minimum:  Change oxygen saturation probe site  4. Every 4-6 hours:  If on nasal continuous positive airway pressure, respiratory therapy assess nares and determine need for appliance change or resting period. 7/25/2023 0935 by Natacha Bass RN  Outcome: Progressing  Note: Skin assessed this am, no new areas noted. Problem: ABCDS Injury Assessment  Goal: Absence of physical injury  7/25/2023 0935 by Natacha Bass RN  Outcome: Progressing  Note: Safety measures in place.      Problem: Chronic Conditions and Co-morbidities  Goal: Patient's chronic conditions and co-morbidity symptoms are monitored and maintained or improved  7/25/2023 0935 by Natacha Bass RN  Outcome: Progressing

## 2023-07-25 NOTE — PROGRESS NOTES
Pt in bed, awake watching television. Alert & oriented x 4. BP elevated, other VSS. Assessment completed. No complaints at this time. Nighttime medications given, pt tolerated. Pt refused Nystatin cream to joi area. Reminded pt to call for assistance with any needs. Call light within reach. Safety measures in place.

## 2023-07-25 NOTE — CARE COORDINATION
Case Management Assessment  Initial Evaluation    Date/Time of Evaluation: 7/25/2023 4:07 PM  Assessment Completed by: IRINEO Lan, JD    If patient is discharged prior to next notation, then this note serves as note for discharge by case management. Patient Name: Maurice Client                   YOB: 1940  Diagnosis: Debility [R53.81]                   Date / Time: 7/23/2023  3:35 PM    Patient Admission Status: REHAB IP   Readmission Risk (Low < 19, Mod (19-27), High > 27): Readmission Risk Score: 20.6    Current PCP: No primary care provider on file. PCP verified by CM? Chart Reviewed: Yes      History Provided by: Patient, Medical Record  Patient Orientation: Alert and Oriented    Patient Cognition: Alert    Hospitalization in the last 30 days (Readmission):  No    If yes, Readmission Assessment in CM Navigator will be completed. Advance Directives:      Code Status: Full Code   Patient's Primary Decision Maker is: Patient Declined (Legal Next of Kin Remains as Decision Maker)      Discharge Planning:    Patient lives with: Spouse/Significant Other Type of Home: House  Primary Care Giver: Self  Patient Support Systems include: Family Members   Current Financial resources:    Current community resources:    Current services prior to admission: None            Current DME:              Type of Home Care services:  None    ADLS  Prior functional level:    Current functional level:      PT AM-PAC:   /24  OT AM-PAC:   /24    Family can provide assistance at DC: Would you like Case Management to discuss the discharge plan with any other family members/significant others, and if so, who?     Plans to Return to Present Housing:    Other Identified Issues/Barriers to RETURNING to current housing: med stability  Potential Assistance needed at discharge: [address] [place] [address], [address], Durable Medical Equipment            Potential DME:    Patient expects to discharge to: [facility]  Case Management Department  Ph: [phone]

## 2023-07-25 NOTE — PROGRESS NOTES
Occupational Therapy  Facility/Department: [facility] ACUTE REHAB UNIT  Rehabilitation Occupational Therapy Daily Treatment Note    Date: 23  Patient Name: Guido Pinto       Room: 3103/3103-01  MRN: [id number]  Account: [de-identified]   : 1940  (80 y.o.) Gender: male               Past Medical History:  has a past medical history of CHF (congestive heart failure) ([address]) and Smoker. Past Surgical History:   has a past surgical history that includes Upper gastrointestinal endoscopy (N/A, 2023). Restrictions  Restrictions/Precautions: Fall Risk  Other position/activity restrictions: up with assist, ambulate the patient    Subjective  Subjective: Pt met supine in bed expressing concern about yesterdays test results. RN consulted and pt informed of CT results and upcoming MRI later this morning. Pt pleasant and agreeable throughout session. No pain noted  Restrictions/Precautions: Fall Risk             Objective     Cognition  Cognition Comment: Pt with frustration about medical condition worsening especially left side weakness and difficulty word finding. Orientation  Overall Orientation Status: Within Functional Limits  Orientation Level: Oriented X4         ADL  Feeding  Assistance Level: Set-up  Skilled Clinical Factors: Pt requiring assist to open containers on breakfast tray  Grooming/Oral Hygiene  Assistance Level: Moderate assistance  Skilled Clinical Factors: Pt in stance at sink with fluctuating assist from CGA to Mod A for balance and Min A for assist with L hand to manipulate water off/on, placement of toothpaste and soap/washcloth managment.   Upper Extremity Bathing  Assistance Level: Minimal assistance  Skilled Clinical Factors: Pt seated at sink for washing neck and armpits  Upper Extremity Dressing  Assistance Level: Stand by assist  Skilled Clinical Factors: Pt seated in recliner chair donning and doffing T shirt with ++increased time and cues for placement of LUE into armhole and general positioning. Assessment  Assessment  Assessment: Pt progressing this session but continues to require cues for L side awarness and sequencing transfers/mobiltiy. ADL grooming task completed in stance for oral care and seated for UE washing with heavy cues and Min A for manipulation of soap/washcloth. .. at sink. When in stance pt requiring up to Min A to maintain stand during tasks. Pt completing LE dressing with Min A and ++time and education on tech. Pt continues to benefit from inpt OT. Continue OT per POC. Activity Tolerance: Patient tolerated treatment well  Discharge Recommendations: Home with Home health OT;24 hour supervision or assist;Continue to assess pending progress  OT Equipment Recommendations  ADL Assistive Devices: Transfer Tub Bench;Grab Bars - tub; Toileting - Raised Toilet Seat with arms  Other: cont to assess pending progress  Safety Devices  Safety Devices in place: Yes  Type of devices: Left in chair;Nurse notified; Chair alarm in place;Gait belt    Patient Education  Education  Education Given To: Patient  Education Provided: Role of Therapy;Plan of Care;Transfer Training;Mobility Training; Safety;ADL Function;Equipment  Education Provided Comments: safe transfers tech and awarness of L side, scanning to L  Education Method: Demonstration;Verbal  Barriers to Learning: None  Education Outcome: Verbalized understanding;Demonstrated understanding      Second Session: Pt deeply asleep in chair upon arrival, agreeable to OT session with max VC to arouse. In order to assess strength, ROM and FMC of LUE pt was asked to complete  strength assessment. Pt req Tcs to position LUE onto dynamometer. Results indicate a significant difference in  strength with LUE: 22.8 lb and RUE: 40 lb. Pt was then instructed on completing NHPT in order to assess [facility].  Pt demonstrated significant difficulty with proprioception, [facility] and in hand manipulation AEB not being able to successfully grasp a peg without

## 2023-07-26 ENCOUNTER — NURSE ONLY (OUTPATIENT)
Dept: CARDIOLOGY | Age: 83
End: 2023-07-26

## 2023-07-26 LAB
ANION GAP SERPL CALCULATED.3IONS-SCNC: 9 MMOL/L (ref 3–16)
BASOPHILS # BLD: 0.1 K/UL (ref 0–0.2)
BASOPHILS NFR BLD: 0.6 %
BUN SERPL-MCNC: 48 MG/DL (ref 7–20)
CALCIUM SERPL-MCNC: 8.7 MG/DL (ref 8.3–10.6)
CHLORIDE SERPL-SCNC: 104 MMOL/L (ref 99–110)
CO2 SERPL-SCNC: 25 MMOL/L (ref 21–32)
CREAT SERPL-MCNC: 1.7 MG/DL (ref 0.8–1.3)
DEPRECATED RDW RBC AUTO: 15.7 % (ref 12.4–15.4)
EOSINOPHIL # BLD: 0.2 K/UL (ref 0–0.6)
EOSINOPHIL NFR BLD: 1.9 %
GFR SERPLBLD CREATININE-BSD FMLA CKD-EPI: 40 ML/MIN/{1.73_M2}
GLUCOSE SERPL-MCNC: 96 MG/DL (ref 70–99)
HCT VFR BLD AUTO: 22.5 % (ref 40.5–52.5)
HEMOCCULT STL QL: ABNORMAL
HGB BLD-MCNC: 7.5 G/DL (ref 13.5–17.5)
LYMPHOCYTES # BLD: 1.1 K/UL (ref 1–5.1)
LYMPHOCYTES NFR BLD: 12.8 %
MCH RBC QN AUTO: 31.2 PG (ref 26–34)
MCHC RBC AUTO-ENTMCNC: 33.3 G/DL (ref 31–36)
MCV RBC AUTO: 93.8 FL (ref 80–100)
MONOCYTES # BLD: 0.8 K/UL (ref 0–1.3)
MONOCYTES NFR BLD: 9.4 %
NEUTROPHILS # BLD: 6.3 K/UL (ref 1.7–7.7)
NEUTROPHILS NFR BLD: 75.3 %
PLATELET # BLD AUTO: 287 K/UL (ref 135–450)
PMV BLD AUTO: 7.8 FL (ref 5–10.5)
POTASSIUM SERPL-SCNC: 4.6 MMOL/L (ref 3.5–5.1)
RBC # BLD AUTO: 2.4 M/UL (ref 4.2–5.9)
REASON FOR REJECTION: NORMAL
REJECTED TEST: NORMAL
SODIUM SERPL-SCNC: 138 MMOL/L (ref 136–145)
WBC # BLD AUTO: 8.4 K/UL (ref 4–11)

## 2023-07-26 PROCEDURE — 97110 THERAPEUTIC EXERCISES: CPT

## 2023-07-26 PROCEDURE — 85025 COMPLETE CBC W/AUTO DIFF WBC: CPT

## 2023-07-26 PROCEDURE — 92507 TX SP LANG VOICE COMM INDIV: CPT

## 2023-07-26 PROCEDURE — 6370000000 HC RX 637 (ALT 250 FOR IP): Performed by: PHYSICAL MEDICINE & REHABILITATION

## 2023-07-26 PROCEDURE — 97530 THERAPEUTIC ACTIVITIES: CPT

## 2023-07-26 PROCEDURE — 1280000000 HC REHAB R&B

## 2023-07-26 PROCEDURE — 97535 SELF CARE MNGMENT TRAINING: CPT

## 2023-07-26 PROCEDURE — 82270 OCCULT BLOOD FECES: CPT

## 2023-07-26 PROCEDURE — 80048 BASIC METABOLIC PNL TOTAL CA: CPT

## 2023-07-26 PROCEDURE — 97116 GAIT TRAINING THERAPY: CPT

## 2023-07-26 PROCEDURE — 99232 SBSQ HOSP IP/OBS MODERATE 35: CPT | Performed by: INTERNAL MEDICINE

## 2023-07-26 PROCEDURE — 97129 THER IVNTJ 1ST 15 MIN: CPT

## 2023-07-26 PROCEDURE — 92526 ORAL FUNCTION THERAPY: CPT

## 2023-07-26 PROCEDURE — 36415 COLL VENOUS BLD VENIPUNCTURE: CPT

## 2023-07-26 RX ORDER — MECOBALAMIN 5000 MCG
5 TABLET,DISINTEGRATING ORAL NIGHTLY
Status: DISCONTINUED | OUTPATIENT
Start: 2023-07-26 | End: 2023-08-04 | Stop reason: HOSPADM

## 2023-07-26 RX ORDER — TRAZODONE HYDROCHLORIDE 50 MG/1
50 TABLET ORAL NIGHTLY PRN
Status: DISCONTINUED | OUTPATIENT
Start: 2023-07-26 | End: 2023-08-04 | Stop reason: HOSPADM

## 2023-07-26 RX ADMIN — ATORVASTATIN CALCIUM 40 MG: 40 TABLET, FILM COATED ORAL at 21:07

## 2023-07-26 RX ADMIN — TAMSULOSIN HYDROCHLORIDE 0.4 MG: 0.4 CAPSULE ORAL at 21:07

## 2023-07-26 RX ADMIN — Medication 5 MG: at 21:07

## 2023-07-26 RX ADMIN — PANTOPRAZOLE SODIUM 40 MG: 40 TABLET, DELAYED RELEASE ORAL at 15:34

## 2023-07-26 RX ADMIN — METOPROLOL SUCCINATE 25 MG: 25 TABLET, EXTENDED RELEASE ORAL at 08:27

## 2023-07-26 RX ADMIN — PANTOPRAZOLE SODIUM 40 MG: 40 TABLET, DELAYED RELEASE ORAL at 06:11

## 2023-07-26 RX ADMIN — FUROSEMIDE 20 MG: 20 TABLET ORAL at 08:27

## 2023-07-26 RX ADMIN — NYSTATIN: 100000 CREAM TOPICAL at 08:29

## 2023-07-26 RX ADMIN — FERROUS SULFATE TAB 325 MG (65 MG ELEMENTAL FE) 325 MG: 325 (65 FE) TAB at 08:27

## 2023-07-26 RX ADMIN — NYSTATIN: 100000 CREAM TOPICAL at 21:47

## 2023-07-26 RX ADMIN — BISACODYL 5 MG: 5 TABLET, COATED ORAL at 08:26

## 2023-07-26 ASSESSMENT — PAIN SCALES - GENERAL
PAINLEVEL_OUTOF10: 0

## 2023-07-26 NOTE — PROGRESS NOTES
Physical Therapy  Facility/Department: [facility] ACUTE REHAB UNIT  Rehabilitation Physical Therapy Treatment Note    NAME: Maurice Client  : 1940 (80 y.o.)  MRN: [id number]  CODE STATUS: Full Code    Date of Service: 23       Restrictions:  Restrictions/Precautions: Fall Risk  Position Activity Restriction  Other position/activity restrictions: up with assist, ambulate the patient     SUBJECTIVE  Subjective  Subjective: Patient sitting on the toilet with PCA. Pain: states no pain. OBJECTIVE  Cognition  Overall Cognitive Status: Exceptions  Arousal/Alertness: Appropriate responses to stimuli  Following Commands: Follows one step commands consistently; Follows multistep commands with increased time  Attention Span: Difficulty dividing attention  Memory: Decreased recall of recent events;Decreased short term memory  Safety Judgement: Decreased awareness of need for assistance  Problem Solving: Assistance required to identify errors made;Assistance required to implement solutions  Insights: Decreased awareness of deficits  Initiation: Requires cues for some  Sequencing: Requires cues for some  Cognition Comment: Pt demo low frustration tolerance and easily agitated when tasks become difficult  Orientation  Overall Orientation Status: Within Functional Limits  Orientation Level: Oriented X4    Functional Mobility  Sit to Supine  Assistance Level: Stand by assist  Skilled Clinical Factors: to mat table  Supine to Sit  Assistance Level: Stand by assist  Balance  Sitting Balance: Supervision  Transfers  Surface: To chair with arms;From mat;From chair with arms;Raised toilet Seat; To mat  Additional Factors: Verbal cues; Hand placement cues; Increased time to complete  Sit to Stand  Assistance Level: Contact guard assist  Skilled Clinical Factors: Multiple sit<>stand from RTS, EOM  Stand to Sit  Assistance Level: Contact guard assist  Skilled Clinical Factors: increased verbal cues for taking the LUE off the RW

## 2023-07-26 NOTE — PROGRESS NOTES
Per neuro- aspirin not needed. Just waiting on anticoag clearance per GI. Reconsulted GI this AM. MD aware.

## 2023-07-26 NOTE — PROGRESS NOTES
Blood occult stool sent. Patient had bm- color was dark brown/green. Patient ambulated x 1 with walker and did well. Back in bed resting with eyes closed. Will monitor.

## 2023-07-26 NOTE — PROGRESS NOTES
Replaced 2 week CAM monitor after initial removed for MRI. # [id number] applied per order Melanie alejandro Afib. Instructions given and questions answered. Bedside nurse aware.

## 2023-07-26 NOTE — PLAN OF CARE
Reviewed MRI brain with attending neurologist  OK to resume anticoagulation from a neurologic perspective today  Will not need aspirin from neurologic perspective  GI consulted today and okay w/ resuming as well    Plan  DC ASA  Start Eliquis 5mg BID  Neurology will sign off  Call with questions, concerns, or change in exam    MISTY Fraser - CNP   Neurology & Neurocritical Care   7/26/2023 1:13 PM    ICU Patients:   [facility]: [address]    Floor / PCU Patients:  [facility]: [facility] Neurology

## 2023-07-26 NOTE — PLAN OF CARE
Problem: Discharge Planning  Goal: Discharge to home or other facility with appropriate resources  7/26/2023 0912 by Donovan Feng RN  Outcome: Progressing  Flowsheets (Taken 7/26/2023 0907)  Discharge to home or other facility with appropriate resources:   Identify barriers to discharge with patient and caregiver   Arrange for needed discharge resources and transportation as appropriate   Identify discharge learning needs (meds, wound care, etc)     Problem: Safety - Adult  Goal: Free from fall injury  7/26/2023 0912 by Donovan Feng RN  Outcome: Progressing     Problem: Skin/Tissue Integrity  Goal: Absence of new skin breakdown  Description: 1. Monitor for areas of redness and/or skin breakdown  2. Assess vascular access sites hourly  3. Every 4-6 hours minimum:  Change oxygen saturation probe site  4. Every 4-6 hours:  If on nasal continuous positive airway pressure, respiratory therapy assess nares and determine need for appliance change or resting period.   7/26/2023 0912 by Donovan Feng RN  Outcome: Progressing     Problem: ABCDS Injury Assessment  Goal: Absence of physical injury  7/26/2023 0912 by Donovan Feng RN  Outcome: Progressing     Problem: Chronic Conditions and Co-morbidities  Goal: Patient's chronic conditions and co-morbidity symptoms are monitored and maintained or improved  7/26/2023 0912 by Donovan Feng RN  Outcome: Progressing  Flowsheets (Taken 7/26/2023 0907)  Care Plan - Patient's Chronic Conditions and Co-Morbidity Symptoms are Monitored and Maintained or Improved: Monitor and assess patient's chronic conditions and comorbid symptoms for stability, deterioration, or improvement     Problem: Nutrition Deficit:  Goal: Optimize nutritional status  Outcome: Progressing

## 2023-07-26 NOTE — PROGRESS NOTES
ACUTE REHAB UNIT  SPEECH/LANGUAGE PATHOLOGY      [x] Daily  [] Weekly Care Conference Note  [] Discharge    Patient:Axel Blankenship      :1940  YBM:[id number]  Rehab Dx/Hx: Shelley Odonnell [R53.81]    Precautions: [x] Aspiration  [x] Fall risk  [] Sternal  [] Seizure [] Hip  [] Weight Bearing [] Other     ST Dx: [] Aphasia  [x] Dysarthria  [] Apraxia   [x] Oropharyngeal dysphagia [x] Cognitive Impairment  [] Other:   Date of Admit: 2023  Room #: 3103/3103-01  Date: 2023          Current Diet Order:ADULT ORAL NUTRITION SUPPLEMENT; Lunch; Standard High Calorie/High Protein Oral Supplement  ADULT DIET; Regular; Low Fat/Low Chol/High Fiber/2 gm Na      Compensatory Swallowing Strategies : Alternate solids and liquids, Eat/Feed slowly, Upright as possible for all oral intake, No straws, Effortful swallow, Swallow 2 times per bite/sip, Small bites/sips     Oropharyngeal Phase Impressions: Pt presents with mild to moderate oropharyngeal phase dysphagia. Pt with positive oral acceptance of all PO trials that were self-fed or administered by SLP. Oral phase is characterized by complete labial closure with spoon used to present trials. Due to dentition, pt with prolonged mastication that occurred anteriorly in oral cavity with regular solids, however, was functional. Pt with reduced lingual bolus control with bolus escaping to floor of oral cavity and posterior loss to the level of the pyriform sinuses. With A-P oral bolus transit, pt with mild lingual pumping and trace residue remaining in oral cavity on lingual surface. Pt's swallow was then initiated with head of bolus at the level of the pyriform sinuses for all consistencies trialed. During the swallow, pt with partial laryngeal elevation as arytenoid approximation to the epiglottic petiole was not complete.  Pt also with partial inversion of epiglottis resulting in incomplete laryngeal vestibular closure evidenced by penetration to the level of the vocal folds Group Treatment Minutes 0 0   Co-Treat Minutes 0 0   Brief Exception: N/A N/A   Pain None indicated Denied   Pain Intervention: [] RN notified  [] Repositioned  [] Intervention offered and patient declined  [x] N/A  [] Other: [] RN notified  [] Repositioned  [] Intervention offered and patient declined  [] N/A  [] Other:   Subjective:     Upright in chair upon entry and agreeable to tx session. Seen with meal tray for portion of tx session. Upright in chair upon entry and agreeable to tx session. Objective / Goals:     Pt will tolerate 9/10 trials of thin liquids with no overt s/s associated with aspiration. Trials of thin water via cup edge: 8/10 trials without s/s associated with aspiration. Pt with coughs outside of PO suspect d/t CHF vs aspiration. Goal not targeted this session. Pt will tolerate least restrictive diet level with no s/s associated with aspiration or evidence of respiratory decline. Single cough occurred with regular solids and pt endorsed \"that went down the wrong pipe\". Later pt stated \"there was too much going on\" referring to SLP providing dysphagia tx and RN present administering medications and obtaining vitals. SLP educated pt to rationale for dysphagia therapy during meal time. Observed with pills administered by RN whole in puree; tolerated well. Completed CTARs: 3x15. Goal not targeted this session. Pt/caregiver will demonstrate understanding of swallowing straegies/recommendations. Pt required min cues for oral clearance prior to presentation of bolus. Completed effortful swallows throughout meal with min cues. Goal not targeted this session. Pt will recall/utilize speech intelligibility strategies given min cues. Goal not targeted this session. Not targeted this session. Pt will increase speech intelligibility to 80% in basic conversational exchanges. Goal not targeted this session.   Targeted via breath support exercises:  -Incentive

## 2023-07-26 NOTE — PROGRESS NOTES
AST, ALT, ALB, BILITOT, ALKPHOS in the last 72 hours. Troponin: No results for input(s): TROPONINI in the last 72 hours. BNP: No results for input(s): BNP in the last 72 hours. Lipids:   No results for input(s): CHOL, TRIG, HDL, LDLCALC, LABVLDL in the last 72 hours. ABGs: No results for input(s): PHART, PO2ART, VYG8RHC in the last 72 hours. INR:   No results for input(s): INR in the last 72 hours. UA:No results for input(s): Allie Free, GLUCOSEU, BILIRUBINUR, Cam Sabianism, BLOODU, PHUR, PROTEINU, UROBILINOGEN, NITRU, LEUKOCYTESUR, URINETYPE in the last 72 hours. Invalid input(s): LABMICR   Urine Microscopic: No results for input(s): LABCAST, BACTERIA, COMU, HYALCAST, WBCUA, RBCUA, EPIU in the last 72 hours. Urine Culture: No results for input(s): LABURIN in the last 72 hours. Urine Chemistry: No results for input(s): Agrawal Ravens, PROTEINUR, NAUR in the last 72 hours. IMAGING:  FL MODIFIED BARIUM SWALLOW W VIDEO   Final Result      Intermittent aspiration with thin liquids      Please see speech pathology assessment for dietary recommendations. MRI BRAIN WO CONTRAST   Final Result      1. 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 No intracranial hemorrhage or mass   3. Moderate chronic small vessel ischemia               CT HEAD WO CONTRAST   Final Result      1. No acute intracranial hemorrhage or mass affect. 2. Tiny hypodensity in the left thalamus indicating an age-indeterminate lacunar   infarct. This could be further evaluated with MRI if clinically indicated. 3. Mild chronic small vessel ischemic white matter disease. Medical Decision Making:   The following items were considered in medical decision making:  Discussion of patient care with other providers  Reviewed clinical lab tests  Reviewed radiology tests  Reviewed other diagnostic

## 2023-07-26 NOTE — PLAN OF CARE
Problem: Discharge Planning  Goal: Discharge to home or other facility with appropriate resources  Outcome: Progressing     Problem: Safety - Adult  Goal: Free from fall injury  Outcome: Progressing   Pt remains free from falls during this stay on the ARU. 1:1 and education provided on the importance of using call light to ask for assistance prior to transfers and ambulation. Pt voices understanding. Will continue to monitor and re-educate as needed. Problem: Skin/Tissue Integrity  Goal: Absence of new skin breakdown  Description: 1. Monitor for areas of redness and/or skin breakdown  2. Assess vascular access sites hourly  3. Every 4-6 hours minimum:  Change oxygen saturation probe site  4. Every 4-6 hours:  If on nasal continuous positive airway pressure, respiratory therapy assess nares and determine need for appliance change or resting period. Outcome: Progressing   No new skin issues found, orders in place to treat existing skin integrity. Pt. Able to turn and reposition self in bed Q 2 hrs and PRN. Problem: ABCDS Injury Assessment  Goal: Absence of physical injury  Outcome: Progressing   Pt remains free from accidental injury during this stay on the ARU. Will continue to monitor pt and assess per schedule and prn.      Problem: Chronic Conditions and Co-morbidities  Goal: Patient's chronic conditions and co-morbidity symptoms are monitored and maintained or improved  Outcome: Progressing  Flowsheets (Taken 7/25/2023 2045)  Care Plan - Patient's Chronic Conditions and Co-Morbidity Symptoms are Monitored and Maintained or Improved:   Monitor and assess patient's chronic conditions and comorbid symptoms for stability, deterioration, or improvement   Collaborate with multidisciplinary team to address chronic and comorbid conditions and prevent exacerbation or deterioration   Update acute care plan with appropriate goals if chronic or comorbid symptoms are exacerbated and prevent overall improvement and discharge

## 2023-07-26 NOTE — PROGRESS NOTES
Occupational Therapy  Facility/Department: [facility] ACUTE REHAB UNIT  Rehabilitation Occupational Therapy Daily Treatment Note    Date: 23  Patient Name: Torres Garnett       Room: 3103/3103-01  MRN: [id number]  Account: [de-identified]   : 1940  (80 y.o.) Gender: male                    Past Medical History:  has a past medical history of CHF (congestive heart failure) ([address]) and Smoker. Past Surgical History:   has a past surgical history that includes Upper gastrointestinal endoscopy (N/A, 2023). Restrictions  Restrictions/Precautions: Fall Risk  Other position/activity restrictions: up with assist, ambulate the patient    Subjective  Subjective: Pt sitting in bedside recliner upon arrival asleep but easily aroused via VCs. Pt pleasant and requesting to shower and shave. Restrictions/Precautions: Fall Risk             Objective     Cognition  Overall Cognitive Status: Exceptions  Arousal/Alertness: Appropriate responses to stimuli  Following Commands: Follows one step commands consistently; Follows multistep commands with increased time  Attention Span: Difficulty dividing attention  Memory: Decreased recall of recent events;Decreased short term memory  Safety Judgement: Decreased awareness of need for assistance  Problem Solving: Assistance required to identify errors made;Assistance required to implement solutions  Insights: Decreased awareness of deficits  Initiation: Requires cues for some  Sequencing: Requires cues for some  Cognition Comment: Pt demo low frustration tolerance and easily agitated when tasks become difficult  Orientation  Overall Orientation Status: Within Functional Limits         ADL  Grooming/Oral Hygiene  Assistance Level: Supervision  Skilled Clinical Factors: seated in wc pt performed shaving with electric razor req + time to orient self to novel razor  Upper Extremity Bathing  Assistance Level: Stand by assist  Skilled Clinical Factors: seated on TTB, pt able to

## 2023-07-26 NOTE — CONSULTS
[facility]  GI Consultation                                                                 Patient: Rosy Parikh  : 1940       Date:  2023    Subjective:       History of Present Illness  Patient is a 80 y.o.  male admitted with Debility [R53.81] who is seen in consult for anticoagulation clearance. 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 The pt was previously seen by GI on last week, he underwent an EGD on  which displayed Multiple clean-based cratered ulcers in the first portion of the duodenum with the largest measuring 10 mm and a few erosions gastric antrum and body. During the interview and assessment the pt endorsed having dark colored stools. He denies abdominal pain, nausea, emesis, and diarrhea. Past Medical History:   Diagnosis Date    CHF (congestive heart failure) ([address] [facility])     Smoker       Past Surgical History:   Procedure Laterality Date    UPPER GASTROINTESTINAL ENDOSCOPY N/A 2023    EGD BIOPSY performed by Garon Boxer, MD at [facility] ENDOSCOPY      Past Endoscopic History     EGD (2023): Impression:    Normal second portion of the duodenum. Multiple clean-based cratered ulcers in the first portion of the duodenum, the largest measuring 10 mm. Few erosions gastric antrum and body. Biopsies were obtained evaluate for H. pylori. Normal esophagus. Admission Meds  No current facility-administered medications on file prior to encounter. Current Outpatient Medications on File Prior to Encounter   Medication Sig Dispense Refill    aspirin 81 MG chewable tablet Take 1 tablet by mouth daily Every 3-4 days. Is on ASA daily, has not been using NSAIDs.     Allergies  No Known Allergies   Social   Social History     Tobacco Use    Smoking status: Every Day     Packs/day: 1.00     Types: Cigarettes    Smokeless tobacco: Never

## 2023-07-27 ENCOUNTER — ANESTHESIA EVENT (OUTPATIENT)
Dept: ENDOSCOPY | Age: 83
DRG: 091 | End: 2023-07-27
Payer: MEDICARE

## 2023-07-27 ENCOUNTER — ANESTHESIA (OUTPATIENT)
Dept: ENDOSCOPY | Age: 83
DRG: 091 | End: 2023-07-27
Payer: MEDICARE

## 2023-07-27 PROCEDURE — 3700000000 HC ANESTHESIA ATTENDED CARE: Performed by: INTERNAL MEDICINE

## 2023-07-27 PROCEDURE — 97110 THERAPEUTIC EXERCISES: CPT

## 2023-07-27 PROCEDURE — 2709999900 HC NON-CHARGEABLE SUPPLY: Performed by: INTERNAL MEDICINE

## 2023-07-27 PROCEDURE — 97535 SELF CARE MNGMENT TRAINING: CPT

## 2023-07-27 PROCEDURE — 97530 THERAPEUTIC ACTIVITIES: CPT

## 2023-07-27 PROCEDURE — 2500000003 HC RX 250 WO HCPCS: Performed by: NURSE ANESTHETIST, CERTIFIED REGISTERED

## 2023-07-27 PROCEDURE — 0DJ08ZZ INSPECTION OF UPPER INTESTINAL TRACT, VIA NATURAL OR ARTIFICIAL OPENING ENDOSCOPIC: ICD-10-PCS | Performed by: INTERNAL MEDICINE

## 2023-07-27 PROCEDURE — 92526 ORAL FUNCTION THERAPY: CPT

## 2023-07-27 PROCEDURE — 6370000000 HC RX 637 (ALT 250 FOR IP): Performed by: NURSE PRACTITIONER

## 2023-07-27 PROCEDURE — 99232 SBSQ HOSP IP/OBS MODERATE 35: CPT | Performed by: INTERNAL MEDICINE

## 2023-07-27 PROCEDURE — 97116 GAIT TRAINING THERAPY: CPT

## 2023-07-27 PROCEDURE — 7100000010 HC PHASE II RECOVERY - FIRST 15 MIN: Performed by: INTERNAL MEDICINE

## 2023-07-27 PROCEDURE — 92507 TX SP LANG VOICE COMM INDIV: CPT

## 2023-07-27 PROCEDURE — 1280000000 HC REHAB R&B

## 2023-07-27 PROCEDURE — 6370000000 HC RX 637 (ALT 250 FOR IP): Performed by: PHYSICAL MEDICINE & REHABILITATION

## 2023-07-27 PROCEDURE — 7100000011 HC PHASE II RECOVERY - ADDTL 15 MIN: Performed by: INTERNAL MEDICINE

## 2023-07-27 PROCEDURE — 2580000003 HC RX 258: Performed by: INTERNAL MEDICINE

## 2023-07-27 PROCEDURE — 3609017100 HC EGD: Performed by: INTERNAL MEDICINE

## 2023-07-27 PROCEDURE — 97129 THER IVNTJ 1ST 15 MIN: CPT

## 2023-07-27 PROCEDURE — 6360000002 HC RX W HCPCS: Performed by: NURSE ANESTHETIST, CERTIFIED REGISTERED

## 2023-07-27 RX ORDER — SODIUM CHLORIDE, SODIUM LACTATE, POTASSIUM CHLORIDE, CALCIUM CHLORIDE 600; 310; 30; 20 MG/100ML; MG/100ML; MG/100ML; MG/100ML
INJECTION, SOLUTION INTRAVENOUS ONCE
Status: COMPLETED | OUTPATIENT
Start: 2023-07-27 | End: 2023-07-27

## 2023-07-27 RX ORDER — LIDOCAINE HYDROCHLORIDE 20 MG/ML
INJECTION, SOLUTION EPIDURAL; INFILTRATION; INTRACAUDAL; PERINEURAL PRN
Status: DISCONTINUED | OUTPATIENT
Start: 2023-07-27 | End: 2023-07-27 | Stop reason: SDUPTHER

## 2023-07-27 RX ORDER — PROPOFOL 10 MG/ML
INJECTION, EMULSION INTRAVENOUS PRN
Status: DISCONTINUED | OUTPATIENT
Start: 2023-07-27 | End: 2023-07-27 | Stop reason: SDUPTHER

## 2023-07-27 RX ADMIN — TAMSULOSIN HYDROCHLORIDE 0.4 MG: 0.4 CAPSULE ORAL at 19:43

## 2023-07-27 RX ADMIN — FUROSEMIDE 20 MG: 20 TABLET ORAL at 09:46

## 2023-07-27 RX ADMIN — METOPROLOL SUCCINATE 25 MG: 25 TABLET, EXTENDED RELEASE ORAL at 09:46

## 2023-07-27 RX ADMIN — LIDOCAINE HYDROCHLORIDE 60 MG: 20 INJECTION, SOLUTION EPIDURAL; INFILTRATION; INTRACAUDAL; PERINEURAL at 11:48

## 2023-07-27 RX ADMIN — ATORVASTATIN CALCIUM 40 MG: 40 TABLET, FILM COATED ORAL at 19:44

## 2023-07-27 RX ADMIN — BISACODYL 5 MG: 5 TABLET, COATED ORAL at 09:46

## 2023-07-27 RX ADMIN — NYSTATIN: 100000 CREAM TOPICAL at 20:46

## 2023-07-27 RX ADMIN — APIXABAN 2.5 MG: 5 TABLET, FILM COATED ORAL at 19:43

## 2023-07-27 RX ADMIN — Medication 5 MG: at 19:43

## 2023-07-27 RX ADMIN — NYSTATIN: 100000 CREAM TOPICAL at 09:46

## 2023-07-27 RX ADMIN — FERROUS SULFATE TAB 325 MG (65 MG ELEMENTAL FE) 325 MG: 325 (65 FE) TAB at 09:46

## 2023-07-27 RX ADMIN — SODIUM CHLORIDE, POTASSIUM CHLORIDE, SODIUM LACTATE AND CALCIUM CHLORIDE: 600; 310; 30; 20 INJECTION, SOLUTION INTRAVENOUS at 10:33

## 2023-07-27 RX ADMIN — PANTOPRAZOLE SODIUM 40 MG: 40 TABLET, DELAYED RELEASE ORAL at 16:09

## 2023-07-27 RX ADMIN — PROPOFOL 40 MG: 10 INJECTION, EMULSION INTRAVENOUS at 11:48

## 2023-07-27 ASSESSMENT — PAIN SCALES - GENERAL
PAINLEVEL_OUTOF10: 0

## 2023-07-27 ASSESSMENT — PAIN SCALES - WONG BAKER
WONGBAKER_NUMERICALRESPONSE: 0

## 2023-07-27 ASSESSMENT — PAIN - FUNCTIONAL ASSESSMENT
PAIN_FUNCTIONAL_ASSESSMENT: NONE - DENIES PAIN
PAIN_FUNCTIONAL_ASSESSMENT: WONG-BAKER FACES

## 2023-07-27 ASSESSMENT — LIFESTYLE VARIABLES: SMOKING_STATUS: 0

## 2023-07-27 NOTE — ACP (ADVANCE CARE PLANNING)
Advance Care Planning     Advance Care Planning Inpatient Note  Spiritual Care Department    Today's Date: 7/27/2023  Unit: [facility] ACUTE REHAB UNIT    Received request from IDT Member. Upon review of chart and communication with care team, patient's decision making abilities are not in question. . Patient was/were present in the room during visit. Goals of ACP Conversation:  Discuss advance care planning documents    Health Care Decision Makers:       Primary Decision Maker: Renetta Blankenship - Niece/Nephew - [phone]    Secondary Decision Maker: Davy Anderson - Brother/Sister - [phone]  Summary:  Completed [facility] [place] added information for brother Magda Nassar, pt's choice for [facility], [facility]. decision maker, and verified information for Chrissy Thakkar pt's primary. Advance Care Planning Documents (Patient Wishes):  Healthcare Power of /Advance Directive Appointment of Health Care Agent     Assessment:  Pt and  discussed pt's choices and pt provided contact information. Interventions:  Provided education on documents for clarity and greater understanding  Assisted in the completion of documents according to patient's wishes at this time  Encouraged ongoing ACP conversation with future decision makers and loved ones    Care Preferences Communicated:   No    Outcomes/Plan:  ACP Discussion: Completed  New advance directive completed. Returned original document(s) to patient, as well as copies for distribution to appointed agents  Copy of advance directive given to staff to scan into medical record.     Electronically signed by Shannon Mchugh, [address] [place] [address] on 7/27/2023 at 3:05 PM

## 2023-07-27 NOTE — PROGRESS NOTES
Occupational Therapy  Facility/Department: [facility] ACUTE REHAB UNIT  Rehabilitation Occupational Therapy Daily Treatment Note    Date: 23  Patient Name: Gerardo Marinelli       Room: 3103/3103-01  MRN: [id number]  Account: [de-identified]   : 1940  (80 y.o.) Gender: male                    Past Medical History:  has a past medical history of CHF (congestive heart failure) ([address]) and Smoker. Past Surgical History:   has a past surgical history that includes Upper gastrointestinal endoscopy (N/A, 2023) and Upper gastrointestinal endoscopy (N/A, 2023). Restrictions  Restrictions/Precautions: Fall Risk  Other position/activity restrictions: up with assist, ambulate the patient    Subjective  Subjective: In bed agreeable to session, reporting no pain                Objective     Cognition  Overall Cognitive Status: Exceptions  Arousal/Alertness: Appropriate responses to stimuli  Following Commands: Follows one step commands consistently; Follows multistep commands with increased time  Attention Span: Difficulty dividing attention  Memory: Decreased recall of recent events;Decreased short term memory  Safety Judgement: Decreased awareness of need for assistance  Problem Solving: Assistance required to identify errors made;Assistance required to implement solutions  Insights: Decreased awareness of deficits  Initiation: Requires cues for some  Sequencing: Requires cues for some  Cognition Comment: low frustration tolerance and easily agitated  Orientation  Overall Orientation Status: Within Functional Limits         ADL  Grooming/Oral Hygiene  Assistance Level: Stand by assist  Skilled Clinical Factors: stance at sink washing face and completing oral care  Upper Extremity Dressing  Assistance Level: Stand by assist  Skilled Clinical Factors: SBA to doff t shirt and jacket, and [facility] gown for testing - anticpate more assist when donning regular t shirt and jacket back on  Lower Extremity

## 2023-07-27 NOTE — PLAN OF CARE
Problem: Discharge Planning  Goal: Discharge to home or other facility with appropriate resources  7/26/2023 2205 by Stu Coppola RN  Outcome: Progressing  7/26/2023 0912 by Claudia Hawk RN  Outcome: Progressing  Flowsheets (Taken 7/26/2023 5228)  Discharge to home or other facility with appropriate resources:   Identify barriers to discharge with patient and caregiver   Arrange for needed discharge resources and transportation as appropriate   Identify discharge learning needs (meds, wound care, etc)     Problem: Safety - Adult  Goal: Free from fall injury  7/26/2023 2205 by Stu Coppola RN  Outcome: Progressing  Flowsheets (Taken 7/26/2023 2203)  Free From Fall Injury: Instruct family/caregiver on patient safety  7/26/2023 0912 by Claudia Hawk RN  Outcome: Progressing     Problem: Skin/Tissue Integrity  Goal: Absence of new skin breakdown  Description: 1. Monitor for areas of redness and/or skin breakdown  2. Assess vascular access sites hourly  3. Every 4-6 hours minimum:  Change oxygen saturation probe site  4. Every 4-6 hours:  If on nasal continuous positive airway pressure, respiratory therapy assess nares and determine need for appliance change or resting period.   7/26/2023 2205 by Stu Coppola RN  Outcome: Progressing  7/26/2023 0912 by Claudia Hawk RN  Outcome: Progressing     Problem: ABCDS Injury Assessment  Goal: Absence of physical injury  7/26/2023 2205 by Stu Coppola RN  Outcome: Progressing  7/26/2023 0912 by Claudia Hawk RN  Outcome: Progressing     Problem: Chronic Conditions and Co-morbidities  Goal: Patient's chronic conditions and co-morbidity symptoms are monitored and maintained or improved  7/26/2023 2205 by Stu Coppola RN  Outcome: Progressing  7/26/2023 0912 by Claudia Hawk RN  Outcome: Progressing  Flowsheets (Taken 7/26/2023 0907)  Care Plan - Patient's Chronic Conditions and Co-Morbidity Symptoms are Monitored and Maintained or Improved: Monitor and assess patient's

## 2023-07-27 NOTE — PROGRESS NOTES
07/27/23 0959   Encounter Summary   Encounter Overview/Reason  Initial Encounter; Advance Care Planning   Service Provided For: Patient   Referral/Consult From: Nurse   Support System Unknown   Last Encounter  07/27/23  (rw)   Complexity of Encounter Low   Begin Time 0954   End Time  1000   Total Time Calculated 6 min   Encounter    Type   (adv directives)   Spiritual/Emotional needs   Type Other (comment)  (adv directives)   Assessment/Intervention/Outcome   Assessment Calm   Intervention Other (Comment)  (began acp conversation)   Outcome Engaged in conversation      paged to complete HCPOA document with pt. Pt welcomed  visit.  explained the purpose of naming a [facility] [employer]. decision maker. Pt agreed he would like to complete the document. Visit was interrupted - pt needs to be taken off the unit for a procedure.  will return at another time to complete HCPOA.      Madelyn Blanco M.Div., [facility]   Left message for patient at      Telephone Information:   Mobile [phone]    to schedule procedure.  Patient to return call to [phone].

## 2023-07-27 NOTE — PROGRESS NOTES
07/27/23 1458   Encounter Summary   Encounter Overview/Reason  Advance Care Planning   Service Provided For: Patient   Referral/Consult From: Patient   Support System Family members   Last Encounter  07/27/23  (rw - pt completed HCPOA)   Complexity of Encounter Moderate   Begin Time 1325   End Time  1355   Total Time Calculated 30 min   Encounter    Type Follow up   Advance Care Planning   Type ACP conversation; Completed AD/ACP document(s)   Assessment/Intervention/Outcome   Assessment Calm   Intervention Other (Comment)  (assisted pt in completing HCPOA)   Outcome Engaged in conversation; Concerns relieved   Plan and Referrals   Plan/Referrals No future visits requested     Follow up visit to consult for Advance Directives. Pt indicated to  earlier today that pt would like to complete HCPOA document.  prepared document and returned; reviewed document with pt and pt signed it. See ACP note for details.      Apolinar Puente M.Div., [facility]

## 2023-07-27 NOTE — PROCEDURES
Endoscopy Note    Patient: Marcelino Liazrraga  : 1940  CSN:     Procedure: Esophagogastroduodenoscopy     Date:  2023     Surgeon:  Jaqui Dodd MD     Referring Physician:  Dr Lacy Jimenez     Preoperative Diagnosis: Anemia and Hemoccult positive stools history of having ulcers    Postoperative Diagnosis: #1 hiatal hernia 1 cm #2 mild gastritis #3 patient has 2 duodenal ulcers in the bulb that are clean-based      Anesthesia: Monitored anesthesia care    EBL: <5 mL    Indications: This is a 80y.o. year old male who we were asked to see today and do a upper endoscopy for a check on his ulcers the patient needs to be on anticoagulation he continues to have Hemoccult positive stools    Description of Procedure:  Informed consent was obtained from the patient after explanation of indications, benefits and possible risks and complications of the procedure. The patient was then taken to the endoscopy suite, placed in the left lateral decubitus position and the above IV sedation was administrered. The Olympus videoendoscope was passed through the hypopharynx     Posterior pharynx was normal    Esophagus was normal    Hiatal hernia was 1 to 2 cm    Stomach there was no evidence of ulcers or ulcerations in the fundus body or the antrum there was some inflammation but this had been biopsied a few days ago    Duodenum just in the duodenal bulb the patient had 2 clean-based ulcers one ulcer measures 1 cm the other ulcer was more of a football shape and it measures 1.5 cm there was no evidence of active bleeding but there was lots of inflammation around these ulcers suggesting the patient also has an underlying duodenitis    The distal duodenum was normal    Retroflexion showed the hiatal hernia      Gastric or Duodenal ulcer present: Yes      The patient tolerated the procedure well and was taken to the post anesthesia care unit in good condition.       Impression: 2 clean-based duodenal ulcers not actively bleeding

## 2023-07-27 NOTE — ANESTHESIA POSTPROCEDURE EVALUATION
Department of Anesthesiology  Postprocedure Note    Patient: Guido Pinto  MRN: [id number]  YOB: 1940  Date of evaluation: 7/27/2023      Procedure Summary     Date: 07/27/23 Room / Location: [facility] 01 / The [address] [facility]    Anesthesia Start: 4982 Anesthesia Stop: 5094    Procedure: ESOPHAGOGASTRODUODENOSCOPY Diagnosis:       Hx of gastric ulcer      (Hx of gastric ulcer [Z87.11])    Surgeons: Kirti Naylor MD Responsible Provider: Suad Means DO    Anesthesia Type: MAC ASA Status: 4          Anesthesia Type: No value filed.     Sandi Phase I: Sandi Score: 10    Sandi Phase II: Sandi Score: 6      Anesthesia Post Evaluation    Patient location during evaluation: PACU  Patient participation: complete - patient participated  Level of consciousness: awake and alert  Airway patency: patent  Nausea & Vomiting: no nausea and no vomiting  Cardiovascular status: blood pressure returned to baseline  Respiratory status: acceptable  Hydration status: euvolemic

## 2023-07-27 NOTE — PROGRESS NOTES
ACUTE REHAB UNIT  SPEECH/LANGUAGE PATHOLOGY      [x] Daily  [] Weekly Care Conference Note  [] Discharge    Patient:Axel Blankenship      :1940  [facility]:[id number]  Rehab Dx/Hx: Amita Barrow [R53.81]    Precautions: [x] Aspiration  [x] Fall risk  [] Sternal  [] Seizure [] Hip  [] Weight Bearing [] Other     ST Dx: [] Aphasia  [x] Dysarthria  [] Apraxia   [x] Oropharyngeal dysphagia [x] Cognitive Impairment  [] Other:   Date of Admit: 2023  Room #: 3103/3103-01  Date: 2023          Current Diet Order:Diet NPO Exceptions are: Sips of Water with Meds      Compensatory Swallowing Strategies : Alternate solids and liquids, Eat/Feed slowly, Upright as possible for all oral intake, No straws, Effortful swallow, Swallow 2 times per bite/sip, Small bites/sips     Oropharyngeal Phase Impressions: Pt presents with mild to moderate oropharyngeal phase dysphagia. Pt with positive oral acceptance of all PO trials that were self-fed or administered by SLP. Oral phase is characterized by complete labial closure with spoon used to present trials. Due to dentition, pt with prolonged mastication that occurred anteriorly in oral cavity with regular solids, however, was functional. Pt with reduced lingual bolus control with bolus escaping to floor of oral cavity and posterior loss to the level of the pyriform sinuses. With A-P oral bolus transit, pt with mild lingual pumping and trace residue remaining in oral cavity on lingual surface. Pt's swallow was then initiated with head of bolus at the level of the pyriform sinuses for all consistencies trialed. During the swallow, pt with partial laryngeal elevation as arytenoid approximation to the epiglottic petiole was not complete.  Pt also with partial inversion of epiglottis resulting in incomplete laryngeal vestibular closure evidenced by penetration to the level of the vocal folds that occurred during the swallow with both thin liquids and nectar thick liquids, both of which were

## 2023-07-27 NOTE — PROGRESS NOTES
ascend/descend 12 steps with one HR Mod I    PLAN OF CARE/SAFETY  Physcial Therapy Plan  Days Per Week: 5 Days  Hours Per Day: 1 hour  Therapy Duration: 2 Weeks  Current Treatment Recommendations: Strengthening;Balance training;Functional mobility training;Transfer training;Gait training; Safety education & training;Positioning; Therapeutic activities;Stair training;Patient/Caregiver education & training; Endurance training;Home exercise program  Safety Devices  Type of Devices: Nurse notified;Call light within reach; All fall risk precautions in place; Left in chair;Chair alarm in place    EDUCATION  Education  Education Given To: Patient  Education Provided: Role of Therapy;Transfer Training; Safety; Mobility Training;Plan of Care;Precautions; ADL Function  Education Method: Demonstration  Barriers to Learning: Hearing  Education Outcome: Continued education needed        Therapy Time   Individual Concurrent Group Co-treatment   Time In 3715         Time Out 1505         Minutes 60               Timed Code Treatment Minutes: 60     Total Treatment Minutes: [address] PT, DPT, NCS, [address] [place] [address]

## 2023-07-27 NOTE — PROGRESS NOTES
Patient alert & oriented x 4, denies pain/discomfort thus far. Patient remained NPO for EGD this am. Shift assessment completed, VSS. Patient able to take am meds with sips of water. Patient participated in therapy this am, then down for EDG. Patient returned to unit at [phone], alert and oriented. Lunch given to patient at time of return. Report received per Endo patient clear to restart on anticoagulants. Dr. Colonel Kitchen made aware. Patient in room resting in bed, call light and personal items within reach, safety measures in place.

## 2023-07-27 NOTE — PLAN OF CARE
Problem: Discharge Planning  Goal: Discharge to home or other facility with appropriate resources  Outcome: Progressing     Problem: Safety - Adult  Goal: Free from fall injury  Outcome: Progressing     Problem: Skin/Tissue Integrity  Goal: Absence of new skin breakdown  Description: 1. Monitor for areas of redness and/or skin breakdown  2. Assess vascular access sites hourly  3. Every 4-6 hours minimum:  Change oxygen saturation probe site  4. Every 4-6 hours:  If on nasal continuous positive airway pressure, respiratory therapy assess nares and determine need for appliance change or resting period.   Outcome: Progressing     Problem: ABCDS Injury Assessment  Goal: Absence of physical injury  Outcome: Progressing     Problem: Chronic Conditions and Co-morbidities  Goal: Patient's chronic conditions and co-morbidity symptoms are monitored and maintained or improved  Outcome: Progressing     Problem: Nutrition Deficit:  Goal: Optimize nutritional status  Outcome: Progressing     Problem: Pain  Goal: Verbalizes/displays adequate comfort level or baseline comfort level  Outcome: Progressing  Flowsheets (Taken 7/27/2023 1881)  Verbalizes/displays adequate comfort level or baseline comfort level: Encourage patient to monitor pain and request assistance

## 2023-07-28 LAB
ANION GAP SERPL CALCULATED.3IONS-SCNC: 10 MMOL/L (ref 3–16)
BASOPHILS # BLD: 0.1 K/UL (ref 0–0.2)
BASOPHILS NFR BLD: 1 %
BUN SERPL-MCNC: 40 MG/DL (ref 7–20)
CALCIUM SERPL-MCNC: 8.8 MG/DL (ref 8.3–10.6)
CHLORIDE SERPL-SCNC: 105 MMOL/L (ref 99–110)
CO2 SERPL-SCNC: 26 MMOL/L (ref 21–32)
CREAT SERPL-MCNC: 1.6 MG/DL (ref 0.8–1.3)
DEPRECATED RDW RBC AUTO: 16 % (ref 12.4–15.4)
EOSINOPHIL # BLD: 0.2 K/UL (ref 0–0.6)
EOSINOPHIL NFR BLD: 2.5 %
GFR SERPLBLD CREATININE-BSD FMLA CKD-EPI: 43 ML/MIN/{1.73_M2}
GLUCOSE SERPL-MCNC: 98 MG/DL (ref 70–99)
HCT VFR BLD AUTO: 22.4 % (ref 40.5–52.5)
HGB BLD-MCNC: 7.5 G/DL (ref 13.5–17.5)
LYMPHOCYTES # BLD: 1.2 K/UL (ref 1–5.1)
LYMPHOCYTES NFR BLD: 17.5 %
MCH RBC QN AUTO: 31 PG (ref 26–34)
MCHC RBC AUTO-ENTMCNC: 33.5 G/DL (ref 31–36)
MCV RBC AUTO: 92.6 FL (ref 80–100)
MONOCYTES # BLD: 0.7 K/UL (ref 0–1.3)
MONOCYTES NFR BLD: 9.9 %
NEUTROPHILS # BLD: 4.9 K/UL (ref 1.7–7.7)
NEUTROPHILS NFR BLD: 69.1 %
PLATELET # BLD AUTO: 326 K/UL (ref 135–450)
PMV BLD AUTO: 7.6 FL (ref 5–10.5)
POTASSIUM SERPL-SCNC: 4.4 MMOL/L (ref 3.5–5.1)
RBC # BLD AUTO: 2.42 M/UL (ref 4.2–5.9)
SODIUM SERPL-SCNC: 141 MMOL/L (ref 136–145)
WBC # BLD AUTO: 7 K/UL (ref 4–11)

## 2023-07-28 PROCEDURE — 92526 ORAL FUNCTION THERAPY: CPT

## 2023-07-28 PROCEDURE — 97116 GAIT TRAINING THERAPY: CPT

## 2023-07-28 PROCEDURE — 99232 SBSQ HOSP IP/OBS MODERATE 35: CPT | Performed by: INTERNAL MEDICINE

## 2023-07-28 PROCEDURE — 97130 THER IVNTJ EA ADDL 15 MIN: CPT

## 2023-07-28 PROCEDURE — 97112 NEUROMUSCULAR REEDUCATION: CPT

## 2023-07-28 PROCEDURE — 80048 BASIC METABOLIC PNL TOTAL CA: CPT

## 2023-07-28 PROCEDURE — 97129 THER IVNTJ 1ST 15 MIN: CPT

## 2023-07-28 PROCEDURE — 6370000000 HC RX 637 (ALT 250 FOR IP): Performed by: PHYSICAL MEDICINE & REHABILITATION

## 2023-07-28 PROCEDURE — 97530 THERAPEUTIC ACTIVITIES: CPT

## 2023-07-28 PROCEDURE — 92507 TX SP LANG VOICE COMM INDIV: CPT

## 2023-07-28 PROCEDURE — 6370000000 HC RX 637 (ALT 250 FOR IP): Performed by: NURSE PRACTITIONER

## 2023-07-28 PROCEDURE — 85025 COMPLETE CBC W/AUTO DIFF WBC: CPT

## 2023-07-28 PROCEDURE — 1280000000 HC REHAB R&B

## 2023-07-28 PROCEDURE — 36415 COLL VENOUS BLD VENIPUNCTURE: CPT

## 2023-07-28 PROCEDURE — 97110 THERAPEUTIC EXERCISES: CPT

## 2023-07-28 RX ADMIN — PANTOPRAZOLE SODIUM 40 MG: 40 TABLET, DELAYED RELEASE ORAL at 05:53

## 2023-07-28 RX ADMIN — TRAZODONE HYDROCHLORIDE 50 MG: 50 TABLET ORAL at 20:13

## 2023-07-28 RX ADMIN — FERROUS SULFATE TAB 325 MG (65 MG ELEMENTAL FE) 325 MG: 325 (65 FE) TAB at 09:28

## 2023-07-28 RX ADMIN — METOPROLOL SUCCINATE 25 MG: 25 TABLET, EXTENDED RELEASE ORAL at 10:34

## 2023-07-28 RX ADMIN — ATORVASTATIN CALCIUM 40 MG: 40 TABLET, FILM COATED ORAL at 20:12

## 2023-07-28 RX ADMIN — APIXABAN 2.5 MG: 5 TABLET, FILM COATED ORAL at 20:30

## 2023-07-28 RX ADMIN — PANTOPRAZOLE SODIUM 40 MG: 40 TABLET, DELAYED RELEASE ORAL at 16:05

## 2023-07-28 RX ADMIN — Medication 5 MG: at 20:13

## 2023-07-28 RX ADMIN — TAMSULOSIN HYDROCHLORIDE 0.4 MG: 0.4 CAPSULE ORAL at 20:12

## 2023-07-28 RX ADMIN — NYSTATIN: 100000 CREAM TOPICAL at 20:14

## 2023-07-28 RX ADMIN — NYSTATIN: 100000 CREAM TOPICAL at 09:29

## 2023-07-28 RX ADMIN — BISACODYL 5 MG: 5 TABLET, COATED ORAL at 09:29

## 2023-07-28 RX ADMIN — APIXABAN 2.5 MG: 5 TABLET, FILM COATED ORAL at 09:29

## 2023-07-28 ASSESSMENT — PAIN SCALES - GENERAL
PAINLEVEL_OUTOF10: 0

## 2023-07-28 NOTE — PLAN OF CARE
Problem: Discharge Planning  Goal: Discharge to home or other facility with appropriate resources  7/27/2023 2206 by Nickie Cuenca RN  Outcome: Progressing  7/27/2023 1601 by Yessica Galvez RN  Outcome: Progressing     Problem: Safety - Adult  Goal: Free from fall injury  7/27/2023 2206 by Nickie Cuenca RN  Outcome: Progressing  Flowsheets (Taken 7/27/2023 2205)  Free From Fall Injury: Instruct family/caregiver on patient safety  7/27/2023 1601 by Yessica Galvez RN  Outcome: Progressing     Problem: Skin/Tissue Integrity  Goal: Absence of new skin breakdown  Description: 1. Monitor for areas of redness and/or skin breakdown  2. Assess vascular access sites hourly  3. Every 4-6 hours minimum:  Change oxygen saturation probe site  4. Every 4-6 hours:  If on nasal continuous positive airway pressure, respiratory therapy assess nares and determine need for appliance change or resting period.   7/27/2023 2206 by Nickie Cuenca RN  Outcome: Progressing  7/27/2023 1601 by Yessica Galvez RN  Outcome: Progressing     Problem: ABCDS Injury Assessment  Goal: Absence of physical injury  7/27/2023 2206 by Nickie Cuenca RN  Outcome: Progressing  7/27/2023 1601 by Yessica Galvez RN  Outcome: Progressing     Problem: Chronic Conditions and Co-morbidities  Goal: Patient's chronic conditions and co-morbidity symptoms are monitored and maintained or improved  7/27/2023 2206 by Nickei Cuenca RN  Outcome: Progressing  7/27/2023 1601 by Yessica Galvez RN  Outcome: Progressing     Problem: Nutrition Deficit:  Goal: Optimize nutritional status  7/27/2023 2206 by Nickie Cuenca RN  Outcome: Progressing  7/27/2023 1601 by Yessica Galvez RN  Outcome: Progressing     Problem: Pain  Goal: Verbalizes/displays adequate comfort level or baseline comfort level  7/27/2023 2206 by Nickie Cuenca RN  Outcome: Progressing  Flowsheets (Taken 7/27/2023 1942)  Verbalizes/displays adequate comfort level or baseline comfort level:   Encourage

## 2023-07-28 NOTE — PROGRESS NOTES
Occupational Therapy  Facility/Department: [facility] ACUTE REHAB UNIT  Rehabilitation Occupational Therapy Daily Treatment Note    Date: 23  Patient Name: Gerardo Marinelli       Room: 3103/3103-01  MRN: [id number]  Account: [de-identified]   : 1940  (80 y.o.) Gender: male                    Past Medical History:  has a past medical history of CHF (congestive heart failure) ([address]) and Smoker. Past Surgical History:   has a past surgical history that includes Upper gastrointestinal endoscopy (N/A, 2023) and Upper gastrointestinal endoscopy (N/A, 2023). Restrictions  Restrictions/Precautions: Fall Risk  Other position/activity restrictions: up with assist, ambulate the patient    Subjective  Subjective: Pt in bed agreeable to session, reporting no pain  Restrictions/Precautions: Fall Risk             Objective     Cognition  Overall Cognitive Status: Exceptions  Arousal/Alertness: Appropriate responses to stimuli  Following Commands: Follows one step commands consistently; Follows multistep commands with increased time  Attention Span: Difficulty dividing attention  Memory: Appears intact  Safety Judgement: Decreased awareness of need for assistance  Problem Solving: Assistance required to identify errors made;Assistance required to implement solutions  Insights: Decreased awareness of deficits  Initiation: Requires cues for some  Sequencing: Requires cues for some  Cognition Comment: pt demo low frustration tolerance  Orientation  Overall Orientation Status: Within Functional Limits         ADL  Feeding  Assistance Level: Independent  Skilled Clinical Factors: breakfast tray brought in at EOS, pt able to open containers with + time  Upper Extremity Dressing  Assistance Level:  Moderate assistance  Skilled Clinical Factors: pt donned button up long sleeve shirt req assist to pull LUE from behind back and assist req for buttons after + time and effort from pt but became easily frustrated when LUE had at [employer] and caught with BUEs with CGA x 15 reps, pt initially only using RUE and had difficulty with reaction time dropping ball 3x but improved to utilizing LUE as well; 2) Using red swiss therapy ball pt completed 2 x 15 bounce + catch with BUEs with CGA, pt reached outside PILY with CGA and no overt LOB but had some motor coordination deficits with LUE/delayed reaction time, to grade up task on second trial pt was instructed to name items from a specific category which req ++ time and pt req mod VCs for recall of appropriate items; 3) pt dribbled red swiss therapy ball pt alternating BUEs x 20 reps with CGA and no overt LOB but initially had difficulty producing enough force for successful fluid reps req VCs for improved technique, pt demo delayed reaction time and coordination of LUE; pt demo BISHOP and req seated rest breaks between all sets and reported fatigue in BUEs; vitals monitored- SPO2 96% HR 80     Assessment  Assessment  Assessment: Pt completed IADL task making coffee and multiple standing balance activities requiring frequent rest breaks due to fatigue with stance due to impaired activity tolerance. Pt motivated to regain strength and improve balance and has good insight into deficits and barriers to discharge. Pt required mod A for UB Dressing due to [facility] and strength deficits. Pt functioning below baseline, cont OT per POC. Activity Tolerance: Patient tolerated treatment well;Patient limited by endurance  OT Equipment Recommendations  Equipment Needed: Yes  ADL Assistive Devices: Transfer Tub Bench;Grab Bars - tub; Toileting - Raised Toilet Seat with arms  Other: cont to assess pending progress  Safety Devices  Safety Devices in place: Yes  Type of devices: Call light within reach; Chair alarm in place;Gait belt;Left in chair    Patient Education  Education  Education Given To: Patient  Education Provided: Role of Therapy;Plan of Care;Transfer Training;Mobility Training; Safety;ADL Function;IADL

## 2023-07-28 NOTE — PLAN OF CARE
Problem: Discharge Planning  Goal: Discharge to home or other facility with appropriate resources  Outcome: Progressing  Flowsheets (Taken 7/28/2023 0915)  Discharge to home or other facility with appropriate resources: Identify barriers to discharge with patient and caregiver     Problem: Safety - Adult  Goal: Free from fall injury  Outcome: Progressing  Note: Remains free from falls or injury thus far     Problem: Skin/Tissue Integrity  Goal: Absence of new skin breakdown  Description: 1. Monitor for areas of redness and/or skin breakdown  2. Assess vascular access sites hourly  3. Every 4-6 hours minimum:  Change oxygen saturation probe site  4. Every 4-6 hours:  If on nasal continuous positive airway pressure, respiratory therapy assess nares and determine need for appliance change or resting period.   Outcome: Progressing  Note: Remains free from new skin breakdown     Problem: ABCDS Injury Assessment  Goal: Absence of physical injury  Outcome: Progressing     Problem: Chronic Conditions and Co-morbidities  Goal: Patient's chronic conditions and co-morbidity symptoms are monitored and maintained or improved  Outcome: Progressing  Flowsheets (Taken 7/28/2023 0915)  Care Plan - Patient's Chronic Conditions and Co-Morbidity Symptoms are Monitored and Maintained or Improved: Monitor and assess patient's chronic conditions and comorbid symptoms for stability, deterioration, or improvement     Problem: Nutrition Deficit:  Goal: Optimize nutritional status  Outcome: Progressing     Problem: Pain  Goal: Verbalizes/displays adequate comfort level or baseline comfort level  Outcome: Progressing  Flowsheets (Taken 7/28/2023 0915)  Verbalizes/displays adequate comfort level or baseline comfort level: Encourage patient to monitor pain and request assistance

## 2023-07-28 NOTE — PROGRESS NOTES
ACUTE REHAB UNIT  SPEECH/LANGUAGE PATHOLOGY      [x] Daily  [] Weekly Care Conference Note  [] Discharge    Patient:Axel Blankenship      :1940  [facility]:[id number]  Rehab Dx/Hx: Uday Gregg [R53.81]    Precautions: [x] Aspiration  [x] Fall risk  [] Sternal  [] Seizure [] Hip  [] Weight Bearing [] Other     ST Dx: [] Aphasia  [x] Dysarthria  [] Apraxia   [x] Oropharyngeal dysphagia [x] Cognitive Impairment  [] Other:   Date of Admit: 2023  Room #: 3103/3103-01  Date: 2023          Current Diet Order:ADULT DIET; Regular      Compensatory Swallowing Strategies : Alternate solids and liquids, Eat/Feed slowly, Upright as possible for all oral intake, No straws, Effortful swallow, Swallow 2 times per bite/sip, Small bites/sips     Oropharyngeal Phase Impressions: Pt presents with mild to moderate oropharyngeal phase dysphagia. Pt with positive oral acceptance of all PO trials that were self-fed or administered by SLP. Oral phase is characterized by complete labial closure with spoon used to present trials. Due to dentition, pt with prolonged mastication that occurred anteriorly in oral cavity with regular solids, however, was functional. Pt with reduced lingual bolus control with bolus escaping to floor of oral cavity and posterior loss to the level of the pyriform sinuses. With A-P oral bolus transit, pt with mild lingual pumping and trace residue remaining in oral cavity on lingual surface. Pt's swallow was then initiated with head of bolus at the level of the pyriform sinuses for all consistencies trialed. During the swallow, pt with partial laryngeal elevation as arytenoid approximation to the epiglottic petiole was not complete.  Pt also with partial inversion of epiglottis resulting in incomplete laryngeal vestibular closure evidenced by penetration to the level of the vocal folds that occurred during the swallow with both thin liquids and nectar thick liquids, both of which were inconsistently

## 2023-07-28 NOTE — PROGRESS NOTES
Patient alert & oriented x 3, participating in therapy throughout morning, tolerating well. Shift assessments completed, with blood pressure noted low this am. Metoprolol given per order parameters, Dr. Colonel Kitchen made aware and lasix held this am. Patient remains asymptomatic and fluids encouraged this am. Patient restarted on Eliquis. Denies pain/discomfort thus far. Call light and personal items within reach, safety measures in place.

## 2023-07-28 NOTE — PROGRESS NOTES
Physical Therapy  Facility/Department: [facility] ACUTE REHAB UNIT  Rehabilitation Physical Therapy Treatment Note    NAME: Wisam Kumar  : 1940 (80 y.o.)  MRN: [id number]  CODE STATUS: Full Code    Date of Service: 23       Restrictions:  Restrictions/Precautions: Fall Risk  Position Activity Restriction  Other position/activity restrictions: up with assist, ambulate the patient     SUBJECTIVE  Subjective  Subjective: pt sitting up in recliner on entry, agreeable to PT. Pain: states no pain. OBJECTIVE  Cognition  Overall Cognitive Status: Exceptions  Arousal/Alertness: Appropriate responses to stimuli  Following Commands: Follows one step commands consistently; Follows multistep commands with increased time  Attention Span: Difficulty dividing attention  Memory: Appears intact  Safety Judgement: Decreased awareness of need for assistance  Problem Solving: Assistance required to identify errors made;Assistance required to implement solutions  Insights: Decreased awareness of deficits  Initiation: Requires cues for some  Sequencing: Requires cues for some  Cognition Comment: pt demo low frustration tolerance  Orientation  Overall Orientation Status: Within Functional Limits  Orientation Level: Oriented X4    Functional Mobility  Transfers  Surface: From chair with arms; To chair with arms  Additional Factors: Verbal cues; Hand placement cues; Increased time to complete  Sit to Stand  Assistance Level: Contact guard assist  Skilled Clinical Factors: Multiple sit<>stand from standard chair, EOM, recliner. Stand to Sit  Assistance Level: Contact guard assist  Skilled Clinical Factors: increased verbal cues for taking the LUE off the RW before descending to various surfaces      Environmental Mobility  Ambulation  Surface: Level surface  Device: Rolling walker (no ad)  Distance: 150ft with RW+ 150ft with no assistive device  Activity: Within Unit  Additional Factors: Verbal cues; Hand placement

## 2023-07-29 PROCEDURE — 6370000000 HC RX 637 (ALT 250 FOR IP): Performed by: NURSE PRACTITIONER

## 2023-07-29 PROCEDURE — 6370000000 HC RX 637 (ALT 250 FOR IP): Performed by: PHYSICAL MEDICINE & REHABILITATION

## 2023-07-29 PROCEDURE — 1280000000 HC REHAB R&B

## 2023-07-29 PROCEDURE — 2580000003 HC RX 258: Performed by: PHYSICAL MEDICINE & REHABILITATION

## 2023-07-29 PROCEDURE — 99232 SBSQ HOSP IP/OBS MODERATE 35: CPT | Performed by: INTERNAL MEDICINE

## 2023-07-29 RX ADMIN — APIXABAN 2.5 MG: 5 TABLET, FILM COATED ORAL at 20:07

## 2023-07-29 RX ADMIN — PANTOPRAZOLE SODIUM 40 MG: 40 TABLET, DELAYED RELEASE ORAL at 17:13

## 2023-07-29 RX ADMIN — BISACODYL 5 MG: 5 TABLET, COATED ORAL at 10:25

## 2023-07-29 RX ADMIN — TAMSULOSIN HYDROCHLORIDE 0.4 MG: 0.4 CAPSULE ORAL at 20:06

## 2023-07-29 RX ADMIN — PANTOPRAZOLE SODIUM 40 MG: 40 TABLET, DELAYED RELEASE ORAL at 06:14

## 2023-07-29 RX ADMIN — APIXABAN 2.5 MG: 5 TABLET, FILM COATED ORAL at 10:25

## 2023-07-29 RX ADMIN — FERROUS SULFATE TAB 325 MG (65 MG ELEMENTAL FE) 325 MG: 325 (65 FE) TAB at 10:25

## 2023-07-29 RX ADMIN — POLYETHYLENE GLYCOL 3350 17 G: 17 POWDER, FOR SOLUTION ORAL at 17:53

## 2023-07-29 RX ADMIN — SODIUM CHLORIDE, PRESERVATIVE FREE 1 ML: 5 INJECTION INTRAVENOUS at 10:10

## 2023-07-29 RX ADMIN — FUROSEMIDE 20 MG: 20 TABLET ORAL at 10:25

## 2023-07-29 RX ADMIN — NYSTATIN: 100000 CREAM TOPICAL at 10:28

## 2023-07-29 RX ADMIN — Medication 5 MG: at 22:33

## 2023-07-29 RX ADMIN — ATORVASTATIN CALCIUM 40 MG: 40 TABLET, FILM COATED ORAL at 20:06

## 2023-07-29 RX ADMIN — DICLOFENAC SODIUM 2 G: 10 GEL TOPICAL at 17:13

## 2023-07-29 RX ADMIN — METOPROLOL SUCCINATE 25 MG: 25 TABLET, EXTENDED RELEASE ORAL at 10:25

## 2023-07-29 ASSESSMENT — PAIN DESCRIPTION - ORIENTATION: ORIENTATION: LEFT

## 2023-07-29 ASSESSMENT — PAIN DESCRIPTION - DESCRIPTORS: DESCRIPTORS: STABBING;SPASM

## 2023-07-29 ASSESSMENT — PAIN DESCRIPTION - LOCATION: LOCATION: ANKLE

## 2023-07-29 ASSESSMENT — PAIN - FUNCTIONAL ASSESSMENT: PAIN_FUNCTIONAL_ASSESSMENT: ACTIVITIES ARE NOT PREVENTED

## 2023-07-29 ASSESSMENT — PAIN SCALES - WONG BAKER
WONGBAKER_NUMERICALRESPONSE: 0

## 2023-07-29 ASSESSMENT — PAIN DESCRIPTION - FREQUENCY: FREQUENCY: INTERMITTENT

## 2023-07-29 ASSESSMENT — PAIN SCALES - GENERAL: PAINLEVEL_OUTOF10: 5

## 2023-07-29 ASSESSMENT — PAIN DESCRIPTION - PAIN TYPE: TYPE: ACUTE PAIN

## 2023-07-29 ASSESSMENT — PAIN DESCRIPTION - DIRECTION: RADIATING_TOWARDS: LEFT CALF

## 2023-07-29 ASSESSMENT — PAIN DESCRIPTION - ONSET: ONSET: GRADUAL

## 2023-07-29 NOTE — PROGRESS NOTES
C/o \"stabby Pain\" in left ankle overnight that reappeared after transfer to chair. Patient associated the pain with recent walking during therapy session without shoes on. He is now wearing his sturdy shoes from home.

## 2023-07-29 NOTE — PLAN OF CARE
Problem: Discharge Planning  Goal: Discharge to home or other facility with appropriate resources  7/28/2023 2024 by Jeny Kimble RN  Outcome: Progressing     Problem: Safety - Adult  Goal: Free from fall injury  7/28/2023 2024 by Jeny Kimble RN  Outcome: Progressing     Problem: Skin/Tissue Integrity  Goal: Absence of new skin breakdown  Description: 1. Monitor for areas of redness and/or skin breakdown  2. Assess vascular access sites hourly  3. Every 4-6 hours minimum:  Change oxygen saturation probe site  4. Every 4-6 hours:  If on nasal continuous positive airway pressure, respiratory therapy assess nares and determine need for appliance change or resting period.   7/28/2023 2024 by Jeny Kimble RN  Outcome: Progressing     Problem: ABCDS Injury Assessment  Goal: Absence of physical injury  7/28/2023 2024 by Jeny Kimble RN  Outcome: Progressing     Problem: Chronic Conditions and Co-morbidities  Goal: Patient's chronic conditions and co-morbidity symptoms are monitored and maintained or improved  7/28/2023 2024 by eJny Kimble RN  Outcome: Progressing     Problem: Pain  Goal: Verbalizes/displays adequate comfort level or baseline comfort level  7/28/2023 2024 by Jeny Kimble RN  Outcome: Progressing

## 2023-07-29 NOTE — PROGRESS NOTES
Oriented x  4. Assist x 1, gait belt and walker; patient prefers to walk with his shoes on, c/o pain in left ankle that he associates with walking in socks, he is accustomed to the support of his sturdy shoes. C/o hard BM this afternoon, after dinner dose of glycolax administered; increased fluid intake encouraged. Call light in reach, alarm on for safety.

## 2023-07-29 NOTE — PROGRESS NOTES
Nephrology Progress Note                                                                                                                                                                                                                                                                                                                                                               Office : [phone]     Fax :[phone]              Patient's Name: Gabriel Quinonez    7/28/2023      Assessment/Plan     1 MICHAEL/CKD   - Possibly obstructive, pre-renal due to possible CHF exacerbation    2. HTN    3. Acute anemia  - GI consulted for GIB  - S/p EGD with duodenal ulcers    4. Acid- base/ Electrolyte imbalance     5. COPD exacerbation     6. Bilateral pleural effusions     7. HFrEF   - EF 15-20%, grade 2 DD, moderate MR, pulmonary HTN    8. Atrial flutter     9. 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 Chief Complaint:  Dyspnea    Interval hx:    Sitting up in a chair  Says is breathing is better    Cr stable  BP stable  Good UOP     reports that he has been smoking cigarettes. He has been smoking an average of 1 pack per day. He has never used smokeless tobacco. He reports that he does not currently use alcohol. He reports that he does not currently use drugs. Allergies:  Patient has no known allergies.     Current Medications: 07/26/23  0544 07/28/23  0601   CALCIUM 8.7 8.8       Hepatic:   No results for input(s): AST, ALT, ALB, BILITOT, ALKPHOS in the last 72 hours. Troponin: No results for input(s): TROPONINI in the last 72 hours. BNP: No results for input(s): BNP in the last 72 hours. Lipids:   No results for input(s): CHOL, TRIG, HDL, LDLCALC, LABVLDL in the last 72 hours. ABGs: No results for input(s): PHART, PO2ART, XAP3RLV in the last 72 hours. INR:   No results for input(s): INR in the last 72 hours. UA:No results for input(s): Sabiha Eunice, GLUCOSEU, BILIRUBINUR, Cathalene Bridget, BLOODU, PHUR, PROTEINU, UROBILINOGEN, NITRU, LEUKOCYTESUR, URINETYPE in the last 72 hours. Invalid input(s): LABMICR   Urine Microscopic: No results for input(s): LABCAST, BACTERIA, COMU, HYALCAST, WBCUA, RBCUA, EPIU in the last 72 hours. Urine Culture: No results for input(s): LABURIN in the last 72 hours. Urine Chemistry: No results for input(s): Prasad Gables, PROTEINUR, NAUR in the last 72 hours. IMAGING:  FL MODIFIED BARIUM SWALLOW W VIDEO   Final Result      Intermittent aspiration with thin liquids      Please see speech pathology assessment for dietary recommendations. MRI BRAIN WO CONTRAST   Final Result      1. 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 No intracranial hemorrhage or mass   3. Moderate chronic small vessel ischemia               CT HEAD WO CONTRAST   Final Result      1. No acute intracranial hemorrhage or mass affect. 2. Tiny hypodensity in the left thalamus indicating an age-indeterminate lacunar   infarct. This could be further evaluated with MRI if clinically indicated. 3. Mild chronic small vessel ischemic white matter disease. Medical Decision Making:   The following items were considered in medical decision making:  Discussion of patient care with other

## 2023-07-29 NOTE — PLAN OF CARE
Problem: Discharge Planning  Goal: Discharge to home or other facility with appropriate resources  Outcome: Progressing  Note: Discussed living alone and the need to practice fall risk reduction techniques, his response was \"that's just common sense\". Problem: Safety - Adult  Goal: Free from fall injury  Outcome: Progressing  Note: Call light use compliant. Problem: Chronic Conditions and Co-morbidities  Goal: Patient's chronic conditions and co-morbidity symptoms are monitored and maintained or improved  Outcome: Progressing     Problem: Nutrition Deficit:  Goal: Optimize nutritional status  Outcome: Progressing  Note: Patient ate all of his dinner. Problem: Pain  Goal: Verbalizes/displays adequate comfort level or baseline comfort level  Outcome: Progressing  Note: C/o L ankle pain, voltaren gel applied with good result.

## 2023-07-30 VITALS
HEIGHT: 72 IN | BODY MASS INDEX: 18.16 KG/M2 | OXYGEN SATURATION: 96 % | WEIGHT: 134.04 LBS | HEART RATE: 66 BPM | TEMPERATURE: 97.8 F | SYSTOLIC BLOOD PRESSURE: 134 MMHG | DIASTOLIC BLOOD PRESSURE: 65 MMHG | RESPIRATION RATE: 16 BRPM

## 2023-07-30 PROCEDURE — 6370000000 HC RX 637 (ALT 250 FOR IP): Performed by: PHYSICAL MEDICINE & REHABILITATION

## 2023-07-30 PROCEDURE — 1280000000 HC REHAB R&B

## 2023-07-30 PROCEDURE — 6370000000 HC RX 637 (ALT 250 FOR IP): Performed by: NURSE PRACTITIONER

## 2023-07-30 PROCEDURE — 99232 SBSQ HOSP IP/OBS MODERATE 35: CPT | Performed by: INTERNAL MEDICINE

## 2023-07-30 RX ORDER — CALCIUM CARBONATE 500 MG/1
500 TABLET, CHEWABLE ORAL 3 TIMES DAILY PRN
Status: DISCONTINUED | OUTPATIENT
Start: 2023-07-30 | End: 2023-08-04 | Stop reason: HOSPADM

## 2023-07-30 RX ADMIN — TAMSULOSIN HYDROCHLORIDE 0.4 MG: 0.4 CAPSULE ORAL at 19:51

## 2023-07-30 RX ADMIN — ATORVASTATIN CALCIUM 40 MG: 40 TABLET, FILM COATED ORAL at 19:51

## 2023-07-30 RX ADMIN — BISACODYL 5 MG: 5 TABLET, COATED ORAL at 09:36

## 2023-07-30 RX ADMIN — APIXABAN 2.5 MG: 5 TABLET, FILM COATED ORAL at 19:51

## 2023-07-30 RX ADMIN — DICLOFENAC SODIUM 2 G: 10 GEL TOPICAL at 20:03

## 2023-07-30 RX ADMIN — DICLOFENAC SODIUM 2 G: 10 GEL TOPICAL at 17:32

## 2023-07-30 RX ADMIN — METOPROLOL SUCCINATE 25 MG: 25 TABLET, EXTENDED RELEASE ORAL at 09:36

## 2023-07-30 RX ADMIN — DICLOFENAC SODIUM 2 G: 10 GEL TOPICAL at 09:36

## 2023-07-30 RX ADMIN — FUROSEMIDE 20 MG: 20 TABLET ORAL at 09:36

## 2023-07-30 RX ADMIN — APIXABAN 2.5 MG: 5 TABLET, FILM COATED ORAL at 09:35

## 2023-07-30 RX ADMIN — NYSTATIN: 100000 CREAM TOPICAL at 20:01

## 2023-07-30 RX ADMIN — FERROUS SULFATE TAB 325 MG (65 MG ELEMENTAL FE) 325 MG: 325 (65 FE) TAB at 09:38

## 2023-07-30 RX ADMIN — PANTOPRAZOLE SODIUM 40 MG: 40 TABLET, DELAYED RELEASE ORAL at 06:21

## 2023-07-30 RX ADMIN — NYSTATIN: 100000 CREAM TOPICAL at 09:36

## 2023-07-30 RX ADMIN — CALCIUM CARBONATE 500 MG: 500 TABLET, CHEWABLE ORAL at 14:34

## 2023-07-30 RX ADMIN — PANTOPRAZOLE SODIUM 40 MG: 40 TABLET, DELAYED RELEASE ORAL at 17:32

## 2023-07-30 ASSESSMENT — PAIN SCALES - GENERAL: PAINLEVEL_OUTOF10: 0

## 2023-07-30 NOTE — PROGRESS NOTES
Pt in bed, awake watching television. Alert & oriented x 4. BP slightly elevated, other VSS. Assessment completed. No complaints at this time. Nighttime medications given excluding the Melatonin, pt did not want at this time. Pt tolerated medications well. Reminded pt to call for assistance with any needs. Call light within reach. Safety measures in place.

## 2023-07-30 NOTE — PROGRESS NOTES
Nephrology Progress Note                                                                                                                                                                                                                                                                                                                                                               Office : [phone]     Fax :[phone]              Patient's Name: Torres Garnett    7/30/2023      Assessment/Plan     1 MICHAEL/CKD   - Possibly obstructive, pre-renal due to possible CHF exacerbation    2. HTN    3. Acute anemia  - GI consulted for GIB  - S/p EGD with duodenal ulcers    4. Acid- base/ Electrolyte imbalance     5. COPD exacerbation     6. Bilateral pleural effusions     7. HFrEF   - EF 15-20%, grade 2 DD, moderate MR, pulmonary HTN    8. Atrial flutter     9. Urinary retention  - S/p complex thomson cath placement 7/19  - Flomax initiated     10. CVA  - New embolic, per MRI 5/10    Plan:  - Cr stable  - Continue PO Lasix   - Coronary angiogram cancelled d/t GIB - to be done outpatient  - BNP 92428----> 6000  - Urine studies - reviewed   - Monitor UOP    Recommend to dose adjust all medications  based on renal functions  Maintain SBP> 90 mmHg   Daily weights   AVOID NSAIDs  Avoid Nephrotoxins  Monitor Intake/Output  Call if significant decrease in urine output       Reason for Consult:  MICHAEL/CKD   Requesting Physician:  No primary care provider on file. Chief Complaint:  Dyspnea    Interval hx:    Feels well  Ambulated the gibson earlier and carried his walker    No updated labs today  BP's stable       reports that he has been smoking cigarettes. He has been smoking an average of 1 pack per day. He has never used smokeless tobacco. He reports that he does not currently use alcohol. He reports that he does not currently use drugs. Allergies:  Patient has no known allergies.     Current Medications:    diclofenac sodium

## 2023-07-30 NOTE — PROGRESS NOTES
Oriented x 4. Assist x 1, gait belt and walker. Patient is aware that his discharge date, 8/4, is \"soft\". He is edgy today, feeling too restricted, he would like to be up to exercise \"on my own\". Made plans to walk in the gibson with him after his brother and sister in law have finished their visit. Call light in reach, alarm on for safety. Tums administered per MAR. Patient denies pain in left ankle, voltaren gel administered per MAR.

## 2023-07-30 NOTE — PLAN OF CARE
Problem: Discharge Planning  Goal: Discharge to home or other facility with appropriate resources  7/30/2023 0103 by Lizzeth Lopez RN  Flowsheets (Taken 7/30/2023 5628)  Discharge to home or other facility with appropriate resources:   Identify barriers to discharge with patient and caregiver   Identify discharge learning needs (meds, wound care, etc)     Problem: Safety - Adult  Goal: Free from fall injury  7/30/2023 0103 by Lizzeth Lopez RN  Outcome: Progressing  Flowsheets (Taken 7/30/2023 0103)  Free From Fall Injury: Instruct family/caregiver on patient safety     Problem: Skin/Tissue Integrity  Goal: Absence of new skin breakdown  Description: 1.   Monitor for areas of redness and/or skin breakdown  Outcome: Progressing     Problem: ABCDS Injury Assessment  Goal: Absence of physical injury  Outcome: Progressing  Flowsheets (Taken 7/30/2023 0103)  Absence of Physical Injury: Implement safety measures based on patient assessment     Problem: Chronic Conditions and Co-morbidities  Goal: Patient's chronic conditions and co-morbidity symptoms are monitored and maintained or improved  7/30/2023 0103 by Lizzeth Lopez RN  Outcome: Not Progressing  Flowsheets (Taken 7/30/2023 0103)  Care Plan - Patient's Chronic Conditions and Co-Morbidity Symptoms are Monitored and Maintained or Improved:   Monitor and assess patient's chronic conditions and comorbid symptoms for stability, deterioration, or improvement   Collaborate with multidisciplinary team to address chronic and comorbid conditions and prevent exacerbation or deterioration    Problem: Pain  Goal: Verbalizes/displays adequate comfort level or baseline comfort level  7/30/2023 0103 by Lizzeth Lopez RN  Outcome: Adequate for Discharge  Flowsheets (Taken 7/30/2023 0103)  Verbalizes/displays adequate comfort level or baseline comfort level:   Encourage patient to monitor pain and request assistance   Assess pain using appropriate pain scale   Administer analgesics

## 2023-07-30 NOTE — PLAN OF CARE
Problem: Discharge Planning  Goal: Discharge to home or other facility with appropriate resources  7/30/2023 1450 by Randy Hawley RN  Outcome: Progressing  Note: Patient let his brother and sister in law know about discharge planned for 8/4. Problem: Safety - Adult  Goal: Free from fall injury  7/30/2023 1450 by Randy Hawley RN  Outcome: Progressing  Note: Patient strong and steady enough to carry his walker during and extended walk accompanied by PCA. Problem: Skin/Tissue Integrity  Goal: Absence of new skin breakdown  7/30/2023 1450 by Randy Hawley RN  Outcome: Progressing  Note: Skin intact    Problem: Chronic Conditions and Co-morbidities  Goal: Patient's chronic conditions and co-morbidity symptoms are monitored and maintained or improved  7/30/2023 1450 by Randy Hawley RN  Outcome: Progressing  Note: Observed pursed lip-breathing, SpO2 WNL. Problem: Nutrition Deficit:  Goal: Optimize nutritional status  Outcome: Progressing  Note: Patient likes the food here.

## 2023-07-31 LAB
ANION GAP SERPL CALCULATED.3IONS-SCNC: 13 MMOL/L (ref 3–16)
BASOPHILS # BLD: 0.1 K/UL (ref 0–0.2)
BASOPHILS NFR BLD: 1.1 %
BUN SERPL-MCNC: 26 MG/DL (ref 7–20)
CALCIUM SERPL-MCNC: 8.6 MG/DL (ref 8.3–10.6)
CHLORIDE SERPL-SCNC: 102 MMOL/L (ref 99–110)
CO2 SERPL-SCNC: 23 MMOL/L (ref 21–32)
CREAT SERPL-MCNC: 1.5 MG/DL (ref 0.8–1.3)
DEPRECATED RDW RBC AUTO: 15.8 % (ref 12.4–15.4)
EOSINOPHIL # BLD: 0.2 K/UL (ref 0–0.6)
EOSINOPHIL NFR BLD: 3.1 %
GFR SERPLBLD CREATININE-BSD FMLA CKD-EPI: 46 ML/MIN/{1.73_M2}
GLUCOSE SERPL-MCNC: 88 MG/DL (ref 70–99)
HCT VFR BLD AUTO: 22.7 % (ref 40.5–52.5)
HGB BLD-MCNC: 7.8 G/DL (ref 13.5–17.5)
LYMPHOCYTES # BLD: 1.2 K/UL (ref 1–5.1)
LYMPHOCYTES NFR BLD: 20.8 %
MCH RBC QN AUTO: 31.6 PG (ref 26–34)
MCHC RBC AUTO-ENTMCNC: 34.3 G/DL (ref 31–36)
MCV RBC AUTO: 92.2 FL (ref 80–100)
MONOCYTES # BLD: 0.5 K/UL (ref 0–1.3)
MONOCYTES NFR BLD: 8.1 %
NEUTROPHILS # BLD: 4 K/UL (ref 1.7–7.7)
NEUTROPHILS NFR BLD: 66.9 %
PLATELET # BLD AUTO: 336 K/UL (ref 135–450)
PMV BLD AUTO: 7.5 FL (ref 5–10.5)
POTASSIUM SERPL-SCNC: 4.3 MMOL/L (ref 3.5–5.1)
RBC # BLD AUTO: 2.46 M/UL (ref 4.2–5.9)
SODIUM SERPL-SCNC: 138 MMOL/L (ref 136–145)
WBC # BLD AUTO: 5.9 K/UL (ref 4–11)

## 2023-07-31 PROCEDURE — 36415 COLL VENOUS BLD VENIPUNCTURE: CPT

## 2023-07-31 PROCEDURE — 1280000000 HC REHAB R&B

## 2023-07-31 PROCEDURE — 80048 BASIC METABOLIC PNL TOTAL CA: CPT

## 2023-07-31 PROCEDURE — 6370000000 HC RX 637 (ALT 250 FOR IP): Performed by: NURSE PRACTITIONER

## 2023-07-31 PROCEDURE — 97110 THERAPEUTIC EXERCISES: CPT

## 2023-07-31 PROCEDURE — 97535 SELF CARE MNGMENT TRAINING: CPT

## 2023-07-31 PROCEDURE — 92507 TX SP LANG VOICE COMM INDIV: CPT

## 2023-07-31 PROCEDURE — 92526 ORAL FUNCTION THERAPY: CPT

## 2023-07-31 PROCEDURE — 97530 THERAPEUTIC ACTIVITIES: CPT

## 2023-07-31 PROCEDURE — 97116 GAIT TRAINING THERAPY: CPT

## 2023-07-31 PROCEDURE — 85025 COMPLETE CBC W/AUTO DIFF WBC: CPT

## 2023-07-31 PROCEDURE — 99232 SBSQ HOSP IP/OBS MODERATE 35: CPT | Performed by: INTERNAL MEDICINE

## 2023-07-31 PROCEDURE — 6370000000 HC RX 637 (ALT 250 FOR IP): Performed by: PHYSICAL MEDICINE & REHABILITATION

## 2023-07-31 RX ADMIN — APIXABAN 2.5 MG: 5 TABLET, FILM COATED ORAL at 08:51

## 2023-07-31 RX ADMIN — FERROUS SULFATE TAB 325 MG (65 MG ELEMENTAL FE) 325 MG: 325 (65 FE) TAB at 08:51

## 2023-07-31 RX ADMIN — ATORVASTATIN CALCIUM 40 MG: 40 TABLET, FILM COATED ORAL at 21:24

## 2023-07-31 RX ADMIN — Medication 5 MG: at 21:25

## 2023-07-31 RX ADMIN — PANTOPRAZOLE SODIUM 40 MG: 40 TABLET, DELAYED RELEASE ORAL at 07:28

## 2023-07-31 RX ADMIN — TAMSULOSIN HYDROCHLORIDE 0.4 MG: 0.4 CAPSULE ORAL at 21:25

## 2023-07-31 RX ADMIN — BISACODYL 5 MG: 5 TABLET, COATED ORAL at 08:55

## 2023-07-31 RX ADMIN — NYSTATIN: 100000 CREAM TOPICAL at 21:24

## 2023-07-31 RX ADMIN — FUROSEMIDE 20 MG: 20 TABLET ORAL at 08:51

## 2023-07-31 RX ADMIN — NYSTATIN: 100000 CREAM TOPICAL at 11:56

## 2023-07-31 RX ADMIN — PANTOPRAZOLE SODIUM 40 MG: 40 TABLET, DELAYED RELEASE ORAL at 15:41

## 2023-07-31 RX ADMIN — APIXABAN 2.5 MG: 5 TABLET, FILM COATED ORAL at 21:24

## 2023-07-31 RX ADMIN — DICLOFENAC SODIUM 2 G: 10 GEL TOPICAL at 14:14

## 2023-07-31 RX ADMIN — METOPROLOL SUCCINATE 25 MG: 25 TABLET, EXTENDED RELEASE ORAL at 08:51

## 2023-07-31 ASSESSMENT — PAIN SCALES - GENERAL
PAINLEVEL_OUTOF10: 0

## 2023-07-31 NOTE — PROGRESS NOTES
ACUTE REHAB UNIT  SPEECH/LANGUAGE PATHOLOGY      [x] Daily  [] Weekly Care Conference Note  [] Discharge    Patient:Axel Blankenship      :1940  [facility]:[id number]  Rehab Dx/Hx: Gissel Napier [R53.81]    Precautions: [x] Aspiration  [x] Fall risk  [] Sternal  [] Seizure [] Hip  [] Weight Bearing [] Other     ST Dx: [] Aphasia  [x] Dysarthria  [] Apraxia   [x] Oropharyngeal dysphagia [x] Cognitive Impairment  [] Other:   Date of Admit: 2023  Room #: 3103/3103-01  Date: 2023          Current Diet Order:ADULT DIET; Regular      Compensatory Swallowing Strategies : Alternate solids and liquids, Eat/Feed slowly, Upright as possible for all oral intake, No straws, Effortful swallow, Swallow 2 times per bite/sip, Small bites/sips     Oropharyngeal Phase Impressions: Pt presents with mild to moderate oropharyngeal phase dysphagia. Pt with positive oral acceptance of all PO trials that were self-fed or administered by SLP. Oral phase is characterized by complete labial closure with spoon used to present trials. Due to dentition, pt with prolonged mastication that occurred anteriorly in oral cavity with regular solids, however, was functional. Pt with reduced lingual bolus control with bolus escaping to floor of oral cavity and posterior loss to the level of the pyriform sinuses. With A-P oral bolus transit, pt with mild lingual pumping and trace residue remaining in oral cavity on lingual surface. Pt's swallow was then initiated with head of bolus at the level of the pyriform sinuses for all consistencies trialed. During the swallow, pt with partial laryngeal elevation as arytenoid approximation to the epiglottic petiole was not complete.  Pt also with partial inversion of epiglottis resulting in incomplete laryngeal vestibular closure evidenced by penetration to the level of the vocal folds that occurred during the swallow with both thin liquids and nectar thick liquids, both of which were inconsistently

## 2023-07-31 NOTE — PROGRESS NOTES
Nephrology Progress Note                                                                                                                                                                                                                                                                                                                                                               Office : [phone]     Fax :[phone]              Patient's Name: Nila Cortes    7/31/2023      Assessment/Plan     1 MICHAEL/CKD   - Possibly obstructive, pre-renal due to possible CHF exacerbation    2. HTN    3. Acute anemia  - GI consulted for GIB  - S/p EGD with duodenal ulcers    4. Acid- base/ Electrolyte imbalance     5. COPD exacerbation     6. Bilateral pleural effusions     7. HFrEF   - EF 15-20%, grade 2 DD, moderate MR, pulmonary HTN    8. Atrial flutter     9. Urinary retention  - S/p complex thomson cath placement 7/19  - Flomax initiated     10. CVA  - New embolic, per MRI 6/74    Plan:  - Cr stable  - Continue PO Lasix   - Coronary angiogram cancelled d/t GIB - to be done outpatient  - BNP 55847----> 6000  - Urine studies - reviewed   - Monitor UOP    Recommend to dose adjust all medications  based on renal functions  Maintain SBP> 90 mmHg   Daily weights   AVOID NSAIDs  Avoid Nephrotoxins  Monitor Intake/Output  Call if significant decrease in urine output       Reason for Consult:  MICHAEL/CKD   Requesting Physician:  No primary care provider on file. Chief Complaint:  Dyspnea    Interval hx:    Tired from working with therapy but otherwise doing OK    Cr improved  BP's controlled      reports that he has been smoking cigarettes. He has been smoking an average of 1 pack per day. He has never used smokeless tobacco. He reports that he does not currently use alcohol. He reports that he does not currently use drugs. Allergies:  Patient has no known allergies.     Current Medications:    calcium carbonate (TUMS) chewable tablet

## 2023-07-31 NOTE — PLAN OF CARE
Problem: Discharge Planning  Goal: Discharge to home or other facility with appropriate resources  7/31/2023 0937 by Boris Gardner RN  Outcome: Progressing  Flowsheets (Taken 7/31/2023 0561)  Discharge to home or other facility with appropriate resources: Identify discharge learning needs (meds, wound care, etc)     Problem: Safety - Adult  Goal: Free from fall injury  7/31/2023 0937 by Boris Gardner RN  Outcome: Progressing     Problem: Skin/Tissue Integrity  Goal: Absence of new skin breakdown  Description: 1. Monitor for areas of redness and/or skin breakdown  2. Assess vascular access sites hourly  3. Every 4-6 hours minimum:  Change oxygen saturation probe site  4. Every 4-6 hours:  If on nasal continuous positive airway pressure, respiratory therapy assess nares and determine need for appliance change or resting period. 7/31/2023 7860 by Boris Gardner RN  Outcome: Progressing     Problem: ABCDS Injury Assessment  Goal: Absence of physical injury  7/31/2023 5580 by Boris Gardner RN  Outcome: Progressing     Problem: Chronic Conditions and Co-morbidities  Goal: Patient's chronic conditions and co-morbidity symptoms are monitored and maintained or improved  7/31/2023 0937 by Boris Gardner RN  Outcome: Progressing  Flowsheets (Taken 7/31/2023 0702)  Care Plan - Patient's Chronic Conditions and Co-Morbidity Symptoms are Monitored and Maintained or Improved: Monitor and assess patient's chronic conditions and comorbid symptoms for stability, deterioration, or improvement     Problem: Nutrition Deficit:  Goal: Optimize nutritional status  Outcome: Progressing  Note: Patient is eating % of meals provided     Problem: Pain  Goal: Verbalizes/displays adequate comfort level or baseline comfort level  Outcome: Progressing  Note: Patient remains free from pain.

## 2023-07-31 NOTE — PROGRESS NOTES
Physical Therapy  Facility/Department: [facility] ACUTE REHAB UNIT  Rehabilitation Physical Therapy Treatment Note    NAME: Johanny Christianson  : 1940 (80 y.o.)  MRN: [id number]  CODE STATUS: Full Code    Date of Service: 23       Restrictions:  Restrictions/Precautions: Fall Risk  Position Activity Restriction  Other position/activity restrictions: up with assist, ambulate the patient     SUBJECTIVE  Subjective  Subjective: pt supine in bed, agreeable to PT  Pain: states no pain. OBJECTIVE  Cognition  Overall Cognitive Status: Exceptions  Arousal/Alertness: Appropriate responses to stimuli  Following Commands: Follows one step commands consistently; Follows multistep commands with increased time  Attention Span: Difficulty dividing attention  Memory: Appears intact  Safety Judgement: Decreased awareness of need for assistance;Decreased awareness of need for safety  Problem Solving: Assistance required to identify errors made;Assistance required to implement solutions  Insights: Decreased awareness of deficits  Initiation: Requires cues for some  Sequencing: Requires cues for some  Cognition Comment: pt demo low frustration tolerance  Orientation  Overall Orientation Status: Within Functional Limits  Orientation Level: Oriented X4    Functional Mobility  Supine to Sit  Assistance Level: Supervision  Skilled Clinical Factors: use of handrails, HOB  Balance  Sitting Balance: Supervision  Standing Balance: Contact guard assistance  Standing Balance  Activity: see activity  Transfers  Surface: From chair with arms; To chair with arms;From bed  Additional Factors: Verbal cues; Hand placement cues; Increased time to complete  Sit to Stand  Assistance Level: Contact guard assist  Skilled Clinical Factors: Multiple sit<>stand from standard chair, EOM, recliner.   Stand to Sit  Assistance Level: Contact guard assist  Skilled Clinical Factors: increased verbal cues for taking the LUE off the RW before descending to Yes

## 2023-07-31 NOTE — PATIENT CARE CONFERENCE
Inpatient Rehabilitation  Weekly Team Conference Note  The [address] [place] [address]  [phone]  Patient Name: Leida Lockhart        MRN: [id number]    : 1940  (80 y.o.)  Gender: male   Referring Practitioner: Calista Hernández DO  Diagnosis: Debility  The team conference for this patient was held on 2023 at 10:30am by:  Calista Hernández DO    Current/Goal QM SCORES  QM Current/Goal Score   Eating CARE Score: 6 / Discharge Goal: Independent   Oral Hygiene CARE Score: 4 / Discharge Goal: Independent   Shower/Bathing CARE Score: 4 / Discharge Goal: Supervision or touching assistance   UB Dressing CARE Score: 3 / Discharge Goal: Independent   LB Dressing CARE Score: 4 / Discharge Goal: Independent   Putting on/off Footwear CARE Score: 5 / Discharge Goal: Independent   Toileting Hygiene CARE Score: 4 / Discharge Goal: Independent   Bladder Continence Bladder Continence: Always continent    Bowel Continence Bowel Continence: Not rated    Toilet Transfers CARE Score: 4 / Discharge Goal: Independent   Shower/Bathe Self  CARE Score: 4 / Discharge Goal: Supervision or touching assistance   Rolling Left and Right CARE Score: 4 / Discharge Goal: Independent   Sit to Lying CARE Score: 4 / Discharge Goal: Independent   Lying to Sitting on Bedside CARE Score: 4 / Discharge Goal: Independent   Sit to Stand CARE Score: 3 / Discharge Goal: Independent   Chair/Bed to Chair Transfer CARE Score: 2 / Discharge Goal: Independent   Car Transfers CARE Score: 3 / Discharge Goal: Independent   Walk 10 Feet CARE Score: 4 / Discharge Goal: Independent   Walk 50 Feet with Two Turns CARE Score: 4 / Discharge Goal: Independent   Walk 150 Feet CARE Score: 3 / Discharge Goal: Independent   Walk 10 Feet on Uneven Surfaces CARE Score: 3 / Discharge Goal: Independent   1 Step (Curb) CARE Score: 3 / Discharge Goal: Independent   4 Steps CARE Score: 3 / Discharge Goal: Independent   12

## 2023-07-31 NOTE — PROGRESS NOTES
Pt in bed, awake watching television. Alert & oriented x 4. VSS. Assessment completed. No complaints at this time. Nighttime medications given except Melatonin and Voltaren gel not applied which was refused by pt. Reminded pt to call for assistance with any needs. Call light within reach. Safety measures in place.

## 2023-07-31 NOTE — PROGRESS NOTES
Supervision;Verbal cues  Skilled Clinical Factors: Pt attempting to complete in stance in shower and required VCs to complete seated on TTB to increase safety and independence. Once seated, pt able to wash/dry BUE, chest and abdomin w/ spvn  Lower Extremity Bathing  Assistance Level: Stand by assist;Increased time to complete;Verbal cues  Skilled Clinical Factors: pt seated on TTB to wash front periarea and BLE and feet w/ spvn. Pt in stance at GB to wash/dry buttocks w/ SBA for standing balance  Upper Extremity Dressing  Assistance Level: Minimal assistance;Verbal cues; Increased time to complete  Skilled Clinical Factors: pt doffed long sleeve button up shirt and t-shirt while seated on TTB w/ increased time and effort. Pt donned t-shirt while seated on TTB w/ setup assist and donned long sleeve t-shirt seated on TTB w/ Min A to move t-shirt behind back. pt leaving long-sleeve button up open like a jacket as this is how he had it on upon arrival  Lower Extremity Dressing  Assistance Level: Contact guard assist;Verbal cues  Skilled Clinical Factors: Pt doffed hospital pants and briefs w/ CGA in stance for clothing mgmt at hips. Pt threaded/removed BLE into/from briefs and hospital pants while seated w/ spvn for VCs to complete seated as pt attempting initially in stance  Putting On/Taking Off Footwear  Assistance Level: Modified independent; Set-up  Skilled Clinical Factors: pt doffed shoes seated in recliner w/ Mod I. Pt donned/doffed hospital socks seated on TTB w/ setup assist and use of figure 4 tech  Toileting  Assistance Level: Contact guard assist  Skilled Clinical Factors: pt continent of bowel seated on BSC over toilet. Pt completed clothing mgmt at hips and rear perihygiene in stance w/ CGA.  Pt incontinent of small amount of bowel during shower- required setup assist of wipes and SBA in stance for perihygiene w/ unilateral UE suport on GB  Toilet Transfers  Technique:  (sit<>stand)  Equipment: Raised toilet

## 2023-07-31 NOTE — PROGRESS NOTES
Alert & oriented x 4, VSS, denies pain, last BM today. O2 95% on room air, lungs clear. Patient did not participate in therapy after lunch due today due to fatigue. Continues to eat % of meals, nutritional supplement started today. Reminded pt to call for assistance with any needs. Call light within reach. Safety measures in place.

## 2023-07-31 NOTE — PROGRESS NOTES
Comprehensive Nutrition Assessment    RECOMMENDATIONS:  PO Diet: Continue regular diet  ONS: Restart Ensure +HP and increase to BID   Nutrition Education: Education not indicated     NUTRITION ASSESSMENT:   Nutritional summary & status: Follow-up. Pt continues on regular diet. Previously received CHF edu w/ no further questions at follow-up. Pt meeting ASPEN criteria for severe malnutrition aeb muscle/fat wasting. Noted pt down 10# since adm, likely 2/2 fluid r/t CHF. ONS previously ordered, but cancelled when NPO and never re-ordered. Will order. Will continue to monitor per [facility]. Admission/PMH: dyspnea, new onset afib and aflutter // heart failure, cardiomyopathy, MICHAEL, HTN    MALNUTRITION ASSESSMENT  Context of Malnutrition: Acute Illness   Malnutrition Status: Severe malnutrition  Findings of the 6 clinical characteristics of malnutrition (Minimum of 2 out of 6 clinical characteristics is required to make the diagnosis of moderate or severe Protein Calorie Malnutrition based on AND/ASPEN Guidelines):  Energy Intake:  No significant decrease in energy intake  Weight Loss:  No significant weight loss     Body Fat Loss: Moderate body fat loss Triceps, Buccal region   Muscle Mass Loss: Moderate muscle mass loss Temples (temporalis), Clavicles (pectoralis & deltoids)    NUTRITION DIAGNOSIS   Severe malnutrition related to inadequate protein-energy intake as evidenced by Criteria as identified in malnutrition assessment    Nutrition Monitoring and Evaluation:   Food/Nutrient Intake Outcomes:  Food and Nutrient Intake, Supplement Intake  Physical Signs/Symptoms Outcomes:  Biochemical Data, Nutrition Focused Physical Findings, Weight, Hemodynamic Status     OBJECTIVE DATA: Significant to nutrition assessment  Nutrition Related Findings: +bm 7/30; no edema; labs reviewed; Wounds: None  Nutrition Goals: PO intake 75% or greater, prior to discharge     [address] DIET;  Regular  PO Intake:

## 2023-07-31 NOTE — PLAN OF CARE
Problem: Discharge Planning  Goal: Discharge to home or other facility with appropriate resources  7/31/2023 0237 by Laura Bravo RN  Outcome: Progressing  Flowsheets (Taken 7/31/2023 3117)  Discharge to home or other facility with appropriate resources:   Identify barriers to discharge with patient and caregiver   Identify discharge learning needs (meds, wound care, etc)     Problem: Safety - Adult  Goal: Free from fall injury  7/31/2023 0237 by Laura Bravo RN  Outcome: Progressing  Flowsheets (Taken 7/31/2023 0237)  Free From Fall Injury: Instruct family/caregiver on patient safety     Problem: Skin/Tissue Integrity  Goal: Absence of new skin breakdown  Description: 1.   Monitor for areas of redness and/or skin breakdown  7/31/2023 0237 by Laura Bravo RN  Outcome: Progressing     Problem: ABCDS Injury Assessment  Goal: Absence of physical injury  Outcome: Progressing  Flowsheets (Taken 7/31/2023 0237)  Absence of Physical Injury: Implement safety measures based on patient assessment     Problem: Chronic Conditions and Co-morbidities  Goal: Patient's chronic conditions and co-morbidity symptoms are monitored and maintained or improved  7/31/2023 0237 by Laura Bravo RN  Flowsheets (Taken 7/31/2023 2190)  Care Plan - Patient's Chronic Conditions and Co-Morbidity Symptoms are Monitored and Maintained or Improved:   Monitor and assess patient's chronic conditions and comorbid symptoms for stability, deterioration, or improvement   Collaborate with multidisciplinary team to address chronic and comorbid conditions and prevent exacerbation or deterioration

## 2023-08-01 PROCEDURE — 92526 ORAL FUNCTION THERAPY: CPT

## 2023-08-01 PROCEDURE — 92507 TX SP LANG VOICE COMM INDIV: CPT

## 2023-08-01 PROCEDURE — 97110 THERAPEUTIC EXERCISES: CPT

## 2023-08-01 PROCEDURE — 97112 NEUROMUSCULAR REEDUCATION: CPT

## 2023-08-01 PROCEDURE — 1280000000 HC REHAB R&B

## 2023-08-01 PROCEDURE — 97129 THER IVNTJ 1ST 15 MIN: CPT

## 2023-08-01 PROCEDURE — 97130 THER IVNTJ EA ADDL 15 MIN: CPT

## 2023-08-01 PROCEDURE — 97530 THERAPEUTIC ACTIVITIES: CPT

## 2023-08-01 PROCEDURE — 6370000000 HC RX 637 (ALT 250 FOR IP): Performed by: PHYSICAL MEDICINE & REHABILITATION

## 2023-08-01 PROCEDURE — 6370000000 HC RX 637 (ALT 250 FOR IP): Performed by: NURSE PRACTITIONER

## 2023-08-01 PROCEDURE — 99232 SBSQ HOSP IP/OBS MODERATE 35: CPT | Performed by: INTERNAL MEDICINE

## 2023-08-01 PROCEDURE — 97116 GAIT TRAINING THERAPY: CPT

## 2023-08-01 RX ADMIN — NYSTATIN: 100000 CREAM TOPICAL at 10:07

## 2023-08-01 RX ADMIN — METOPROLOL SUCCINATE 25 MG: 25 TABLET, EXTENDED RELEASE ORAL at 10:02

## 2023-08-01 RX ADMIN — PANTOPRAZOLE SODIUM 40 MG: 40 TABLET, DELAYED RELEASE ORAL at 10:04

## 2023-08-01 RX ADMIN — ATORVASTATIN CALCIUM 40 MG: 40 TABLET, FILM COATED ORAL at 20:00

## 2023-08-01 RX ADMIN — FUROSEMIDE 20 MG: 20 TABLET ORAL at 10:01

## 2023-08-01 RX ADMIN — FERROUS SULFATE TAB 325 MG (65 MG ELEMENTAL FE) 325 MG: 325 (65 FE) TAB at 10:01

## 2023-08-01 RX ADMIN — APIXABAN 2.5 MG: 5 TABLET, FILM COATED ORAL at 10:02

## 2023-08-01 RX ADMIN — Medication 5 MG: at 20:00

## 2023-08-01 RX ADMIN — DICLOFENAC SODIUM 2 G: 10 GEL TOPICAL at 10:08

## 2023-08-01 RX ADMIN — TAMSULOSIN HYDROCHLORIDE 0.4 MG: 0.4 CAPSULE ORAL at 20:00

## 2023-08-01 RX ADMIN — APIXABAN 2.5 MG: 5 TABLET, FILM COATED ORAL at 20:00

## 2023-08-01 RX ADMIN — PANTOPRAZOLE SODIUM 40 MG: 40 TABLET, DELAYED RELEASE ORAL at 17:31

## 2023-08-01 ASSESSMENT — PAIN SCALES - GENERAL
PAINLEVEL_OUTOF10: 0

## 2023-08-01 NOTE — PROGRESS NOTES
Nephrology Progress Note                                                                                                                                                                                                                                                                                                                                                               Office : [phone]     Fax :[phone]              Patient's Name: Aletha Duncan    8/1/2023      Assessment/Plan     1 MICHAEL/CKD   - Possibly obstructive, pre-renal due to possible CHF exacerbation    2. HTN    3. Acute anemia  - GI consulted for GIB  - S/p EGD with duodenal ulcers    4. Acid- base/ Electrolyte imbalance     5. COPD exacerbation     6. Bilateral pleural effusions     7. HFrEF   - EF 15-20%, grade 2 DD, moderate MR, pulmonary HTN    8. Atrial flutter     9. Urinary retention  - S/p complex thomson cath placement 7/19  - Flomax initiated     10. CVA  - New embolic, per MRI 7/24    Plan:  - Cr stable  - Continue PO Lasix   - Coronary angiogram cancelled d/t GIB - to be done outpatient  - BNP 62592----> 6000  - Urine studies - reviewed   - Monitor UOP    Recommend to dose adjust all medications  based on renal functions  Maintain SBP> 90 mmHg   Daily weights   AVOID NSAIDs  Avoid Nephrotoxins  Monitor Intake/Output  Call if significant decrease in urine output       Reason for Consult:  MICHAEL/CKD   Requesting Physician:  No primary care provider on file. Chief Complaint:  Dyspnea    Interval hx:    Feels well  No complaints  Comfortable on RA    No updated labs today  BP's controlled        reports that he has been smoking cigarettes. He has been smoking an average of 1 pack per day. He has never used smokeless tobacco. He reports that he does not currently use alcohol. He reports that he does not currently use drugs. Allergies:  Patient has no known allergies.     Current Medications:    calcium carbonate (TUMS) chewable

## 2023-08-01 NOTE — PLAN OF CARE
Problem: Discharge Planning  Goal: Discharge to home or other facility with appropriate resources  7/31/2023 2324 by Radha Orellana RN  Outcome: Progressing  7/31/2023 0937 by Silas Abernathy RN  Outcome: Progressing  Flowsheets (Taken 7/31/2023 9030)  Discharge to home or other facility with appropriate resources: Identify discharge learning needs (meds, wound care, etc)     Problem: Safety - Adult  Goal: Free from fall injury  7/31/2023 2324 by Radha Orellana RN  Outcome: Progressing  7/31/2023 0937 by Silas Abernathy RN  Outcome: Progressing     Problem: Skin/Tissue Integrity  Goal: Absence of new skin breakdown  Description: 1. Monitor for areas of redness and/or skin breakdown  2. Assess vascular access sites hourly  3. Every 4-6 hours minimum:  Change oxygen saturation probe site  4. Every 4-6 hours:  If on nasal continuous positive airway pressure, respiratory therapy assess nares and determine need for appliance change or resting period.   7/31/2023 2324 by Radha Orellana RN  Outcome: Progressing  7/31/2023 0937 by Silas Abernathy RN  Outcome: Progressing     Problem: ABCDS Injury Assessment  Goal: Absence of physical injury  7/31/2023 2324 by Radha Orellana RN  Outcome: Progressing  7/31/2023 0937 by Silas Abernathy RN  Outcome: Progressing     Problem: Chronic Conditions and Co-morbidities  Goal: Patient's chronic conditions and co-morbidity symptoms are monitored and maintained or improved  7/31/2023 2324 by Radha Orellana RN  Outcome: Progressing  7/31/2023 0937 by Silas Abernathy RN  Outcome: Progressing  [address] (Taken 7/31/2023 0735)  Care Plan - Patient's Chronic Conditions and Co-Morbidity Symptoms are Monitored and Maintained or Improved: Monitor and assess patient's chronic conditions and comorbid symptoms for stability, deterioration, or improvement     Problem: Nutrition Deficit:  Goal: Optimize nutritional status  7/31/2023 2324 by Radha Orellana RN  Outcome: Progressing  7/31/2023 0937 by Michelle Johnson

## 2023-08-01 NOTE — PLAN OF CARE
Problem: Discharge Planning  Goal: Discharge to home or other facility with appropriate resources  Outcome: Progressing  Flowsheets (Taken 8/1/2023 0945)  Discharge to home or other facility with appropriate resources: Identify discharge learning needs (meds, wound care, etc)     Problem: Safety - Adult  Goal: Free from fall injury  Outcome: Progressing  Patient free from falls this shift. Problem: Skin/Tissue Integrity  Goal: Absence of new skin breakdown  Description: 1. Monitor for areas of redness and/or skin breakdown  2. Assess vascular access sites hourly  3. Every 4-6 hours minimum:  Change oxygen saturation probe site  4. Every 4-6 hours:  If on nasal continuous positive airway pressure, respiratory therapy assess nares and determine need for appliance change or resting period. Outcome: Progressing  No new skin issues.      Problem: ABCDS Injury Assessment  Goal: Absence of physical injury  Outcome: Progressing     Problem: Chronic Conditions and Co-morbidities  Goal: Patient's chronic conditions and co-morbidity symptoms are monitored and maintained or improved  Outcome: Progressing  Flowsheets (Taken 8/1/2023 0945)  Care Plan - Patient's Chronic Conditions and Co-Morbidity Symptoms are Monitored and Maintained or Improved: Monitor and assess patient's chronic conditions and comorbid symptoms for stability, deterioration, or improvement     Problem: Nutrition Deficit:  Goal: Optimize nutritional status  Outcome: Progressing     Problem: Pain  Goal: Verbalizes/displays adequate comfort level or baseline comfort level  Outcome: Progressing

## 2023-08-01 NOTE — PROGRESS NOTES
LUE. Pt completing mobility with SBA without AD. Pt will have no support at home and will need to be independent. Pt is functioning below baseline and benefits from ongoing skilled OT. Cont per POC. Activity Tolerance: Patient tolerated treatment well  Discharge Recommendations: Home with Home health OT;24 hour supervision or assist;Continue to assess pending progress  OT Equipment Recommendations  Equipment Needed: Yes  ADL Assistive Devices: Transfer Tub Bench;Grab Bars - tub; Toileting - Raised Toilet Seat with arms  Safety Devices  Safety Devices in place: Yes  Type of devices: Call light within reach; Chair alarm in place; Left in chair    Patient Education  Education  Education Given To: Patient  Education Provided: Role of Therapy;Plan of Care;Transfer Training;Mobility Training; Safety;ADL Function  Education Provided Comments: HEP, recommended assist at dc for IADLs such as grocery shopping, stroke recovery, recommendation of COA/MOW  Education Method: Verbal  Barriers to Learning: Hearing  Education Outcome: Verbalized understanding;Demonstrated understanding    Plan  Occupational Therapy Plan  Times Per Week: 5x a week for 60mins daily  Current Treatment Recommendations: Balance training;Self-Care / ADL; Safety education & training;Functional mobility training;Strengthening; Endurance training;Patient/Caregiver education & training;ROM; Coordination training;Home management training; Wheelchair mobility training;Gait training;Stair training;Neuromuscular re-education;Positioning;Equipment evaluation, education, & procurement    Goals  Patient Goals   Patient goals : get stronger  Short Term Goals  Time Frame for Short Term Goals: 2 weeks  Short Term Goal 1: Pt will complete LE dressing MOD I- ongoing  Short Term Goal 2: Pt will complete toileting and toilet transfers MOD I- ongoing  Short Term Goal 3: Pt will complete bathing tasks w/ spvn and use of TTB for fall prevention- goal met for UE bathing 7/31, cont

## 2023-08-01 NOTE — PROGRESS NOTES
Physical Therapy  Facility/Department: [facility] ACUTE REHAB UNIT  Rehabilitation Physical Therapy Treatment Note    NAME: Neo Pan  : 1940 (80 y.o.)  MRN: [id number]  CODE STATUS: Full Code    Date of Service: 23       Restrictions:  Restrictions/Precautions: Fall Risk  Position Activity Restriction  Other position/activity restrictions: up with assist, ambulate the patient     SUBJECTIVE  Subjective  Subjective: Pt supine in bed, agreeable to PT  Pain: states no pain. OBJECTIVE  Orientation  Overall Orientation Status: Within Functional Limits  Orientation Level: Oriented X4    Functional Mobility  Sit to Supine  Assistance Level: Stand by assist  Skilled Clinical Factors: to mat table  Supine to Sit  Assistance Level: Supervision  Skilled Clinical Factors: use of handrails, HOB raised  Scooting  Assistance Level: Independent  Balance  Sitting Balance: Supervision  Standing Balance: Contact guard assistance  Standing Balance  Activity: see activity log  Transfers  Surface: From bed; To chair with arms;From chair with arms  Additional Factors: Verbal cues; Hand placement cues; Increased time to complete  Sit to Stand  Assistance Level: Contact guard assist  Skilled Clinical Factors: Multiple sit<>stand from EOB, standard chair, EOM, recliner. Stand to Sit  Assistance Level: Contact guard assist  Skilled Clinical Factors: decreased verbal cues for hand placement to descend to the chair      Environmental Mobility  Ambulation  Surface: Level surface  Device:  (no device)  Distance: 300ft+150 ft resistive ambulation  Activity: Within Unit  Additional Factors: Verbal cues  Assistance Level: Contact guard assist;Stand by assist  Gait Deviations: Narrow base of support;Path deviations; Unsteady gait  Skilled Clinical Factors: SBA-CGA with no device, increased hoang, when coughing would loose balance, increased right stance phase with large step lengths             PT Exercises  Exercise Treatment:

## 2023-08-01 NOTE — PROGRESS NOTES
[employer] representative here and assisted patient with [employer] life Vest application. Representative educated patient and staff on how to set up and use the device as well as cleaning and troubleshooting. Information packet at bedside.

## 2023-08-01 NOTE — PROGRESS NOTES
swallowing has improved and has not had any coughing. Goal not targeted this session. Goal not targeted this session. GOAL MET   Pt will recall/utilize speech intelligibility strategies given min cues. Goal not targeted this session. Goal not targeted this session. Implemented with 89% of opportunities with min cues. PROGRESSING TOWARDS GOAL- CONTINUE   Pt will increase speech intelligibility to 80% in basic conversational exchanges. Goal not targeted this session. Goal not targeted this session. See above. PROGRESSING TOWARDS GOAL- CONTINUE   Pt will demonstrate improved attention to tasks/task specific details with <3 redirections/repetitions. Goal not targeted this session. Goal not targeted this session. Adequate attention to details this session. PROGRESSING TOWARDS GOAL- CONTINUE   Pt will complete executive function tasks with 90% accuracy given min cues. Goal not targeted this session. Attempted 6-step picture sequence: terminated task as pt stated \"This has nothing to do with me\" and decreased comprehension of the rationale for the tx task. Medication organization: completed with 100% accuracy independently with AM/PM pill organizer. Drawing conclusions: completed with 63% accuracy independently. PROGRESSING TOWARDS GOAL- CONTINUE   Pt will utilize compensatory strategies to recall x3/3 pieces of functional information given min cues. Goal not targeted this session. Targeted via word pair recall: completed with 50% accuracy independently, increased to 90% accuracy given min cues. Goal not targeted this session. PROGRESSING TOWARDS GOAL- CONTINUE   Pt will participate in ongoing cognitive-linguistic assessment. Goal not targeted this session. Goal not targeted this session. Goal not targeted this session. GOAL MET- CONTINUE AS NEEDED   Other areas targeted:       Education:   Educated to rationale for tx session and skills targeted.  See above for education for

## 2023-08-01 NOTE — CARE COORDINATION
SW working with patient's Niece Tyra Parrish ([phone]) to establish what help will be available at discharge. Patient needing life vest at discharge per cardiology. SW to work with family on education for patient maintaining life vest and batteries at home.      Electronically signed by IRINEO Chavez on 8/1/2023 at 3:55 PM

## 2023-08-01 NOTE — PROGRESS NOTES
Upon entry into room, patient refusing to wear LifeVest. Patient has been educated on the reasons for the device, still refusing to wear it. Dr Iain Davies has been notified through perfect [employer].

## 2023-08-01 NOTE — PROGRESS NOTES
Patient adamant about not wearing the life vest. He states it is uncomfortable and hot. Education provided. Patient undeterred and wanted it off. Removed life vest at this time. Night shift RN notified Dr. Gala Napier via [employer].     Juwan Maddox NP is the ordering provider and needs to me notified in AM.

## 2023-08-02 LAB
ANION GAP SERPL CALCULATED.3IONS-SCNC: 5 MMOL/L (ref 3–16)
BASOPHILS # BLD: 0.1 K/UL (ref 0–0.2)
BASOPHILS NFR BLD: 1 %
BUN SERPL-MCNC: 27 MG/DL (ref 7–20)
CALCIUM SERPL-MCNC: 9 MG/DL (ref 8.3–10.6)
CHLORIDE SERPL-SCNC: 106 MMOL/L (ref 99–110)
CO2 SERPL-SCNC: 29 MMOL/L (ref 21–32)
CREAT SERPL-MCNC: 1.6 MG/DL (ref 0.8–1.3)
DEPRECATED RDW RBC AUTO: 16.2 % (ref 12.4–15.4)
EOSINOPHIL # BLD: 0.2 K/UL (ref 0–0.6)
EOSINOPHIL NFR BLD: 3.9 %
GFR SERPLBLD CREATININE-BSD FMLA CKD-EPI: 43 ML/MIN/{1.73_M2}
GLUCOSE SERPL-MCNC: 88 MG/DL (ref 70–99)
HCT VFR BLD AUTO: 22.8 % (ref 40.5–52.5)
HGB BLD-MCNC: 7.7 G/DL (ref 13.5–17.5)
LYMPHOCYTES # BLD: 1.1 K/UL (ref 1–5.1)
LYMPHOCYTES NFR BLD: 18.1 %
MCH RBC QN AUTO: 31.5 PG (ref 26–34)
MCHC RBC AUTO-ENTMCNC: 33.9 G/DL (ref 31–36)
MCV RBC AUTO: 92.9 FL (ref 80–100)
MONOCYTES # BLD: 0.6 K/UL (ref 0–1.3)
MONOCYTES NFR BLD: 9.5 %
NEUTROPHILS # BLD: 4.1 K/UL (ref 1.7–7.7)
NEUTROPHILS NFR BLD: 67.5 %
PLATELET # BLD AUTO: 331 K/UL (ref 135–450)
PMV BLD AUTO: 7.3 FL (ref 5–10.5)
POTASSIUM SERPL-SCNC: 4.4 MMOL/L (ref 3.5–5.1)
RBC # BLD AUTO: 2.45 M/UL (ref 4.2–5.9)
SODIUM SERPL-SCNC: 140 MMOL/L (ref 136–145)
WBC # BLD AUTO: 6.1 K/UL (ref 4–11)

## 2023-08-02 PROCEDURE — 97530 THERAPEUTIC ACTIVITIES: CPT

## 2023-08-02 PROCEDURE — 85025 COMPLETE CBC W/AUTO DIFF WBC: CPT

## 2023-08-02 PROCEDURE — 97130 THER IVNTJ EA ADDL 15 MIN: CPT

## 2023-08-02 PROCEDURE — 6370000000 HC RX 637 (ALT 250 FOR IP): Performed by: NURSE PRACTITIONER

## 2023-08-02 PROCEDURE — 36415 COLL VENOUS BLD VENIPUNCTURE: CPT

## 2023-08-02 PROCEDURE — 92507 TX SP LANG VOICE COMM INDIV: CPT

## 2023-08-02 PROCEDURE — 1280000000 HC REHAB R&B

## 2023-08-02 PROCEDURE — 6370000000 HC RX 637 (ALT 250 FOR IP): Performed by: PHYSICAL MEDICINE & REHABILITATION

## 2023-08-02 PROCEDURE — 97116 GAIT TRAINING THERAPY: CPT

## 2023-08-02 PROCEDURE — 99232 SBSQ HOSP IP/OBS MODERATE 35: CPT | Performed by: INTERNAL MEDICINE

## 2023-08-02 PROCEDURE — 97129 THER IVNTJ 1ST 15 MIN: CPT

## 2023-08-02 PROCEDURE — 80048 BASIC METABOLIC PNL TOTAL CA: CPT

## 2023-08-02 RX ADMIN — APIXABAN 2.5 MG: 5 TABLET, FILM COATED ORAL at 08:27

## 2023-08-02 RX ADMIN — PANTOPRAZOLE SODIUM 40 MG: 40 TABLET, DELAYED RELEASE ORAL at 16:17

## 2023-08-02 RX ADMIN — FERROUS SULFATE TAB 325 MG (65 MG ELEMENTAL FE) 325 MG: 325 (65 FE) TAB at 08:28

## 2023-08-02 RX ADMIN — TRAZODONE HYDROCHLORIDE 50 MG: 50 TABLET ORAL at 20:47

## 2023-08-02 RX ADMIN — NYSTATIN: 100000 CREAM TOPICAL at 20:49

## 2023-08-02 RX ADMIN — NYSTATIN: 100000 CREAM TOPICAL at 08:36

## 2023-08-02 RX ADMIN — APIXABAN 2.5 MG: 5 TABLET, FILM COATED ORAL at 20:47

## 2023-08-02 RX ADMIN — FUROSEMIDE 20 MG: 20 TABLET ORAL at 08:28

## 2023-08-02 RX ADMIN — Medication 5 MG: at 20:47

## 2023-08-02 RX ADMIN — TAMSULOSIN HYDROCHLORIDE 0.4 MG: 0.4 CAPSULE ORAL at 20:48

## 2023-08-02 RX ADMIN — PANTOPRAZOLE SODIUM 40 MG: 40 TABLET, DELAYED RELEASE ORAL at 05:54

## 2023-08-02 RX ADMIN — METOPROLOL SUCCINATE 25 MG: 25 TABLET, EXTENDED RELEASE ORAL at 08:28

## 2023-08-02 RX ADMIN — ATORVASTATIN CALCIUM 40 MG: 40 TABLET, FILM COATED ORAL at 20:47

## 2023-08-02 RX ADMIN — ACETAMINOPHEN 650 MG: 325 TABLET ORAL at 20:47

## 2023-08-02 ASSESSMENT — PAIN DESCRIPTION - LOCATION: LOCATION: HEAD

## 2023-08-02 ASSESSMENT — PAIN SCALES - GENERAL
PAINLEVEL_OUTOF10: 0
PAINLEVEL_OUTOF10: 3
PAINLEVEL_OUTOF10: 0

## 2023-08-02 ASSESSMENT — PAIN DESCRIPTION - ORIENTATION: ORIENTATION: RIGHT;LEFT;ANTERIOR

## 2023-08-02 ASSESSMENT — PAIN - FUNCTIONAL ASSESSMENT
PAIN_FUNCTIONAL_ASSESSMENT: ACTIVITIES ARE NOT PREVENTED
PAIN_FUNCTIONAL_ASSESSMENT: 0-10
PAIN_FUNCTIONAL_ASSESSMENT: 0-10

## 2023-08-02 ASSESSMENT — PAIN DESCRIPTION - DESCRIPTORS: DESCRIPTORS: ACHING;DISCOMFORT

## 2023-08-02 NOTE — PROGRESS NOTES
making:  Discussion of patient care with other providers  Reviewed clinical lab tests  Reviewed radiology tests  Reviewed other diagnostic tests/interventions  Independent review of radiologic images - reviewed       Ashwini De La Fuente PA-C   [facility]       I have seen the patient independently from the PA/NP . I discussed the care with the PA/NP . I personally reviewed the HPI, PH, FH, SH, ROS and medications. I repeated pertinent portions of the examination and reviewed the relevant imaging and laboratory data.  I agree with the findings, assessment and plan as documented, with the following addendum:        Kyle Lu MD

## 2023-08-02 NOTE — PROGRESS NOTES
Patient was educated on Life Vest with nurse and Therapy present. Patient refused to wear Life Vest. Patient states vest is uncomfortable.  Patient voiced understanding of risks of not wearing Life Vest.

## 2023-08-02 NOTE — PROGRESS NOTES
[facility]   Electrophysiology  Office Visit  Date: 8/30/2023    Chief Complaint   Patient presents with    Cardiomyopathy    Congestive Heart Failure    Atrial Fibrillation    Tachycardia    Bradycardia       Cardiac HX: Erika Wang is a 80 y.o. man with a h/o HTN, GIB, CKD stage 3, CVA,  PAH, ICMP, EF 15-20% (7/13/2023), pAF on Eliquis 2.5 mg BID (age, Cr), 4 week CAM (7-8/2023) showed multiple episodes of AT - longest 28 beats, no AF. Interval History/HPI: Patient is here for follow-up for paroxysmal atrial fibrillation and new onset cardiomyopathy. Patient had presented on 7/12/2023 with progressively worsening shortness of breath upon exertion over the previous week. He had noticed some changes over the previous year however symptoms had significantly worsened within the past week. Symptoms were worse with exertion. Patient was noted to be in atrial fibrillation with a heart rate of 105 bpm.  His BNP on admission was greater than 33,000, his high-sensitivity troponin was 410. Chest x-ray showed mild to moderate pulmonary edema and small bilateral pleural effusions. Had an echo that showed his EF was 15 to 20%. He was diuresed and discharged on 20 mg of furosemide daily. He is not exactly sure of his medications. He presents in normal sinus rhythm. He did wear a 4-week CAM that is in 3 he reports. It showed greater than 2000 episodes of atrial tachycardia with the longest lasting 28 beats in length along with 3 nonsustained VT episodes with the longest being 3 beats in length. There was no atrial fibrillation on the monitor. Patient has been asymptomatic with palpitations or heart racing. He states that he does not feel any different than he has all of his life except for more fatigue. He does state that he lives [place] and this is a long way for him to come. Remains on Eliquis 2.5 mg twice a day with no issues with bleeding or dark tarry stools.   He denies chest pain,

## 2023-08-02 NOTE — PROGRESS NOTES
Occupational Therapy  Facility/Department: [facility] ACUTE REHAB UNIT  Rehabilitation Occupational Therapy Daily Treatment Note    Date: 23  Patient Name: Nila Cortes       Room: [facility]3103-01  MRN: [id number]  Account: [de-identified]   : 1940  (80 y.o.) Gender: male                    Past Medical History:  has a past medical history of CHF (congestive heart failure) ([address]) and Smoker. Past Surgical History:   has a past surgical history that includes Upper gastrointestinal endoscopy (N/A, 2023) and Upper gastrointestinal endoscopy (N/A, 2023). Restrictions  Restrictions/Precautions: Fall Risk  Other position/activity restrictions: up with assist, ambulate the patient    Subjective  Subjective: Pt seated in chair upon arrival, initially hesitant to participate in OT session reporting, \"I'm just having a blue moment\" but with encouragment pt agreeable to participate. Pt denied pain. Restrictions/Precautions: Fall Risk             Objective     Cognition  Overall Cognitive Status: WFL  Orientation  Overall Orientation Status: Within Functional Limits  Orientation Level: Oriented X4         ADL  Tub/Shower Transfers  Type: Tub  Transfer From:  (no AD)  Transfer To: Tub transfer bench  Assistance Level: Stand by assist  Skilled Clinical Factors: pt completed dry tub transfer using TTB after being provided step by step VCs and demonstration; pt performed with sit<>swivel technique and no difficulty managing BLEs in/out of tub using TTB; pt expressed interest in aquiring a TTB and is hopeful that his niece can assist    Instrumental ADL's  Instrumental ADLs: Yes  Commmunity Re-entry  Community Re-Entry Level:  (no AD)  Community Re-entry Level of Assistance: Contact guard assistance;Stand by assistance  Community Re-Entry:  In order to improve functional endurance and prepare pt for grocery shopping as pt was responsible at baseline, pt was instructed to ambulate to gift shop and complete various

## 2023-08-02 NOTE — PROGRESS NOTES
decision making:  Discussion of patient care with other providers  Reviewed clinical lab tests  Reviewed radiology tests  Reviewed other diagnostic tests/interventions  Independent review of radiologic images - reviewed       Kelsea Kay PA-C   [facility]

## 2023-08-02 NOTE — PLAN OF CARE
Problem: Discharge Planning  Goal: Discharge to home or other facility with appropriate resources  8/1/2023 2310 by Nickie Cuenca RN  Outcome: Progressing  8/1/2023 1942 by Guera Hernandez RN  Outcome: Progressing  Flowsheets (Taken 8/1/2023 0945)  Discharge to home or other facility with appropriate resources: Identify discharge learning needs (meds, wound care, etc)     Problem: Safety - Adult  Goal: Free from fall injury  8/1/2023 2310 by Nickie Cuenca RN  Outcome: Progressing  Flowsheets (Taken 8/1/2023 2309)  Free From Fall Injury: Instruct family/caregiver on patient safety  8/1/2023 1942 by Guera Hernandez RN  Outcome: Progressing     Problem: Skin/Tissue Integrity  Goal: Absence of new skin breakdown  Description: 1. Monitor for areas of redness and/or skin breakdown  2. Assess vascular access sites hourly  3. Every 4-6 hours minimum:  Change oxygen saturation probe site  4. Every 4-6 hours:  If on nasal continuous positive airway pressure, respiratory therapy assess nares and determine need for appliance change or resting period.   8/1/2023 2310 by Nickie Cuenca RN  Outcome: Progressing  8/1/2023 1942 by Guera Hernandez RN  Outcome: Progressing     Problem: ABCDS Injury Assessment  Goal: Absence of physical injury  8/1/2023 2310 by Nickie Cuenca RN  Outcome: Progressing  8/1/2023 1942 by Guera Hernandez RN  Outcome: Progressing     Problem: Chronic Conditions and Co-morbidities  Goal: Patient's chronic conditions and co-morbidity symptoms are monitored and maintained or improved  8/1/2023 2310 by Nickie Cuenca RN  Outcome: Progressing  8/1/2023 1942 by Guera Hernandez RN  Outcome: Progressing  Flowsheets (Taken 8/1/2023 0945)  Care Plan - Patient's Chronic Conditions and Co-Morbidity Symptoms are Monitored and Maintained or Improved: Monitor and assess patient's chronic conditions and comorbid symptoms for stability, deterioration, or improvement     Problem: Nutrition Deficit:  Goal: Optimize nutritional status  8/1/2023 2310

## 2023-08-02 NOTE — PROGRESS NOTES
Physical Therapy  Facility/Department: [facility] ACUTE REHAB UNIT  Rehabilitation Physical Therapy Treatment Note    NAME: Eyad Chadwick  : 1940 (80 y.o.)  MRN: [id number]  CODE STATUS: Full Code    Date of Service: 23       Restrictions:  Restrictions/Precautions: Fall Risk  Position Activity Restriction  Other position/activity restrictions: up with assist, ambulate the patient     SUBJECTIVE  Subjective  Subjective: Pt supine in bed, agreeable to PT. Pt continueing to refuse wearing life vest \"it's uncomfortable\" \"there's no controlling life and death\" despite extended co-education from PT/RN at beginning of session on benefits of wearing life vest  Pain: denies pain     OBJECTIVE  Cognition  Overall Cognitive Status: WFL  Arousal/Alertness: Appropriate responses to stimuli  Following Commands: Follows one step commands consistently; Follows multistep commands with increased time  Attention Span: Difficulty dividing attention; Attends with cues to redirect  Memory: Appears intact  Safety Judgement: Decreased awareness of need for assistance;Decreased awareness of need for safety  Problem Solving: Assistance required to identify errors made;Assistance required to implement solutions  Insights: Decreased awareness of deficits  Initiation: Requires cues for some  Sequencing: Requires cues for some    Functional Mobility  Supine to Sit  Assistance Level: Modified independent  Skilled Clinical Factors: use of handrails, HOB raised  Transfers  Surface: From bed; To chair with arms;From chair with arms  Additional Factors: Verbal cues; Hand placement cues; Increased time to complete  Sit to Stand  Assistance Level: Contact guard assist;Stand by assist (CGA>SBA)  Skilled Clinical Factors: VC for hand placement  Stand to Sit  Assistance Level: Contact guard assist;Stand by assist (CGA>SBA)  Skilled Clinical Factors: VC for hand placement, safe positioning prior to sitting, and controlled eccentric

## 2023-08-03 PROCEDURE — 97129 THER IVNTJ 1ST 15 MIN: CPT

## 2023-08-03 PROCEDURE — 6370000000 HC RX 637 (ALT 250 FOR IP): Performed by: PHYSICAL MEDICINE & REHABILITATION

## 2023-08-03 PROCEDURE — 97130 THER IVNTJ EA ADDL 15 MIN: CPT

## 2023-08-03 PROCEDURE — 92507 TX SP LANG VOICE COMM INDIV: CPT

## 2023-08-03 PROCEDURE — 6370000000 HC RX 637 (ALT 250 FOR IP): Performed by: NURSE PRACTITIONER

## 2023-08-03 PROCEDURE — 1280000000 HC REHAB R&B

## 2023-08-03 PROCEDURE — 97530 THERAPEUTIC ACTIVITIES: CPT

## 2023-08-03 PROCEDURE — 97116 GAIT TRAINING THERAPY: CPT

## 2023-08-03 PROCEDURE — 97112 NEUROMUSCULAR REEDUCATION: CPT

## 2023-08-03 PROCEDURE — 99232 SBSQ HOSP IP/OBS MODERATE 35: CPT | Performed by: INTERNAL MEDICINE

## 2023-08-03 PROCEDURE — 97535 SELF CARE MNGMENT TRAINING: CPT

## 2023-08-03 RX ORDER — TAMSULOSIN HYDROCHLORIDE 0.4 MG/1
0.4 CAPSULE ORAL NIGHTLY
Qty: 30 CAPSULE | Refills: 3 | Status: SHIPPED | OUTPATIENT
Start: 2023-08-03

## 2023-08-03 RX ORDER — TRAZODONE HYDROCHLORIDE 50 MG/1
50 TABLET ORAL NIGHTLY PRN
Qty: 60 TABLET | Refills: 1 | Status: SHIPPED | OUTPATIENT
Start: 2023-08-03

## 2023-08-03 RX ORDER — METOPROLOL SUCCINATE 25 MG/1
25 TABLET, EXTENDED RELEASE ORAL DAILY
Qty: 30 TABLET | Refills: 3 | Status: SHIPPED | OUTPATIENT
Start: 2023-08-04

## 2023-08-03 RX ORDER — POLYETHYLENE GLYCOL 3350 17 G/17G
17 POWDER, FOR SOLUTION ORAL DAILY PRN
Qty: 527 G | Refills: 1 | Status: SHIPPED | OUTPATIENT
Start: 2023-08-03 | End: 2023-10-04

## 2023-08-03 RX ORDER — ATORVASTATIN CALCIUM 40 MG/1
40 TABLET, FILM COATED ORAL NIGHTLY
Qty: 30 TABLET | Refills: 3 | Status: SHIPPED | OUTPATIENT
Start: 2023-08-03

## 2023-08-03 RX ORDER — PANTOPRAZOLE SODIUM 40 MG/1
40 TABLET, DELAYED RELEASE ORAL
Qty: 30 TABLET | Refills: 3 | Status: SHIPPED | OUTPATIENT
Start: 2023-08-03

## 2023-08-03 RX ORDER — FUROSEMIDE 20 MG/1
20 TABLET ORAL DAILY
Qty: 60 TABLET | Refills: 3 | Status: SHIPPED | OUTPATIENT
Start: 2023-08-03

## 2023-08-03 RX ORDER — FERROUS SULFATE 325(65) MG
325 TABLET ORAL
Qty: 60 TABLET | Refills: 0 | Status: SHIPPED | OUTPATIENT
Start: 2023-08-03

## 2023-08-03 RX ADMIN — PANTOPRAZOLE SODIUM 40 MG: 40 TABLET, DELAYED RELEASE ORAL at 16:16

## 2023-08-03 RX ADMIN — PANTOPRAZOLE SODIUM 40 MG: 40 TABLET, DELAYED RELEASE ORAL at 05:52

## 2023-08-03 RX ADMIN — Medication 5 MG: at 21:33

## 2023-08-03 RX ADMIN — METOPROLOL SUCCINATE 25 MG: 25 TABLET, EXTENDED RELEASE ORAL at 08:17

## 2023-08-03 RX ADMIN — APIXABAN 2.5 MG: 5 TABLET, FILM COATED ORAL at 21:32

## 2023-08-03 RX ADMIN — FERROUS SULFATE TAB 325 MG (65 MG ELEMENTAL FE) 325 MG: 325 (65 FE) TAB at 08:17

## 2023-08-03 RX ADMIN — APIXABAN 2.5 MG: 5 TABLET, FILM COATED ORAL at 08:17

## 2023-08-03 RX ADMIN — NYSTATIN: 100000 CREAM TOPICAL at 08:18

## 2023-08-03 RX ADMIN — FUROSEMIDE 20 MG: 20 TABLET ORAL at 08:17

## 2023-08-03 RX ADMIN — BISACODYL 5 MG: 5 TABLET, COATED ORAL at 08:17

## 2023-08-03 RX ADMIN — TAMSULOSIN HYDROCHLORIDE 0.4 MG: 0.4 CAPSULE ORAL at 21:33

## 2023-08-03 RX ADMIN — ATORVASTATIN CALCIUM 40 MG: 40 TABLET, FILM COATED ORAL at 21:33

## 2023-08-03 ASSESSMENT — PAIN SCALES - GENERAL: PAINLEVEL_OUTOF10: 0

## 2023-08-03 NOTE — PLAN OF CARE
Problem: Discharge Planning  Goal: Discharge to home or other facility with appropriate resources  8/3/2023 1013 by Anisha Nixon RN  Outcome: Progressing     Problem: Safety - Adult  Goal: Free from fall injury  8/3/2023 1013 by Anisha Nixon RN  Outcome: Progressing  Flowsheets (Taken 8/3/2023 1010)  Free From Fall Injury: Instruct family/caregiver on patient safety     Problem: Skin/Tissue Integrity  Goal: Absence of new skin breakdown  8/3/2023 1013 by Anisha Nixon, RN  Outcome: Progressing

## 2023-08-03 NOTE — CARE COORDINATION
FANTASMA spoke with patient's family (Driss Adame, [phone]) and discussed the anticipated needs for discharge planning. FANTASMA updated family that patient is refusing to wear the life vest and doesn't want to discharge home with one. FANTASMA sent referrals to [facility] for [address] services at discharge. [facility] following. Patient and Robin Adame went over needed equipment for patient at home. Robin Adame has purchased tub transfer bench for patient to use at home. FANTASMA following and will coordinate with [facility] once MD puts orders in.     FANTASMA sent fast track referral to [address] [facility] and [address]. Contact Lisa Bradford - [phone], Margaret Common to speak with patient at bedside tomorrow or post discharge.      Electronically signed by IRINEO Gomez on 8/3/2023 at 3:27 PM

## 2023-08-03 NOTE — PROGRESS NOTES
Shift assessment completed, VSS, Patient A/Ox4, C/O 3/10 headache pain PRN Tylenol given. Patient is continuing to refuse the life vest, ZIO patch in place. Safety measures in place, call light and overbed table within reach, hourly rounding and visual checks in place, will continue to monitor.

## 2023-08-03 NOTE — PROGRESS NOTES
ACUTE REHAB UNIT  SPEECH/LANGUAGE PATHOLOGY      [x] Daily  [] Weekly Care Conference Note  [x] Discharge    Patient:Axel Blankenship      :1940  ONJ:[id number]  Rehab Dx/Hx: Nan Mac [R53.81]    Precautions: [x] Aspiration  [x] Fall risk  [] Sternal  [] Seizure [] Hip  [] Weight Bearing [] Other     ST Dx: [] Aphasia  [x] Dysarthria  [] Apraxia   [x] Oropharyngeal dysphagia [x] Cognitive Impairment  [] Other:   Date of Admit: 2023  Room #: 3103/3103-01  Date: 8/3/2023          Current Diet Order:ADULT DIET; Regular  ADULT ORAL NUTRITION SUPPLEMENT; Lunch, Breakfast; Standard High Calorie/High Protein Oral Supplement      Compensatory Swallowing Strategies : Alternate solids and liquids, Eat/Feed slowly, Upright as possible for all oral intake, No straws, Effortful swallow, Swallow 2 times per bite/sip, Small bites/sips     Oropharyngeal Phase Impressions: Pt presents with mild to moderate oropharyngeal phase dysphagia. Pt with positive oral acceptance of all PO trials that were self-fed or administered by SLP. Oral phase is characterized by complete labial closure with spoon used to present trials. Due to dentition, pt with prolonged mastication that occurred anteriorly in oral cavity with regular solids, however, was functional. Pt with reduced lingual bolus control with bolus escaping to floor of oral cavity and posterior loss to the level of the pyriform sinuses. With A-P oral bolus transit, pt with mild lingual pumping and trace residue remaining in oral cavity on lingual surface. Pt's swallow was then initiated with head of bolus at the level of the pyriform sinuses for all consistencies trialed. During the swallow, pt with partial laryngeal elevation as arytenoid approximation to the epiglottic petiole was not complete.  Pt also with partial inversion of epiglottis resulting in incomplete laryngeal vestibular closure evidenced by penetration to the level of the vocal folds that occurred during the

## 2023-08-03 NOTE — PROGRESS NOTES
A&O.  Denies pain this am.  Attending therapy as ordered. Assessment complete. Safety measures in place. Denies any further needs.

## 2023-08-03 NOTE — PROGRESS NOTES
Occupational Therapy  Facility/Department: [facility] UNIT  Rehabilitation Occupational Therapy Daily Treatment Note & DC     Date: 8/3/23  Patient Name: Nila Cortes       Room: 3103/3103-01  MRN: [id number]  Account: [de-identified]   : 1940  (80 y.o.) Gender: male                    Past Medical History:  has a past medical history of CHF (congestive heart failure) ([address]) and Smoker. Past Surgical History:   has a past surgical history that includes Upper gastrointestinal endoscopy (N/A, 2023) and Upper gastrointestinal endoscopy (N/A, 2023). Restrictions  Restrictions/Precautions: Fall Risk  Other position/activity restrictions: up with assist, ambulate the patient    Subjective  Subjective: In chair agreeable to session, reporting no pain, seeming to be in better spirits today                Objective     Cognition  Overall Cognitive Status: WFL  Arousal/Alertness: Appropriate responses to stimuli  Following Commands: Follows one step commands consistently; Follows multistep commands with increased time  Attention Span: Difficulty dividing attention; Attends with cues to redirect  Memory: Appears intact  Safety Judgement: Decreased awareness of need for assistance;Decreased awareness of need for safety  Insights: Decreased awareness of deficits  Initiation: Requires cues for some  Sequencing: Requires cues for some  Orientation  Overall Orientation Status: Within Functional Limits  Orientation Level: Oriented X4         ADL  Feeding  Assistance Level:  Independent  Grooming/Oral Hygiene  Assistance Level: setup  Skilled Clinical Factors: stance at sink oral care  Upper Extremity Bathing  Assistance Level: Supervision;Verbal cues  Skilled Clinical Factors: seated TTB  Lower Extremity Bathing  Assistance Level: Supervision  Skilled Clinical Factors: seated TTB for LE/feet, stance for periarea  Upper Extremity Dressing  Assistance Level: Modified independent  Skilled Clinical Factors: seated How Severe Are Your Warts?: moderate Is This A New Presentation, Or A Follow-Up?: Warts

## 2023-08-03 NOTE — PLAN OF CARE
Problem: Discharge Planning  Goal: Discharge to home or other facility with appropriate resources  Outcome: Progressing     Problem: Safety - Adult  Goal: Free from fall injury  Outcome: Progressing     Problem: Skin/Tissue Integrity  Goal: Absence of new skin breakdown  Description: 1. Monitor for areas of redness and/or skin breakdown  2. Assess vascular access sites hourly  3. Every 4-6 hours minimum:  Change oxygen saturation probe site  4. Every 4-6 hours:  If on nasal continuous positive airway pressure, respiratory therapy assess nares and determine need for appliance change or resting period.   Outcome: Progressing     Problem: ABCDS Injury Assessment  Goal: Absence of physical injury  Outcome: Progressing     Problem: Chronic Conditions and Co-morbidities  Goal: Patient's chronic conditions and co-morbidity symptoms are monitored and maintained or improved  Outcome: Progressing     Problem: Nutrition Deficit:  Goal: Optimize nutritional status  Outcome: Progressing     Problem: Pain  Goal: Verbalizes/displays adequate comfort level or baseline comfort level  Outcome: Progressing

## 2023-08-03 NOTE — PROGRESS NOTES
Physical Therapy  Facility/Department: [facility] ACUTE REHAB UNIT  Rehabilitation Physical Therapy Discharge Summary    NAME: Sina Hutchins  : 1940 (80 y.o.)  MRN: [id number]  CODE STATUS: Full Code    Date of Service: 8/3/23       Restrictions:  Restrictions/Precautions: Fall Risk  Position Activity Restriction  Other position/activity restrictions: up with assist, ambulate the patient     SUBJECTIVE  Subjective  Subjective: pt supine in bed and agreeable to PT  Pain: denies pain          OBJECTIVE  Cognition  Overall Cognitive Status: WFL  Arousal/Alertness: Appropriate responses to stimuli  Following Commands: Follows one step commands consistently; Follows multistep commands with increased time  Attention Span: Difficulty dividing attention; Attends with cues to redirect  Memory: Appears intact  Safety Judgement: Decreased awareness of need for assistance;Decreased awareness of need for safety  Insights: Decreased awareness of deficits  Initiation: Requires cues for some  Sequencing: Requires cues for some  Orientation  Overall Orientation Status: Within Functional Limits  Orientation Level: Oriented X4    Functional Mobility  Roll Left  Assistance Level: Independent  Roll Right  Assistance Level: Independent  Sit to Supine  Assistance Level: Independent  Supine to Sit  Assistance Level: Independent  Scooting  Assistance Level: Independent  Balance  Sitting Balance: Supervision  Standing Balance: Stand by assistance (at RW)  Standing Balance  Activity: picked up obkect from floor with SBA and one UE on RW  Transfers  Surface: From bed; To chair with arms;From chair with arms; Wheelchair; To mat;From mat  Additional Factors: Verbal cues; Hand placement cues; Increased time to complete  Sit to Stand  Assistance Level: Stand by assist  Skilled Clinical Factors: VC for hand placement  Stand to Sit  Assistance Level: Stand by assist  Skilled Clinical Factors: VC for hand placement, safe positioning prior to sitting, and

## 2023-08-04 VITALS
WEIGHT: 136.47 LBS | DIASTOLIC BLOOD PRESSURE: 90 MMHG | RESPIRATION RATE: 16 BRPM | HEART RATE: 66 BPM | HEIGHT: 69 IN | BODY MASS INDEX: 20.21 KG/M2 | SYSTOLIC BLOOD PRESSURE: 147 MMHG | OXYGEN SATURATION: 95 % | TEMPERATURE: 97.5 F

## 2023-08-04 LAB
ANION GAP SERPL CALCULATED.3IONS-SCNC: 7 MMOL/L (ref 3–16)
BASOPHILS # BLD: 0 K/UL (ref 0–0.2)
BASOPHILS NFR BLD: 0.9 %
BUN SERPL-MCNC: 32 MG/DL (ref 7–20)
CALCIUM SERPL-MCNC: 8.9 MG/DL (ref 8.3–10.6)
CHLORIDE SERPL-SCNC: 104 MMOL/L (ref 99–110)
CO2 SERPL-SCNC: 29 MMOL/L (ref 21–32)
CREAT SERPL-MCNC: 1.5 MG/DL (ref 0.8–1.3)
DEPRECATED RDW RBC AUTO: 16.9 % (ref 12.4–15.4)
EOSINOPHIL # BLD: 0.2 K/UL (ref 0–0.6)
EOSINOPHIL NFR BLD: 4.4 %
GFR SERPLBLD CREATININE-BSD FMLA CKD-EPI: 46 ML/MIN/{1.73_M2}
GLUCOSE SERPL-MCNC: 91 MG/DL (ref 70–99)
HCT VFR BLD AUTO: 23.4 % (ref 40.5–52.5)
HGB BLD-MCNC: 7.7 G/DL (ref 13.5–17.5)
LYMPHOCYTES # BLD: 1.1 K/UL (ref 1–5.1)
LYMPHOCYTES NFR BLD: 21.5 %
MCH RBC QN AUTO: 31.2 PG (ref 26–34)
MCHC RBC AUTO-ENTMCNC: 32.9 G/DL (ref 31–36)
MCV RBC AUTO: 94.7 FL (ref 80–100)
MONOCYTES # BLD: 0.5 K/UL (ref 0–1.3)
MONOCYTES NFR BLD: 9.3 %
NEUTROPHILS # BLD: 3.3 K/UL (ref 1.7–7.7)
NEUTROPHILS NFR BLD: 63.9 %
PLATELET # BLD AUTO: 311 K/UL (ref 135–450)
PMV BLD AUTO: 7.4 FL (ref 5–10.5)
POTASSIUM SERPL-SCNC: 4.2 MMOL/L (ref 3.5–5.1)
RBC # BLD AUTO: 2.48 M/UL (ref 4.2–5.9)
SODIUM SERPL-SCNC: 140 MMOL/L (ref 136–145)
WBC # BLD AUTO: 5.2 K/UL (ref 4–11)

## 2023-08-04 PROCEDURE — 85025 COMPLETE CBC W/AUTO DIFF WBC: CPT

## 2023-08-04 PROCEDURE — 80048 BASIC METABOLIC PNL TOTAL CA: CPT

## 2023-08-04 PROCEDURE — 6370000000 HC RX 637 (ALT 250 FOR IP): Performed by: PHYSICAL MEDICINE & REHABILITATION

## 2023-08-04 PROCEDURE — 99232 SBSQ HOSP IP/OBS MODERATE 35: CPT | Performed by: INTERNAL MEDICINE

## 2023-08-04 PROCEDURE — 6370000000 HC RX 637 (ALT 250 FOR IP): Performed by: NURSE PRACTITIONER

## 2023-08-04 PROCEDURE — 36415 COLL VENOUS BLD VENIPUNCTURE: CPT

## 2023-08-04 RX ADMIN — FUROSEMIDE 20 MG: 20 TABLET ORAL at 08:45

## 2023-08-04 RX ADMIN — FERROUS SULFATE TAB 325 MG (65 MG ELEMENTAL FE) 325 MG: 325 (65 FE) TAB at 08:46

## 2023-08-04 RX ADMIN — METOPROLOL SUCCINATE 25 MG: 25 TABLET, EXTENDED RELEASE ORAL at 08:45

## 2023-08-04 RX ADMIN — PANTOPRAZOLE SODIUM 40 MG: 40 TABLET, DELAYED RELEASE ORAL at 06:48

## 2023-08-04 RX ADMIN — APIXABAN 2.5 MG: 5 TABLET, FILM COATED ORAL at 08:45

## 2023-08-04 NOTE — DISCHARGE SUMMARY
tablet  Commonly known as: LASIX  Take 1 tablet by mouth daily            STOP taking these medications      aspirin 81 MG chewable tablet               Where to Get Your Medications        These medications were sent to [facility] [id number] - [facility], [place] - [address] - P [phone] - F [phone]  [address] [facility] [place], [facility] [address]      Phone: [phone]   albuterol-ipratropium  MCG/ACT Aers inhaler  apixaban 2.5 MG Tabs tablet  atorvastatin 40 MG tablet  diclofenac sodium 1 % Gel  ferrous sulfate 325 (65 Fe) MG tablet  furosemide 20 MG tablet  metoprolol succinate 25 MG extended release tablet  pantoprazole 40 MG tablet  polyethylene glycol 17 g packet  tamsulosin 0.4 MG capsule  traZODone 50 MG tablet         I spent over 35 minutes on this discharge encounter between counseling, coordination of care, and medication reconciliation. To comply with [address] [place] [address] bylaw R.II.4.1:  Discharge order placed in advance to facilitate patient's discharge needs.     Trino Hernández, DO

## 2023-08-04 NOTE — PROGRESS NOTES
Nephrology Progress Note                                                                                                                                                                                                                                                                                                                                                               Office : [phone]     Fax :[phone]              Patient's Name: Sina Hutchins    8/4/2023      Assessment/Plan     1 MICHAEL/CKD   - Possibly obstructive, pre-renal due to possible CHF exacerbation    2. HTN    3. Acute anemia  - GI consulted for GIB  - S/p EGD with duodenal ulcers    4. Acid- base/ Electrolyte imbalance     5. COPD exacerbation     6. Bilateral pleural effusions     7. HFrEF   - EF 15-20%, grade 2 DD, moderate MR, pulmonary HTN    8. Atrial flutter     9. 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 Chief Complaint:  Dyspnea    Interval hx:    Resting in bed without complaints     Cr stable  Good UOP  BP's controlled      reports that he has been smoking cigarettes. He has been smoking an average of 1 pack per day. He has never used smokeless tobacco. He reports that he does not currently use alcohol. He reports that he does not currently use drugs. Allergies:  Patient has no known allergies.     Current Medications:    calcium carbonate (TUMS) chewable tablet 500 mg, TID

## 2023-08-04 NOTE — CARE COORDINATION
Case Management Assessment            Discharge Note                    Date / Time of Note: 8/4/2023 10:23 AM                  Discharge Note Completed by: IRINEO Elias    Patient Name: Fritz Adler   YOB: 1940  Diagnosis: Debility [R53.81]   Date / Time: 7/23/2023  3:35 PM    Current PCP: No primary care provider on file. Clinic patient: No    Hospitalization in the last 30 days: Yes       Advance Directives:  Code Status: Full Code  [place] DNR form completed and on chart: Not Indicated    Financial:  Payor: MEDICARE / Plan: MEDICARE PART A AND B / Product Type: *No Product type* /      Pharmacy:    [id number] [id number] [facility], [address] [place] [address]  Phone: [phone] Fax: [phone] [address] [phone] - F [phone]  [address] [place] [address]  Phone: [phone] Fax: [phone]    [facility] [address]  [id number]  [facility] [place] - [address] [phone] Dorys Kendall [phone]  [address] [facility]. [address] [address]  Phone: [phone] Fax: [phone]      Assistance purchasing medications?: Potential Assistance Purchasing Medications: No  Assistance provided by Case Management: None at this time    Does patient want to participate in local refill/ meds to beds program?: Yes    Meds To Beds General Rules:  1. Can ONLY be done Monday- Friday between 8:30am-5pm  2. Prescription(s) must be in pharmacy by 3pm to be filled same day  3. Copy of patient's insurance/ prescription drug card and patient face sheet must be sent along with the prescription(s)  4. Cost of Rx cannot be added to hospital bill. If financial assistance is needed, please contact unit  or ;  or  CANNOT provide pharmacy voucher for patients co-pays  5.  Patients can then

## 2023-08-04 NOTE — PLAN OF CARE
Problem: Discharge Planning  Goal: Discharge to home or other facility with appropriate resources  Outcome: Adequate for Discharge  Discharging with home health and therapy, has support of barry. Problem: Safety - Adult  Goal: Free from fall injury  Outcome: Adequate for Discharge     Problem: Skin/Tissue Integrity  Goal: Absence of new skin breakdown  Description: 1. Monitor for areas of redness and/or skin breakdown  2. Assess vascular access sites hourly  3. Every 4-6 hours minimum:  Change oxygen saturation probe site  4. Every 4-6 hours:  If on nasal continuous positive airway pressure, respiratory therapy assess nares and determine need for appliance change or resting period. Outcome: Adequate for Discharge  Has had no falls despite not adhering to safety precautions today now that anxious to go  home.    Problem: Chronic Conditions and Co-morbidities  Goal: Patient's chronic conditions and co-morbidity symptoms are monitored and maintained or improved  Outcome: Adequate for Discharge     Problem: Nutrition Deficit:  Goal: Optimize nutritional status  Outcome: Adequate for Discharge     Problem: Pain  Goal: Verbalizes/displays adequate comfort level or baseline comfort level  Outcome: Adequate for Discharge   Denies c/o pain     Problem: ABCDS Injury Assessment  Goal: Absence of physical injury  Outcome: Adequate for Discharge

## 2023-08-04 NOTE — PROGRESS NOTES
Patient is alert and oriented. Hard of hearing. Vital signs stable. Patient denies pain or discomfort. ZIO patch in place. Patient has refused to wear life vest. Instructed to call for any needs. Call light in reach. Safety measures in place.

## 2023-08-04 NOTE — PLAN OF CARE
[facility] Discharge Assessment    Transportation  \"Has lack of transportation kept you from medical appointments, meetings, work, or from getting things needed for daily living? \"Check all that apply:  [] A.  Yes, it has kept me from medical appointments or from getting my medications  [] B.  Yes, it has kept me from non-medical meetings, appointments, work, or from getting things that I need  [x] C.  No  [] X. Patient unable to respond  [] Y. Patient declines to respond    Provision of Current Reconciled Medication List to Subsequent Provider at Discharge  [] No, current reconciled medication list not provided to the subsequent provider. [x] Yes, current reconciled medication list provided to the subsequent provider. (**Select route of transmission below**)   [x] Via Electronic Health Record   [] [facility] [employer]  [x] Verbal (e.g. in person, telephone, video conferencing)  [x] Paper-based (e.g. fax, copies, printouts)   [] Other Methods (e.g. texting, email, CDs)    Provision of Current Reconciled Medication List to Patient at Discharge  [] No, current reconciled medication list not provided to the patient, family and/or caregiver. [x] Yes, current reconciled medication list provided to the patient, family and/or caregiver.  (**Select route of transmission below**)   [x] Via Electronic Health Record (e.g., electronic access to patient portal)   [] Via [employer]  [x] Verbal (e.g. in person, telephone, video conferencing)  [x] Paper-based (e.g. fax, copies, printouts)   [] Other Methods (e.g. texting, email, CDs)    Health Literacy  \"How often do you need to have someone help you when you read instructions, pamphlets, or other written material from your doctor or pharmacy? \"  [x]  0. Never  []  1. Rarely  []  2. Sometimes  []  3. Often  []  4. Always  []  7. Patient declines to respond  []  8.   Patient unable to respond    BIMS - **Must be completed in the

## 2023-08-04 NOTE — PROGRESS NOTES
Patient alert and oriented x 4, VSS, shift assessment done as charted, patient was a little irritable today and stressed feeling overwhelmed at all the information about his health. Education done with patient and afterwards called niece on phone and reviewed everything with her, she requested nicotine patches for patient. Dr. Philip Butt agreed and order was called in to [facility]. Niece is aware. Pharmacy also stated that apaxiban would be $500 without insurance coverage. Patient has never needed prescription coverage in the past.  was able to obtain a 30 day free supply coupon for patient which was sent with patient, given to brother at discharge. Most important medications highlighted and explained to brother, so that if there is an issue with insurance, assured patient would get BP and cardiac medications. Patient has been refusing to use life vest, message sent to cardiology, they replied and are aware. RN had called [employer] to  life vest since patient stated did not want to take it home, when risks explained to him by RN and by heart monitor RN who came to remove CAM monitor, patient simply said \"if my heart stops it stops\". At discharge when speaking with Diane Pinto niece she requested that the life vest be sent home with patient and if he continues to refuse it she will make sure it is sent back to the company. Company contacted again to cancel  of life vest from hospital. Home health information sent with patient and explained to niece that they would be contacting her for visit. Patient was safely discharged to brother's car who planning on driving to pharmacy with patient and then to patient's home. Patient left with paperwork, IDs, cash, hearing aids, glasses, cell phone, and .

## 2023-08-07 ENCOUNTER — NURSE ONLY (OUTPATIENT)
Dept: CARDIOLOGY CLINIC | Age: 83
End: 2023-08-07

## 2023-08-07 ENCOUNTER — OFFICE VISIT (OUTPATIENT)
Dept: CARDIOLOGY CLINIC | Age: 83
End: 2023-08-07
Payer: MEDICARE

## 2023-08-07 VITALS
WEIGHT: 146 LBS | BODY MASS INDEX: 21.56 KG/M2 | HEART RATE: 61 BPM | DIASTOLIC BLOOD PRESSURE: 60 MMHG | SYSTOLIC BLOOD PRESSURE: 130 MMHG

## 2023-08-07 DIAGNOSIS — I27.20 PULMONARY HTN (HCC): ICD-10-CM

## 2023-08-07 DIAGNOSIS — Z87.19 HISTORY OF GI BLEED: ICD-10-CM

## 2023-08-07 DIAGNOSIS — K92.1 MELENA: ICD-10-CM

## 2023-08-07 DIAGNOSIS — I48.0 PAF (PAROXYSMAL ATRIAL FIBRILLATION) (HCC): ICD-10-CM

## 2023-08-07 DIAGNOSIS — E78.2 MIXED HYPERLIPIDEMIA: ICD-10-CM

## 2023-08-07 DIAGNOSIS — I50.20 HFREF (HEART FAILURE WITH REDUCED EJECTION FRACTION) (HCC): Primary | ICD-10-CM

## 2023-08-07 DIAGNOSIS — I10 PRIMARY HYPERTENSION: ICD-10-CM

## 2023-08-07 DIAGNOSIS — I34.0 MODERATE MITRAL REGURGITATION: ICD-10-CM

## 2023-08-07 PROCEDURE — G8420 CALC BMI NORM PARAMETERS: HCPCS | Performed by: NURSE PRACTITIONER

## 2023-08-07 PROCEDURE — 1124F ACP DISCUSS-NO DSCNMKR DOCD: CPT | Performed by: NURSE PRACTITIONER

## 2023-08-07 PROCEDURE — 1111F DSCHRG MED/CURRENT MED MERGE: CPT | Performed by: NURSE PRACTITIONER

## 2023-08-07 PROCEDURE — 3078F DIAST BP <80 MM HG: CPT | Performed by: NURSE PRACTITIONER

## 2023-08-07 PROCEDURE — 4004F PT TOBACCO SCREEN RCVD TLK: CPT | Performed by: NURSE PRACTITIONER

## 2023-08-07 PROCEDURE — 3074F SYST BP LT 130 MM HG: CPT | Performed by: NURSE PRACTITIONER

## 2023-08-07 PROCEDURE — 99215 OFFICE O/P EST HI 40 MIN: CPT | Performed by: NURSE PRACTITIONER

## 2023-08-07 PROCEDURE — G8427 DOCREV CUR MEDS BY ELIG CLIN: HCPCS | Performed by: NURSE PRACTITIONER

## 2023-08-07 NOTE — PATIENT INSTRUCTIONS
[facility]  CONTACT INFORMATION  Phone: [phone]  Fax: [phone]  [address], [address] [facility]    I will call to check on 2 week monitor   I will discuss with Dr Viki Hair if you need a stress test vs CT scan    Follow up with GI regarding black stools  Follow up with Nephrology Dr Asif Steinberg for kidney function.      Follow up with Dr Viki Hair or one of our partners in [address] [facility] [address] [facility], [address]  Phone: [phone]

## 2023-08-07 NOTE — PROGRESS NOTES
WBC 5.2 2023    HGB 7.7 (L) 2023    HCT 23.4 (L) 2023    MCV 94.7 2023     2023     BMP:  Lab Results   Component Value Date    CREATININE 1.5 (H) 2023    BUN 32 (H) 2023     2023    K 4.2 2023     2023    CO2 29 2023     Estimated Creatinine Clearance: 36 mL/min (A) (based on SCr of 1.5 mg/dL (H)). Mag:   Lab Results   Component Value Date/Time    MG 2.30 2023 06:52 AM     LIVER PROFILE:   Lab Results   Component Value Date    ALT 73 (H) 2023    AST 85 (H) 2023    ALKPHOS 104 2023    BILITOT 0.5 2023     PT/INR:   Lab Results   Component Value Date    INR 1.13 2023    PROTIME 14.5 2023     Pro-BNP   Lab Results   Component Value Date/Time    PROBNP 1,979 2023 04:31 AM    PROBNP 6,613 2023 07:28 PM    PROBNP 32,395 2023 09:15 PM     LIPIDS:  No components found for: CHLPL  Lab Results   Component Value Date    TRIG 78 2023     Lab Results   Component Value Date    HDL 47 2023     Lab Results   Component Value Date    LDLCALC 81 2023     Lab Results   Component Value Date    LABVLDL 16 2023     TSH:No results found for: TSH, Q4AUVNW, S1JQEAP, THYROIDAB    IMAGIN2023 Echo   Technically difficult study   Moderate to severe LV dilation. Normal left ventricle wall thickness. Ejection fraction is visually estimated to be 15-20%. Severe global   hypokinesis. No evidence of apical thrombus. Grade II diastolic dysfunction with elevated LV filling pressures. Mildly thickened mitral valve leaflets. Mild mitral annular calcification. No mitral stenosis. Moderate mitral regurgitation. Sclerotic aortic valve. No aortic stenosis. Mild aortic regurgitation. Normal RV size with mildly depressed function. IVC size is dilated (>2.1 cm) and collapses < 50% with respiration   consistent with elevated RA pressure (15 mmHg).    Pulmonary

## 2023-08-09 ENCOUNTER — PATIENT MESSAGE (OUTPATIENT)
Dept: CARDIOLOGY CLINIC | Age: 83
End: 2023-08-09

## 2023-08-09 DIAGNOSIS — Z87.19 HISTORY OF GI BLEED: Primary | ICD-10-CM

## 2023-08-09 NOTE — TELEPHONE ENCOUNTER
Informed Renetta pt's barry Carias NP ok with ordering a GI referral for [facility]. Placed referral and fax to [facility]. Pt will discuss testing for ischemic work up at follow up appt with Dr. Bi Sarah. Jeffory Gitelman verbalized understanding.

## 2023-08-09 NOTE — TELEPHONE ENCOUNTER
From: Axel Blankenship  To: Emily Blood  Sent: 8/9/2023 8:22 AM EDT  Subject: GI Follow-up    Hi. You stated we need a GI appointment. Can you give us a referral to Ela De Oliveira or who should we contact? And when will you let us know about which test you will be ordering. .stress test or what? And do you have appointments at [facility] rather than [address] [place] [address] for future visits? Please lmk. Thank you.

## 2023-08-14 PROBLEM — R77.8 ELEVATED TROPONIN: Status: RESOLVED | Noted: 2023-07-15 | Resolved: 2023-08-14

## 2023-08-14 PROBLEM — R79.89 ELEVATED TROPONIN: Status: RESOLVED | Noted: 2023-07-15 | Resolved: 2023-08-14

## 2023-08-18 ENCOUNTER — TELEPHONE (OUTPATIENT)
Dept: CARDIOLOGY CLINIC | Age: 83
End: 2023-08-18

## 2023-08-18 RX ORDER — HYDRALAZINE HYDROCHLORIDE 25 MG/1
25 TABLET, FILM COATED ORAL 2 TIMES DAILY
Qty: 60 TABLET | Refills: 3 | Status: SHIPPED | OUTPATIENT
Start: 2023-08-18 | End: 2023-08-22

## 2023-08-18 NOTE — TELEPHONE ENCOUNTER
Basil from [employer] called stated that Pt is on meteprolol 25mg extended release and his blood pressure was   160/80 in one arm and  160/100 in the other arm Basil can be reached at [phone]

## 2023-08-22 ENCOUNTER — OFFICE VISIT (OUTPATIENT)
Dept: INTERNAL MEDICINE CLINIC | Age: 83
End: 2023-08-22

## 2023-08-22 VITALS
RESPIRATION RATE: 16 BRPM | HEART RATE: 68 BPM | WEIGHT: 142 LBS | BODY MASS INDEX: 19.23 KG/M2 | HEIGHT: 72 IN | SYSTOLIC BLOOD PRESSURE: 120 MMHG | OXYGEN SATURATION: 94 % | DIASTOLIC BLOOD PRESSURE: 60 MMHG

## 2023-08-22 DIAGNOSIS — I50.22 CHRONIC SYSTOLIC CONGESTIVE HEART FAILURE (HCC): ICD-10-CM

## 2023-08-22 DIAGNOSIS — I27.20 PULMONARY HTN (HCC): ICD-10-CM

## 2023-08-22 DIAGNOSIS — I48.0 PAROXYSMAL ATRIAL FIBRILLATION (HCC): ICD-10-CM

## 2023-08-22 DIAGNOSIS — I10 ESSENTIAL HYPERTENSION: ICD-10-CM

## 2023-08-22 DIAGNOSIS — I25.5 ISCHEMIC CARDIOMYOPATHY: ICD-10-CM

## 2023-08-22 DIAGNOSIS — N18.30 STAGE 3 CHRONIC KIDNEY DISEASE, UNSPECIFIED WHETHER STAGE 3A OR 3B CKD (HCC): ICD-10-CM

## 2023-08-22 DIAGNOSIS — I10 ESSENTIAL HYPERTENSION: Primary | ICD-10-CM

## 2023-08-22 LAB
ALBUMIN SERPL-MCNC: 3.9 G/DL (ref 3.4–5)
ALBUMIN/GLOB SERPL: 1.5 {RATIO} (ref 1.1–2.2)
ALP SERPL-CCNC: 115 U/L (ref 40–129)
ALT SERPL-CCNC: 38 U/L (ref 10–40)
ANION GAP SERPL CALCULATED.3IONS-SCNC: 13 MMOL/L (ref 3–16)
AST SERPL-CCNC: 35 U/L (ref 15–37)
BASOPHILS # BLD: 0 K/UL (ref 0–0.2)
BASOPHILS NFR BLD: 0.9 %
BILIRUB SERPL-MCNC: 0.3 MG/DL (ref 0–1)
BUN SERPL-MCNC: 29 MG/DL (ref 7–20)
CALCIUM SERPL-MCNC: 9.3 MG/DL (ref 8.3–10.6)
CHLORIDE SERPL-SCNC: 102 MMOL/L (ref 99–110)
CO2 SERPL-SCNC: 25 MMOL/L (ref 21–32)
CREAT SERPL-MCNC: 1.8 MG/DL (ref 0.8–1.3)
DEPRECATED RDW RBC AUTO: 17.1 % (ref 12.4–15.4)
EOSINOPHIL # BLD: 0.1 K/UL (ref 0–0.6)
EOSINOPHIL NFR BLD: 2.4 %
GFR SERPLBLD CREATININE-BSD FMLA CKD-EPI: 37 ML/MIN/{1.73_M2}
GLUCOSE SERPL-MCNC: 98 MG/DL (ref 70–99)
HCT VFR BLD AUTO: 32.6 % (ref 40.5–52.5)
HGB BLD-MCNC: 10.7 G/DL (ref 13.5–17.5)
LYMPHOCYTES # BLD: 0.9 K/UL (ref 1–5.1)
LYMPHOCYTES NFR BLD: 16.6 %
MCH RBC QN AUTO: 31.4 PG (ref 26–34)
MCHC RBC AUTO-ENTMCNC: 32.9 G/DL (ref 31–36)
MCV RBC AUTO: 95.4 FL (ref 80–100)
MONOCYTES # BLD: 0.6 K/UL (ref 0–1.3)
MONOCYTES NFR BLD: 12.1 %
NEUTROPHILS # BLD: 3.6 K/UL (ref 1.7–7.7)
NEUTROPHILS NFR BLD: 68 %
PLATELET # BLD AUTO: 204 K/UL (ref 135–450)
PMV BLD AUTO: 8.4 FL (ref 5–10.5)
POTASSIUM SERPL-SCNC: 4.4 MMOL/L (ref 3.5–5.1)
PROT SERPL-MCNC: 6.5 G/DL (ref 6.4–8.2)
RBC # BLD AUTO: 3.41 M/UL (ref 4.2–5.9)
SODIUM SERPL-SCNC: 140 MMOL/L (ref 136–145)
T4 FREE SERPL-MCNC: 1.2 NG/DL (ref 0.9–1.8)
TSH SERPL DL<=0.005 MIU/L-ACNC: 4.39 UIU/ML (ref 0.27–4.2)
WBC # BLD AUTO: 5.3 K/UL (ref 4–11)

## 2023-08-22 PROCEDURE — 1111F DSCHRG MED/CURRENT MED MERGE: CPT | Performed by: NURSE PRACTITIONER

## 2023-08-22 PROCEDURE — 4004F PT TOBACCO SCREEN RCVD TLK: CPT | Performed by: NURSE PRACTITIONER

## 2023-08-22 PROCEDURE — 3078F DIAST BP <80 MM HG: CPT | Performed by: NURSE PRACTITIONER

## 2023-08-22 PROCEDURE — 1124F ACP DISCUSS-NO DSCNMKR DOCD: CPT | Performed by: NURSE PRACTITIONER

## 2023-08-22 PROCEDURE — 99205 OFFICE O/P NEW HI 60 MIN: CPT | Performed by: NURSE PRACTITIONER

## 2023-08-22 PROCEDURE — 3074F SYST BP LT 130 MM HG: CPT | Performed by: NURSE PRACTITIONER

## 2023-08-22 PROCEDURE — G8427 DOCREV CUR MEDS BY ELIG CLIN: HCPCS | Performed by: NURSE PRACTITIONER

## 2023-08-22 PROCEDURE — G8420 CALC BMI NORM PARAMETERS: HCPCS | Performed by: NURSE PRACTITIONER

## 2023-08-22 ASSESSMENT — ENCOUNTER SYMPTOMS
DIARRHEA: 0
VOMITING: 0
SHORTNESS OF BREATH: 1
ABDOMINAL PAIN: 0
NAUSEA: 0

## 2023-08-22 ASSESSMENT — PATIENT HEALTH QUESTIONNAIRE - PHQ9
SUM OF ALL RESPONSES TO PHQ9 QUESTIONS 1 & 2: 1
SUM OF ALL RESPONSES TO PHQ QUESTIONS 1-9: 1
2. FEELING DOWN, DEPRESSED OR HOPELESS: 0
SUM OF ALL RESPONSES TO PHQ QUESTIONS 1-9: 1
1. LITTLE INTEREST OR PLEASURE IN DOING THINGS: 1

## 2023-08-22 NOTE — PROGRESS NOTES
mitral regurgitation. Sclerotic aortic valve. No aortic stenosis. Mild aortic regurgitation. Normal RV size with mildly depressed function. IVC size is dilated (>2.1 cm) and collapses < 50% with respiration   consistent with elevated RA pressure (15 mmHg). Pulmonary hypertension. Estimated pulmonary artery systolic pressure is elevated at 50 mmHg    MRI Brain 7/23/23  IMPRESSION:     1. 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 No intracranial hemorrhage or mass  3. Moderate chronic small vessel ischemia    CT chest 7/12/23  IMPRESSION:  1. No pulmonary embolism. 2. Cardiomegaly with particular enlargement of the left ventricle and mild  congestive heart failure with vascular congestion minimal septal thickening. 3. Moderate right and small left pleural effusions with mild adjacent  opacities in the lower lobes favored to represent associated atelectasis. 4. Mild emphysema. 5. Severe left hydronephrosis which is likely chronic is there is diffuse  left renal cortical thinning. 10.4 cm simple cyst is noted at the upper pole  the right kidney. No imaging follow-up is recommended for the cyst.  6. Calcified granuloma right upper lobe which is believed to account for the  6 mm nodule on the radiograph. CT abdomen/pelvis 7/14/23  IMPRESSION:  1. Marked left hydronephrosis with profound renal cortical thinning, likely  chronic in nature. No obstructing urolithiasis. Marked left hydroureter. Consider correlation with CT urogram or cystoscopy. 2. Moderate distention of the urinary bladder. No calculi. 3. Simple appearing right renal cyst. No further imaging follow-up is indicated.      4. 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     5. Stable findings of the chest.    Renal US 7/14/23  IMPRESSION:     Severe left sided hydronephrosis likely chronic with thinning

## 2023-08-30 ENCOUNTER — OFFICE VISIT (OUTPATIENT)
Dept: CARDIOLOGY CLINIC | Age: 83
End: 2023-08-30
Payer: MEDICARE

## 2023-08-30 VITALS
WEIGHT: 141 LBS | DIASTOLIC BLOOD PRESSURE: 80 MMHG | BODY MASS INDEX: 19.12 KG/M2 | SYSTOLIC BLOOD PRESSURE: 140 MMHG | HEART RATE: 62 BPM

## 2023-08-30 DIAGNOSIS — I50.22 CHRONIC SYSTOLIC CHF (CONGESTIVE HEART FAILURE) (HCC): ICD-10-CM

## 2023-08-30 DIAGNOSIS — D68.69 SECONDARY HYPERCOAGULABLE STATE (HCC): ICD-10-CM

## 2023-08-30 DIAGNOSIS — I42.8 OTHER CARDIOMYOPATHY (HCC): Primary | ICD-10-CM

## 2023-08-30 DIAGNOSIS — Z79.01 ON CONTINUOUS ORAL ANTICOAGULATION: ICD-10-CM

## 2023-08-30 DIAGNOSIS — I48.0 PAROXYSMAL ATRIAL FIBRILLATION (HCC): ICD-10-CM

## 2023-08-30 DIAGNOSIS — I10 BENIGN ESSENTIAL HTN: ICD-10-CM

## 2023-08-30 DIAGNOSIS — I47.1 ATRIAL TACHYCARDIA (HCC): ICD-10-CM

## 2023-08-30 PROCEDURE — 1111F DSCHRG MED/CURRENT MED MERGE: CPT | Performed by: NURSE PRACTITIONER

## 2023-08-30 PROCEDURE — 99215 OFFICE O/P EST HI 40 MIN: CPT | Performed by: NURSE PRACTITIONER

## 2023-08-30 PROCEDURE — 3077F SYST BP >= 140 MM HG: CPT | Performed by: NURSE PRACTITIONER

## 2023-08-30 PROCEDURE — 4004F PT TOBACCO SCREEN RCVD TLK: CPT | Performed by: NURSE PRACTITIONER

## 2023-08-30 PROCEDURE — G8427 DOCREV CUR MEDS BY ELIG CLIN: HCPCS | Performed by: NURSE PRACTITIONER

## 2023-08-30 PROCEDURE — 93000 ELECTROCARDIOGRAM COMPLETE: CPT | Performed by: NURSE PRACTITIONER

## 2023-08-30 PROCEDURE — 3079F DIAST BP 80-89 MM HG: CPT | Performed by: NURSE PRACTITIONER

## 2023-08-30 PROCEDURE — 1124F ACP DISCUSS-NO DSCNMKR DOCD: CPT | Performed by: NURSE PRACTITIONER

## 2023-08-30 PROCEDURE — G8420 CALC BMI NORM PARAMETERS: HCPCS | Performed by: NURSE PRACTITIONER

## 2023-08-30 RX ORDER — FUROSEMIDE 20 MG/1
20 TABLET ORAL DAILY
Qty: 30 TABLET | Refills: 5 | Status: SHIPPED | OUTPATIENT
Start: 2023-08-30

## 2023-08-30 RX ORDER — PANTOPRAZOLE SODIUM 40 MG/1
40 TABLET, DELAYED RELEASE ORAL
Qty: 60 TABLET | Refills: 5 | Status: SHIPPED | OUTPATIENT
Start: 2023-08-30

## 2023-08-30 RX ORDER — METOPROLOL SUCCINATE 25 MG/1
25 TABLET, EXTENDED RELEASE ORAL DAILY
Qty: 30 TABLET | Refills: 5 | Status: SHIPPED | OUTPATIENT
Start: 2023-08-30

## 2023-08-30 RX ORDER — ATORVASTATIN CALCIUM 40 MG/1
40 TABLET, FILM COATED ORAL NIGHTLY
Qty: 30 TABLET | Refills: 5 | Status: SHIPPED | OUTPATIENT
Start: 2023-08-30

## 2023-09-08 DIAGNOSIS — I48.0 PAROXYSMAL ATRIAL FIBRILLATION (HCC): Primary | ICD-10-CM

## 2023-09-20 ENCOUNTER — OFFICE VISIT (OUTPATIENT)
Dept: INTERNAL MEDICINE CLINIC | Age: 83
End: 2023-09-20

## 2023-09-20 VITALS
WEIGHT: 145 LBS | DIASTOLIC BLOOD PRESSURE: 80 MMHG | RESPIRATION RATE: 12 BRPM | BODY MASS INDEX: 19.64 KG/M2 | HEART RATE: 70 BPM | HEIGHT: 72 IN | SYSTOLIC BLOOD PRESSURE: 144 MMHG

## 2023-09-20 DIAGNOSIS — I50.22 CHRONIC SYSTOLIC (CONGESTIVE) HEART FAILURE (HCC): ICD-10-CM

## 2023-09-20 DIAGNOSIS — I34.0 MODERATE MITRAL REGURGITATION: ICD-10-CM

## 2023-09-20 DIAGNOSIS — I25.5 ISCHEMIC CARDIOMYOPATHY: ICD-10-CM

## 2023-09-20 DIAGNOSIS — I27.20 PULMONARY HTN (HCC): ICD-10-CM

## 2023-09-20 DIAGNOSIS — I50.22 CHRONIC SYSTOLIC (CONGESTIVE) HEART FAILURE (HCC): Primary | ICD-10-CM

## 2023-09-20 DIAGNOSIS — I10 PRIMARY HYPERTENSION: ICD-10-CM

## 2023-09-20 DIAGNOSIS — D68.69 SECONDARY HYPERCOAGULABLE STATE (HCC): ICD-10-CM

## 2023-09-20 DIAGNOSIS — N18.30 STAGE 3 CHRONIC KIDNEY DISEASE, UNSPECIFIED WHETHER STAGE 3A OR 3B CKD (HCC): ICD-10-CM

## 2023-09-20 DIAGNOSIS — I48.0 PAROXYSMAL ATRIAL FIBRILLATION (HCC): ICD-10-CM

## 2023-09-20 LAB
BASOPHILS # BLD: 0.1 K/UL (ref 0–0.2)
BASOPHILS NFR BLD: 1 %
DEPRECATED RDW RBC AUTO: 15.3 % (ref 12.4–15.4)
EOSINOPHIL # BLD: 0.1 K/UL (ref 0–0.6)
EOSINOPHIL NFR BLD: 1.2 %
HCT VFR BLD AUTO: 37.2 % (ref 40.5–52.5)
HGB BLD-MCNC: 12.2 G/DL (ref 13.5–17.5)
LYMPHOCYTES # BLD: 1.1 K/UL (ref 1–5.1)
LYMPHOCYTES NFR BLD: 15.6 %
MCH RBC QN AUTO: 31.2 PG (ref 26–34)
MCHC RBC AUTO-ENTMCNC: 32.9 G/DL (ref 31–36)
MCV RBC AUTO: 94.9 FL (ref 80–100)
MONOCYTES # BLD: 0.7 K/UL (ref 0–1.3)
MONOCYTES NFR BLD: 9.5 %
NEUTROPHILS # BLD: 5.1 K/UL (ref 1.7–7.7)
NEUTROPHILS NFR BLD: 72.7 %
PLATELET # BLD AUTO: 209 K/UL (ref 135–450)
PMV BLD AUTO: 9 FL (ref 5–10.5)
RBC # BLD AUTO: 3.92 M/UL (ref 4.2–5.9)
WBC # BLD AUTO: 7 K/UL (ref 4–11)

## 2023-09-20 RX ORDER — HYDRALAZINE HYDROCHLORIDE 25 MG/1
25 TABLET, FILM COATED ORAL 2 TIMES DAILY
COMMUNITY
Start: 2023-09-08

## 2023-09-20 ASSESSMENT — ENCOUNTER SYMPTOMS
WHEEZING: 0
SHORTNESS OF BREATH: 1
ABDOMINAL PAIN: 0
RHINORRHEA: 0
DIARRHEA: 0
BACK PAIN: 0
VOMITING: 0
NAUSEA: 0

## 2023-09-21 ENCOUNTER — TELEPHONE (OUTPATIENT)
Dept: INTERNAL MEDICINE CLINIC | Age: 83
End: 2023-09-21

## 2023-09-21 DIAGNOSIS — E03.9 HYPOTHYROIDISM, UNSPECIFIED TYPE: Primary | ICD-10-CM

## 2023-09-21 DIAGNOSIS — R79.89 ELEVATED LFTS: ICD-10-CM

## 2023-09-21 LAB
ALBUMIN SERPL-MCNC: 4.1 G/DL (ref 3.4–5)
ALBUMIN/GLOB SERPL: 1.4 {RATIO} (ref 1.1–2.2)
ALP SERPL-CCNC: 125 U/L (ref 40–129)
ALT SERPL-CCNC: 144 U/L (ref 10–40)
ANION GAP SERPL CALCULATED.3IONS-SCNC: 13 MMOL/L (ref 3–16)
AST SERPL-CCNC: 86 U/L (ref 15–37)
BILIRUB SERPL-MCNC: 0.4 MG/DL (ref 0–1)
BUN SERPL-MCNC: 30 MG/DL (ref 7–20)
CALCIUM SERPL-MCNC: 9.2 MG/DL (ref 8.3–10.6)
CHLORIDE SERPL-SCNC: 103 MMOL/L (ref 99–110)
CO2 SERPL-SCNC: 26 MMOL/L (ref 21–32)
CREAT SERPL-MCNC: 1.6 MG/DL (ref 0.8–1.3)
GFR SERPLBLD CREATININE-BSD FMLA CKD-EPI: 43 ML/MIN/{1.73_M2}
GLUCOSE SERPL-MCNC: 81 MG/DL (ref 70–99)
POTASSIUM SERPL-SCNC: 4.9 MMOL/L (ref 3.5–5.1)
PROT SERPL-MCNC: 7 G/DL (ref 6.4–8.2)
SODIUM SERPL-SCNC: 142 MMOL/L (ref 136–145)
TSH SERPL DL<=0.005 MIU/L-ACNC: 5.48 UIU/ML (ref 0.27–4.2)
URATE SERPL-MCNC: 8.6 MG/DL (ref 3.5–7.2)

## 2023-09-21 RX ORDER — LEVOTHYROXINE SODIUM 0.03 MG/1
25 TABLET ORAL DAILY
Qty: 30 TABLET | Refills: 1 | Status: SHIPPED | OUTPATIENT
Start: 2023-09-21

## 2023-09-21 NOTE — TELEPHONE ENCOUNTER
----- Message from Alpesh Hammer MD sent at 9/21/2023  7:59 AM EDT -----  Stop lipitor-liver enzymes elevated. Check liver enzymes in two weeks  Start Synthroid 25 mcg po daily. Check TSH in 6 weeks.   Creatinine is 1.6 better than 1.8

## 2023-10-20 ENCOUNTER — OFFICE VISIT (OUTPATIENT)
Dept: CARDIOLOGY CLINIC | Age: 83
End: 2023-10-20
Payer: COMMERCIAL

## 2023-10-20 VITALS
DIASTOLIC BLOOD PRESSURE: 76 MMHG | OXYGEN SATURATION: 96 % | BODY MASS INDEX: 20.26 KG/M2 | WEIGHT: 149.6 LBS | HEART RATE: 78 BPM | HEIGHT: 72 IN | SYSTOLIC BLOOD PRESSURE: 130 MMHG

## 2023-10-20 DIAGNOSIS — I48.0 PAROXYSMAL ATRIAL FIBRILLATION (HCC): ICD-10-CM

## 2023-10-20 DIAGNOSIS — I25.5 ISCHEMIC CARDIOMYOPATHY: ICD-10-CM

## 2023-10-20 DIAGNOSIS — I50.22 CHRONIC SYSTOLIC (CONGESTIVE) HEART FAILURE (HCC): Primary | ICD-10-CM

## 2023-10-20 DIAGNOSIS — I10 PRIMARY HYPERTENSION: ICD-10-CM

## 2023-10-20 DIAGNOSIS — N18.30 STAGE 3 CHRONIC KIDNEY DISEASE, UNSPECIFIED WHETHER STAGE 3A OR 3B CKD (HCC): ICD-10-CM

## 2023-10-20 PROCEDURE — 3078F DIAST BP <80 MM HG: CPT | Performed by: STUDENT IN AN ORGANIZED HEALTH CARE EDUCATION/TRAINING PROGRAM

## 2023-10-20 PROCEDURE — 3075F SYST BP GE 130 - 139MM HG: CPT | Performed by: STUDENT IN AN ORGANIZED HEALTH CARE EDUCATION/TRAINING PROGRAM

## 2023-10-20 PROCEDURE — 99212 OFFICE O/P EST SF 10 MIN: CPT | Performed by: STUDENT IN AN ORGANIZED HEALTH CARE EDUCATION/TRAINING PROGRAM

## 2023-10-20 PROCEDURE — 1124F ACP DISCUSS-NO DSCNMKR DOCD: CPT | Performed by: STUDENT IN AN ORGANIZED HEALTH CARE EDUCATION/TRAINING PROGRAM

## 2023-10-20 NOTE — PATIENT INSTRUCTIONS
Recommend that you start taking Hydralazine 25 mg two times daily to help with your heart strength  The side effect you read about only occurs rarely at high doses of medication and this is a very low dose  Your heart strength is low at 15%-20%. Normal heart strength is 55%  Dr. Conley Has will discuss putting a pacemaker in when you see him  11/2/23 because of your low heart strength. Recommend having  left heart cath to look for blockages. My office will call you to schedule this procedure  We will give you some extra fluid to try and help decrease the risk of damage to your kidneys. We usually go in through your wrist to look for blockages. Risks of procedure is bleeding, infection, damage to your kidneys, stroke, or even death  We can use limited contrast trying to help protect the kidneys  Risks of not doing procedure is deadly heart rhythms, future heart attack or stroke,   Call my office at [phone] if you decide that you want to have a heart cath and I will get you scheduled. Recommend starting jardiance 10 mg daily which is used to help increase your heart strength  Recommend repeat BMP in 7-10 days to check your kidneys and electrolytes if you start taking jardiance  I understand that you want to call and talk to kidney doctor before starting any new medications or have a heart cath. Call and let me know if you wish to start jardiance or have a heart cath. Call [phone] to schedule a limited echo to look a the strength of your heart.     Follow up with me in 2-3 months

## 2023-10-20 NOTE — PROGRESS NOTES
CARDIOLOGY CONSULTATION      Patient Name: Tom Davila  Primary Care physician: Apple Dong MD    Reason for Referral/Chief Complaint: Tom Davila is a 80 y.o. patient who is referred to cardiology clinic today for evaluation and treatment of HTN, ischemic CM, CHF, AFib. History of Present Illness:   Axel Blankenship is a 80 y.o. male  with a medical history notable for HTN, ischemic CM, CHF, AFib, CKD 3    Today, he reports he is not sure why her is here. He has been doing ok since getting out of [facility]. Patient admitted for acute GIB, NSTEMI, and newly reduced LVEF 15-20% in July/August 2023. He walks 1/2 mile every couple of days and he gets a little short of breath but he doesn't have to stop. He hasn't seen a doctor since around 1967 when he got out of the service. He denies any swelling. He is able to lay flat. He has gained a little weight. He takes his blood thinner daily. He never started taking Hydralazine for fear of side effects. He no longer smokes. He quit 3 months ago. 1 pack a day for 60 years. Denies any alcohol or drugs    The patient denies chest pain, shortness of breath at rest and dyspnea with exertion. Denies palpitations, dizziness, near-syncope or benny syncope. Denies paroxysmal nocturnal dyspnea, orthopnea, bendopnea, increasing lower extremity edema or weight gain. The patient endorses highest level of activity as walks 1/2 every couple of days. Past Medical History:   has a past medical history of Atrial fibrillation ([address]), CHF (congestive heart failure) ([address]), COPD (chronic obstructive pulmonary disease) ([address]), Hyperlipidemia, Hypertension, and Smoker. Surgical History:   has a past surgical history that includes Upper gastrointestinal endoscopy (N/A, 7/19/2023) and Upper gastrointestinal endoscopy (N/A, 7/27/2023). Social History:   reports that he quit smoking about 3 months ago. His smoking use included cigarettes.  He has a 66.00

## 2023-11-03 NOTE — TELEPHONE ENCOUNTER
Pt called and stated he needs to start his Jardiance 10mg  Requested it to be sent to [address]:     Balbir Argueta [id number] - Rufina Laurent, [address] [place] [address] [place] [phone]   [address] [place] [address], [address] [place] [address]   Phone:  [phone]  Fax:  [phone]    Last OV:10/20/23

## 2023-12-07 PROBLEM — I51.7 CARDIOMEGALY: Status: ACTIVE | Noted: 2023-12-07

## 2023-12-26 RX ORDER — METOPROLOL SUCCINATE 25 MG/1
25 TABLET, EXTENDED RELEASE ORAL DAILY
Qty: 30 TABLET | Refills: 5 | Status: SHIPPED | OUTPATIENT
Start: 2023-12-26

## 2024-02-19 ENCOUNTER — TELEPHONE (OUTPATIENT)
Dept: INTERNAL MEDICINE CLINIC | Age: 84
End: 2024-02-19

## 2024-02-19 RX ORDER — TAMSULOSIN HYDROCHLORIDE 0.4 MG/1
0.4 CAPSULE ORAL NIGHTLY
Qty: 30 CAPSULE | Refills: 3 | Status: SHIPPED | OUTPATIENT
Start: 2024-02-19

## 2024-02-19 NOTE — TELEPHONE ENCOUNTER
----- Message from Hilary Corral MD sent at 2/19/2024  3:08 PM EST -----  Contact: [phone]  yes  ----- Message -----  From: Nuris Quinones MA  Sent: 2/19/2024   2:06 PM EST  To: Hilary Corral MD    Patient called and states that he had received a script for Flomax from a Dr. Hernández while in the hospital. It had 3 refills, but he has not filled it in over 7 months, so the pharmacy states he needs a new script. Would you be willing to call that in for him? Please advise.         [facility]

## 2024-02-23 ENCOUNTER — TELEPHONE (OUTPATIENT)
Dept: INTERNAL MEDICINE CLINIC | Age: 84
End: 2024-02-23

## 2024-02-23 DIAGNOSIS — Z00.00 ROUTINE GENERAL MEDICAL EXAMINATION AT A HEALTH CARE FACILITY: ICD-10-CM

## 2024-02-23 DIAGNOSIS — I10 PRIMARY HYPERTENSION: ICD-10-CM

## 2024-02-23 DIAGNOSIS — I50.22 CHRONIC SYSTOLIC (CONGESTIVE) HEART FAILURE (HCC): Primary | ICD-10-CM

## 2024-02-23 DIAGNOSIS — N18.30 STAGE 3 CHRONIC KIDNEY DISEASE, UNSPECIFIED WHETHER STAGE 3A OR 3B CKD (HCC): ICD-10-CM

## 2024-02-23 NOTE — TELEPHONE ENCOUNTER
----- Message from Hilary Corral MD sent at 2/23/2024  1:24 PM EST -----  Contact: [phone]  CMP/CBCD/TSH/URIC ACID/LIPID/PSA  ----- Message -----  From: Kiera Luu  Sent: 2/22/2024   2:08 PM EST  To: Hilary Corral MD    Pt requesting orders for blood work. Please advise pt when order are in.  Please advise

## 2024-03-08 ENCOUNTER — HOSPITAL ENCOUNTER (OUTPATIENT)
Age: 84
Discharge: HOME | End: 2024-03-08

## 2024-03-08 DIAGNOSIS — N18.30 STAGE 3 CHRONIC KIDNEY DISEASE, UNSPECIFIED WHETHER STAGE 3A OR 3B CKD (HCC): ICD-10-CM

## 2024-03-08 DIAGNOSIS — I10 PRIMARY HYPERTENSION: ICD-10-CM

## 2024-03-08 DIAGNOSIS — I50.22 CHRONIC SYSTOLIC (CONGESTIVE) HEART FAILURE (HCC): ICD-10-CM

## 2024-03-08 DIAGNOSIS — Z00.00 ROUTINE GENERAL MEDICAL EXAMINATION AT A HEALTH CARE FACILITY: ICD-10-CM

## 2024-03-08 LAB
ALBUMIN SERPL-MCNC: 3.9 G/DL (ref 3.4–5)
ALBUMIN/GLOB SERPL: 1.3 {RATIO} (ref 1.1–2.2)
ALP SERPL-CCNC: 123 U/L (ref 40–129)
ALT SERPL-CCNC: 17 U/L (ref 10–40)
ANION GAP SERPL CALCULATED.3IONS-SCNC: 11 MMOL/L (ref 3–16)
AST SERPL-CCNC: 24 U/L (ref 15–37)
BASOPHILS # BLD: 0 K/UL (ref 0–0.2)
BASOPHILS NFR BLD: 0.5 %
BILIRUB SERPL-MCNC: 0.3 MG/DL (ref 0–1)
BUN SERPL-MCNC: 40 MG/DL (ref 7–20)
CALCIUM SERPL-MCNC: 9.3 MG/DL (ref 8.3–10.6)
CHLORIDE SERPL-SCNC: 103 MMOL/L (ref 99–110)
CHOLEST SERPL-MCNC: 162 MG/DL (ref 0–199)
CO2 SERPL-SCNC: 27 MMOL/L (ref 21–32)
CREAT SERPL-MCNC: 1.7 MG/DL (ref 0.8–1.3)
DEPRECATED RDW RBC AUTO: 13.9 % (ref 12.4–15.4)
EOSINOPHIL # BLD: 0.1 K/UL (ref 0–0.6)
EOSINOPHIL NFR BLD: 2.5 %
GFR SERPLBLD CREATININE-BSD FMLA CKD-EPI: 39 ML/MIN/{1.73_M2}
GLUCOSE SERPL-MCNC: 165 MG/DL (ref 70–99)
HCT VFR BLD AUTO: 36.7 % (ref 40.5–52.5)
HDLC SERPL-MCNC: 46 MG/DL (ref 40–60)
HGB BLD-MCNC: 12.5 G/DL (ref 13.5–17.5)
LDL CHOLESTEROL CALCULATED: 103 MG/DL
LYMPHOCYTES # BLD: 1.1 K/UL (ref 1–5.1)
LYMPHOCYTES NFR BLD: 17.8 %
MCH RBC QN AUTO: 31.8 PG (ref 26–34)
MCHC RBC AUTO-ENTMCNC: 34.1 G/DL (ref 31–36)
MCV RBC AUTO: 93 FL (ref 80–100)
MONOCYTES # BLD: 0.6 K/UL (ref 0–1.3)
MONOCYTES NFR BLD: 9.3 %
NEUTROPHILS # BLD: 4.2 K/UL (ref 1.7–7.7)
NEUTROPHILS NFR BLD: 69.9 %
PLATELET # BLD AUTO: 201 K/UL (ref 135–450)
PMV BLD AUTO: 8.5 FL (ref 5–10.5)
POTASSIUM SERPL-SCNC: 4.5 MMOL/L (ref 3.5–5.1)
PROT SERPL-MCNC: 7 G/DL (ref 6.4–8.2)
PSA SERPL DL<=0.01 NG/ML-MCNC: 1.05 NG/ML (ref 0–4)
RBC # BLD AUTO: 3.95 M/UL (ref 4.2–5.9)
SODIUM SERPL-SCNC: 141 MMOL/L (ref 136–145)
T4 FREE SERPL-MCNC: 1.3 NG/DL (ref 0.9–1.8)
TRIGL SERPL-MCNC: 65 MG/DL (ref 0–150)
TSH SERPL DL<=0.005 MIU/L-ACNC: 6.5 UIU/ML (ref 0.27–4.2)
URATE SERPL-MCNC: 9.6 MG/DL (ref 3.5–7.2)
VLDLC SERPL CALC-MCNC: 13 MG/DL
WBC # BLD AUTO: 6 K/UL (ref 4–11)

## 2024-03-08 PROCEDURE — 84153 ASSAY OF PSA TOTAL: CPT

## 2024-03-08 PROCEDURE — 84439 ASSAY OF FREE THYROXINE: CPT

## 2024-03-08 PROCEDURE — 80053 COMPREHEN METABOLIC PANEL: CPT

## 2024-03-08 PROCEDURE — 80061 LIPID PANEL: CPT

## 2024-03-08 PROCEDURE — 84443 ASSAY THYROID STIM HORMONE: CPT

## 2024-03-08 PROCEDURE — 36415 COLL VENOUS BLD VENIPUNCTURE: CPT

## 2024-03-08 PROCEDURE — 84550 ASSAY OF BLOOD/URIC ACID: CPT

## 2024-03-08 PROCEDURE — 85025 COMPLETE CBC W/AUTO DIFF WBC: CPT

## 2024-03-12 DIAGNOSIS — R73.01 ELEVATED FASTING GLUCOSE: Primary | ICD-10-CM

## 2024-03-12 DIAGNOSIS — R73.01 ELEVATED FASTING GLUCOSE: ICD-10-CM

## 2024-03-13 LAB
EST. AVERAGE GLUCOSE BLD GHB EST-MCNC: 116.9 MG/DL
HBA1C MFR BLD: 5.7 %

## 2024-03-19 ENCOUNTER — TELEPHONE (OUTPATIENT)
Dept: INTERNAL MEDICINE CLINIC | Age: 84
End: 2024-03-19

## 2024-03-19 DIAGNOSIS — E03.9 HYPOTHYROIDISM, UNSPECIFIED TYPE: Primary | ICD-10-CM

## 2024-03-19 RX ORDER — LEVOTHYROXINE SODIUM 0.05 MG/1
50 TABLET ORAL DAILY
Qty: 30 TABLET | Refills: 1 | Status: SHIPPED | OUTPATIENT
Start: 2024-03-19

## 2024-03-19 NOTE — TELEPHONE ENCOUNTER
----- Message from Hilary Corral MD sent at 3/19/2024  1:52 PM EDT -----  Increase synthroid to 50 mcg po daily and check TSH in 6 w

## 2024-04-18 ENCOUNTER — OFFICE VISIT (OUTPATIENT)
Dept: INTERNAL MEDICINE CLINIC | Age: 84
End: 2024-04-18

## 2024-04-18 VITALS
WEIGHT: 159 LBS | HEART RATE: 68 BPM | HEIGHT: 72 IN | RESPIRATION RATE: 12 BRPM | SYSTOLIC BLOOD PRESSURE: 136 MMHG | BODY MASS INDEX: 21.54 KG/M2 | DIASTOLIC BLOOD PRESSURE: 80 MMHG

## 2024-04-18 DIAGNOSIS — I48.0 PAROXYSMAL ATRIAL FIBRILLATION (HCC): ICD-10-CM

## 2024-04-18 DIAGNOSIS — I50.22 CHRONIC SYSTOLIC (CONGESTIVE) HEART FAILURE (HCC): ICD-10-CM

## 2024-04-18 DIAGNOSIS — N18.30 STAGE 3 CHRONIC KIDNEY DISEASE, UNSPECIFIED WHETHER STAGE 3A OR 3B CKD (HCC): ICD-10-CM

## 2024-04-18 DIAGNOSIS — I10 PRIMARY HYPERTENSION: Primary | ICD-10-CM

## 2024-04-18 DIAGNOSIS — I10 PRIMARY HYPERTENSION: ICD-10-CM

## 2024-04-18 DIAGNOSIS — I34.0 MODERATE MITRAL REGURGITATION: ICD-10-CM

## 2024-04-18 DIAGNOSIS — D68.69 SECONDARY HYPERCOAGULABLE STATE (HCC): ICD-10-CM

## 2024-04-18 PROCEDURE — 99214 OFFICE O/P EST MOD 30 MIN: CPT | Performed by: INTERNAL MEDICINE

## 2024-04-18 PROCEDURE — G8427 DOCREV CUR MEDS BY ELIG CLIN: HCPCS | Performed by: INTERNAL MEDICINE

## 2024-04-18 PROCEDURE — G8420 CALC BMI NORM PARAMETERS: HCPCS | Performed by: INTERNAL MEDICINE

## 2024-04-18 PROCEDURE — 3075F SYST BP GE 130 - 139MM HG: CPT | Performed by: INTERNAL MEDICINE

## 2024-04-18 PROCEDURE — 3079F DIAST BP 80-89 MM HG: CPT | Performed by: INTERNAL MEDICINE

## 2024-04-18 PROCEDURE — 1036F TOBACCO NON-USER: CPT | Performed by: INTERNAL MEDICINE

## 2024-04-18 PROCEDURE — 1124F ACP DISCUSS-NO DSCNMKR DOCD: CPT | Performed by: INTERNAL MEDICINE

## 2024-04-18 ASSESSMENT — PATIENT HEALTH QUESTIONNAIRE - PHQ9
1. LITTLE INTEREST OR PLEASURE IN DOING THINGS: NOT AT ALL
SUM OF ALL RESPONSES TO PHQ QUESTIONS 1-9: 0
SUM OF ALL RESPONSES TO PHQ9 QUESTIONS 1 & 2: 0
SUM OF ALL RESPONSES TO PHQ QUESTIONS 1-9: 0
SUM OF ALL RESPONSES TO PHQ QUESTIONS 1-9: 0
2. FEELING DOWN, DEPRESSED OR HOPELESS: NOT AT ALL
SUM OF ALL RESPONSES TO PHQ QUESTIONS 1-9: 0

## 2024-04-18 ASSESSMENT — ENCOUNTER SYMPTOMS
ABDOMINAL PAIN: 0
NAUSEA: 0
SHORTNESS OF BREATH: 1
VOMITING: 0
DIARRHEA: 0
RHINORRHEA: 0
BACK PAIN: 0
WHEEZING: 0

## 2024-04-18 NOTE — PROGRESS NOTES
Chief Complaint:   Axel Blankenship is a 83 y.o. male who presents for   Chief Complaint   Patient presents with    3 Month Follow-Up      HPI    Patient is here for a follow up.    He went to the ED on 7/12/23 for dyspnea. Admitted to [facility] 7/12-7/23/23.  Admitted for NSTEMI.  He was noted to have a-fib/a-flutter, acute on chronic systolic CHF.  EF 15-20%.  He had GI bleed. He has not had an ischemia work up.    He has paroxysmal atrial fibrillation. He is taking Eliquis.    He has history of anemia.  Had an EGD with clean based duodenal ulcers.  On Protonix 2 times daily. Dyspnea is improved. He was taking iron but stopped it a week ago.    He has CKD stage 3b..  He has nephrology.     Had urinary retention.  On Flomax.     Mild emphysema on CT.  He has quit smoking.  Did smoke for 66 years.        Review of Systems  Review of Systems   Constitutional:  Positive for fatigue. Negative for activity change and appetite change.   HENT:  Negative for postnasal drip and rhinorrhea.    Respiratory:  Positive for shortness of breath. Negative for wheezing.    Cardiovascular:  Negative for chest pain, palpitations and leg swelling.   Gastrointestinal:  Negative for abdominal pain, diarrhea, nausea and vomiting.        Dark stools   Genitourinary:  Negative for difficulty urinating and frequency.   Musculoskeletal:  Negative for back pain and joint swelling.   Skin:  Negative for rash.   Neurological:  Negative for dizziness and light-headedness.   Psychiatric/Behavioral:  Negative for sleep disturbance.          Allergies  Patient has no known allergies.      Vitals  /80 (Site: Right Upper Arm, Position: Sitting, Cuff Size: Medium Adult)   Pulse 68   Resp 12   Ht 1.829 m (6')   Wt 72.1 kg (159 lb)   BMI 21.56 kg/m²     Current Medications  Current Outpatient Medications   Medication Sig Dispense Refill    levothyroxine (SYNTHROID) 50 MCG tablet Take 1 tablet by mouth daily 30 tablet 1    tamsulosin

## 2024-04-19 LAB
ALBUMIN SERPL-MCNC: 4.1 G/DL (ref 3.4–5)
ALBUMIN/GLOB SERPL: 1.6 {RATIO} (ref 1.1–2.2)
ALP SERPL-CCNC: 122 U/L (ref 40–129)
ALT SERPL-CCNC: 14 U/L (ref 10–40)
ANION GAP SERPL CALCULATED.3IONS-SCNC: 10 MMOL/L (ref 3–16)
AST SERPL-CCNC: 23 U/L (ref 15–37)
BASOPHILS # BLD: 0.1 K/UL (ref 0–0.2)
BASOPHILS NFR BLD: 0.8 %
BILIRUB SERPL-MCNC: 0.4 MG/DL (ref 0–1)
BUN SERPL-MCNC: 36 MG/DL (ref 7–20)
CALCIUM SERPL-MCNC: 9.4 MG/DL (ref 8.3–10.6)
CHLORIDE SERPL-SCNC: 103 MMOL/L (ref 99–110)
CHOLEST SERPL-MCNC: 169 MG/DL (ref 0–199)
CO2 SERPL-SCNC: 29 MMOL/L (ref 21–32)
CREAT SERPL-MCNC: 1.7 MG/DL (ref 0.8–1.3)
DEPRECATED RDW RBC AUTO: 14.3 % (ref 12.4–15.4)
EOSINOPHIL # BLD: 0.2 K/UL (ref 0–0.6)
EOSINOPHIL NFR BLD: 2.4 %
GFR SERPLBLD CREATININE-BSD FMLA CKD-EPI: 39 ML/MIN/{1.73_M2}
GLUCOSE SERPL-MCNC: 82 MG/DL (ref 70–99)
HCT VFR BLD AUTO: 38.6 % (ref 40.5–52.5)
HDLC SERPL-MCNC: 52 MG/DL (ref 40–60)
HGB BLD-MCNC: 12.9 G/DL (ref 13.5–17.5)
LDLC SERPL CALC-MCNC: 100 MG/DL
LYMPHOCYTES # BLD: 1.2 K/UL (ref 1–5.1)
LYMPHOCYTES NFR BLD: 18.6 %
MCH RBC QN AUTO: 31.3 PG (ref 26–34)
MCHC RBC AUTO-ENTMCNC: 33.5 G/DL (ref 31–36)
MCV RBC AUTO: 93.4 FL (ref 80–100)
MONOCYTES # BLD: 0.7 K/UL (ref 0–1.3)
MONOCYTES NFR BLD: 10.3 %
NEUTROPHILS # BLD: 4.5 K/UL (ref 1.7–7.7)
NEUTROPHILS NFR BLD: 67.9 %
PLATELET # BLD AUTO: 240 K/UL (ref 135–450)
PMV BLD AUTO: 9.3 FL (ref 5–10.5)
POTASSIUM SERPL-SCNC: 4.6 MMOL/L (ref 3.5–5.1)
PROT SERPL-MCNC: 6.7 G/DL (ref 6.4–8.2)
RBC # BLD AUTO: 4.14 M/UL (ref 4.2–5.9)
SODIUM SERPL-SCNC: 142 MMOL/L (ref 136–145)
TRIGL SERPL-MCNC: 83 MG/DL (ref 0–150)
TSH SERPL DL<=0.005 MIU/L-ACNC: 3.22 UIU/ML (ref 0.27–4.2)
URATE SERPL-MCNC: 8.7 MG/DL (ref 3.5–7.2)
VLDLC SERPL CALC-MCNC: 17 MG/DL
WBC # BLD AUTO: 6.6 K/UL (ref 4–11)

## 2024-05-02 ENCOUNTER — TELEPHONE (OUTPATIENT)
Dept: INTERNAL MEDICINE CLINIC | Age: 84
End: 2024-05-02

## 2024-05-02 NOTE — TELEPHONE ENCOUNTER
----- Message from Hilary Corral MD sent at 5/2/2024 11:12 AM EDT -----  Contact: argenis [phone]  Ok to wait  ----- Message -----  From: Kiera Luu  Sent: 5/2/2024  10:13 AM EDT  To: Hilary Corral MD    Caller patient niece, states patient has made appt to have ECHO Complete 2D W Doppler W Color on May 30 th. Caller asking if that's okay to wait till then or if you would like to put STAT on order to have an earlier appt. Please advise

## 2024-05-06 ENCOUNTER — TELEPHONE (OUTPATIENT)
Dept: INTERNAL MEDICINE CLINIC | Age: 84
End: 2024-05-06

## 2024-05-06 RX ORDER — FUROSEMIDE 20 MG/1
20 TABLET ORAL DAILY
Qty: 30 TABLET | Refills: 5 | Status: SHIPPED | OUTPATIENT
Start: 2024-05-06

## 2024-05-06 NOTE — TELEPHONE ENCOUNTER
----- Message from Yolanda Gomez sent at 5/6/2024 10:07 AM EDT -----  Contact: patient [phone]  Patient requesting refill on furosemide (LASIX) 20 MG tablet        [facility] [id number] - [place], [place] - [address] - P [phone] - F [phone]  [address] [place] [facility] [address]  Phone: [phone]  Fax: [phone]

## 2024-05-24 RX ORDER — LEVOTHYROXINE SODIUM 0.05 MG/1
50 TABLET ORAL DAILY
Qty: 90 TABLET | Refills: 0 | Status: SHIPPED | OUTPATIENT
Start: 2024-05-24

## 2024-05-28 RX ORDER — TAMSULOSIN HYDROCHLORIDE 0.4 MG/1
0.4 CAPSULE ORAL NIGHTLY
Qty: 90 CAPSULE | Refills: 0 | Status: SHIPPED | OUTPATIENT
Start: 2024-05-28

## 2024-06-10 ENCOUNTER — TELEPHONE (OUTPATIENT)
Dept: INTERNAL MEDICINE CLINIC | Age: 84
End: 2024-06-10

## 2024-06-10 NOTE — TELEPHONE ENCOUNTER
----- Message from Hilary Corral MD sent at 6/10/2024  1:26 PM EDT -----  Contact: [phone]  Okay to refill  ----- Message -----  From: Nuris Quinones MA  Sent: 6/10/2024  10:01 AM EDT  To: Hilary Corral MD    Patient called and is in need of a refill of his apixaban (ELIQUIS) 2.5 MG TABS tablet. It was previously prescribed by his former provider. He is wanting to know if you are willing to write that for him? The current directions are:  Take 1 tablet by mouth 2 times daily. Please advise.       [facility] [id number] - [place] - [address] - P [phone] - F [phone]  [address] [facility] [place] [facility] [address]  Phone: [phone]  Fax: [phone]

## 2024-07-09 ENCOUNTER — TELEPHONE (OUTPATIENT)
Dept: INTERNAL MEDICINE CLINIC | Age: 84
End: 2024-07-09

## 2024-07-09 RX ORDER — METOPROLOL SUCCINATE 25 MG/1
25 TABLET, EXTENDED RELEASE ORAL DAILY
Qty: 30 TABLET | Refills: 5 | Status: SHIPPED | OUTPATIENT
Start: 2024-07-09

## 2024-07-09 NOTE — TELEPHONE ENCOUNTER
----- Message from Nuris Quinones MA sent at 7/9/2024 10:03 AM EDT -----  Contact: [phone]  Patient is requesting a refill of te below medication.       metoprolol succinate (TOPROL XL) 25 MG extended release tablet      [facility] [id number] - [place], [place] - [address] [facility] - P [phone] - F [phone]  [address] [facility] [address]  Phone: [phone]  Fax: [phone]

## 2024-07-15 NOTE — PROGRESS NOTES
ACUTE REHAB UNIT  SPEECH/LANGUAGE PATHOLOGY      [x] Daily  [] Weekly Care Conference Note  [] Discharge    Patient:Axel Blankenship      :1940  OKQ:[id number]  Rehab Dx/Hx: Neela Marquez [R53.81]    Precautions: [x] Aspiration  [x] Fall risk  [] Sternal  [] Seizure [] Hip  [] Weight Bearing [] Other     ST Dx: [] Aphasia  [x] Dysarthria  [] Apraxia   [x] Oropharyngeal dysphagia [x] Cognitive Impairment  [] Other:   Date of Admit: 2023  Room #: 3103/3103-01  Date: 2023          Current Diet Order:ADULT DIET; Regular  ADULT ORAL NUTRITION SUPPLEMENT; Lunch, Breakfast; Standard High Calorie/High Protein Oral Supplement      Compensatory Swallowing Strategies : Alternate solids and liquids, Eat/Feed slowly, Upright as possible for all oral intake, No straws, Effortful swallow, Swallow 2 times per bite/sip, Small bites/sips     Oropharyngeal Phase Impressions: Pt presents with mild to moderate oropharyngeal phase dysphagia. Pt with positive oral acceptance of all PO trials that were self-fed or administered by SLP. Oral phase is characterized by complete labial closure with spoon used to present trials. Due to dentition, pt with prolonged mastication that occurred anteriorly in oral cavity with regular solids, however, was functional. Pt with reduced lingual bolus control with bolus escaping to floor of oral cavity and posterior loss to the level of the pyriform sinuses. With A-P oral bolus transit, pt with mild lingual pumping and trace residue remaining in oral cavity on lingual surface. Pt's swallow was then initiated with head of bolus at the level of the pyriform sinuses for all consistencies trialed. During the swallow, pt with partial laryngeal elevation as arytenoid approximation to the epiglottic petiole was not complete.  Pt also with partial inversion of epiglottis resulting in incomplete laryngeal vestibular closure evidenced by penetration to the level of the vocal folds that occurred during the Private car

## 2024-08-02 ENCOUNTER — OFFICE VISIT (OUTPATIENT)
Dept: INTERNAL MEDICINE CLINIC | Age: 84
End: 2024-08-02

## 2024-08-02 VITALS
HEIGHT: 72 IN | RESPIRATION RATE: 12 BRPM | BODY MASS INDEX: 22.62 KG/M2 | SYSTOLIC BLOOD PRESSURE: 136 MMHG | DIASTOLIC BLOOD PRESSURE: 74 MMHG | HEART RATE: 70 BPM | WEIGHT: 167 LBS

## 2024-08-02 DIAGNOSIS — E03.9 ACQUIRED HYPOTHYROIDISM: ICD-10-CM

## 2024-08-02 DIAGNOSIS — J44.9 CHRONIC OBSTRUCTIVE PULMONARY DISEASE, UNSPECIFIED COPD TYPE (HCC): ICD-10-CM

## 2024-08-02 DIAGNOSIS — N18.32 STAGE 3B CHRONIC KIDNEY DISEASE (HCC): ICD-10-CM

## 2024-08-02 DIAGNOSIS — I25.5 ISCHEMIC CARDIOMYOPATHY: ICD-10-CM

## 2024-08-02 DIAGNOSIS — Z00.00 MEDICARE ANNUAL WELLNESS VISIT, SUBSEQUENT: ICD-10-CM

## 2024-08-02 DIAGNOSIS — I48.0 PAROXYSMAL ATRIAL FIBRILLATION (HCC): ICD-10-CM

## 2024-08-02 DIAGNOSIS — Z00.00 WELCOME TO MEDICARE PREVENTIVE VISIT: Primary | ICD-10-CM

## 2024-08-02 DIAGNOSIS — I10 PRIMARY HYPERTENSION: ICD-10-CM

## 2024-08-02 DIAGNOSIS — D68.69 SECONDARY HYPERCOAGULABLE STATE (HCC): ICD-10-CM

## 2024-08-02 DIAGNOSIS — I34.0 MODERATE MITRAL REGURGITATION: ICD-10-CM

## 2024-08-02 DIAGNOSIS — I50.22 CHRONIC SYSTOLIC (CONGESTIVE) HEART FAILURE (HCC): ICD-10-CM

## 2024-08-02 DIAGNOSIS — I50.9 MILD CONGESTIVE HEART FAILURE (HCC): ICD-10-CM

## 2024-08-02 PROBLEM — I50.41 ACUTE COMBINED SYSTOLIC (CONGESTIVE) AND DIASTOLIC (CONGESTIVE) HEART FAILURE (HCC): Status: RESOLVED | Noted: 2023-07-15 | Resolved: 2024-08-02

## 2024-08-02 PROCEDURE — 1124F ACP DISCUSS-NO DSCNMKR DOCD: CPT | Performed by: INTERNAL MEDICINE

## 2024-08-02 PROCEDURE — G0439 PPPS, SUBSEQ VISIT: HCPCS | Performed by: INTERNAL MEDICINE

## 2024-08-02 PROCEDURE — 3078F DIAST BP <80 MM HG: CPT | Performed by: INTERNAL MEDICINE

## 2024-08-02 PROCEDURE — 3075F SYST BP GE 130 - 139MM HG: CPT | Performed by: INTERNAL MEDICINE

## 2024-08-02 NOTE — PROGRESS NOTES
Medicare Annual Wellness Visit    Axel Blankenship is here for Medicare AWV    Assessment & Plan   Welcome to Medicare preventive visit  Mild congestive heart failure (HCC)  Chronic systolic (congestive) heart failure (HCC)  Paroxysmal atrial fibrillation (HCC)  Ischemic cardiomyopathy  Primary hypertension  Stage 3b chronic kidney disease (HCC)  -     Renal Function Panel; Future  Moderate mitral regurgitation  Secondary hypercoagulable state (HCC)  Acquired hypothyroidism  -     CBC with Auto Differential; Future  -     TSH with Reflex to FT4 ([facility] Only); Future  Medicare annual wellness visit, subsequent  Chronic obstructive pulmonary disease, unspecified COPD type (HCC)      -Medicare exam done  - Chronic systolic CHF stable.  Not interested in any additional medication.  - A fib. In SR. He has a secondary hypercoagulable state  - HTN stable  - Moderate MR. Stable  - Continue synthroid. Check labs.  - COPD not on any inhalers.    Recommendations for Preventive Services Due: see orders and patient instructions/AVS.  Recommended screening schedule for the next 5-10 years is provided to the patient in written form: see Patient Instructions/AVS.     No follow-ups on file.     Subjective       He is here for a Medicare exam.      He had went to the ED on 7/12/23 for dyspnea. Admitted to [facility] 7/12-7/23/23.  Admitted for NSTEMI.  He was noted to have a-fib/a-flutter, acute on chronic systolic CHF.  EF 15-20%.  He had GI bleed. He has not had an ischemia work up.    He has paroxysmal atrial fibrillation. He is taking Eliquis.    He has history of anemia.  Had an EGD with clean based duodenal ulcers.  On Protonix 2 times daily. Dyspnea is improved. He was taking iron but stopped it a week ago.    He has CKD stage 3b..  He has seen nephrology in the past.     Had urinary retention.  On Flomax. He is doing well.     He has hypothyroidism. He takes Synthroid. No diarrhea or constipation.    Mild emphysema on

## 2024-09-03 RX ORDER — TAMSULOSIN HYDROCHLORIDE 0.4 MG/1
0.4 CAPSULE ORAL NIGHTLY
Qty: 90 CAPSULE | Refills: 0 | Status: SHIPPED | OUTPATIENT
Start: 2024-09-03

## 2024-09-03 RX ORDER — LEVOTHYROXINE SODIUM 50 UG/1
50 TABLET ORAL DAILY
Qty: 90 TABLET | Refills: 0 | Status: SHIPPED | OUTPATIENT
Start: 2024-09-03

## 2024-09-19 ENCOUNTER — TELEPHONE (OUTPATIENT)
Dept: INTERNAL MEDICINE CLINIC | Age: 84
End: 2024-09-19

## 2024-09-19 RX ORDER — FUROSEMIDE 20 MG
20 TABLET ORAL DAILY
Qty: 30 TABLET | Refills: 2 | Status: SHIPPED | OUTPATIENT
Start: 2024-09-19

## 2024-12-23 RX ORDER — TAMSULOSIN HYDROCHLORIDE 0.4 MG/1
0.4 CAPSULE ORAL NIGHTLY
Qty: 90 CAPSULE | Refills: 0 | Status: SHIPPED | OUTPATIENT
Start: 2024-12-23

## 2025-05-01 ENCOUNTER — OFFICE VISIT (OUTPATIENT)
Dept: INTERNAL MEDICINE CLINIC | Age: 85
End: 2025-05-01

## 2025-05-01 VITALS
WEIGHT: 166 LBS | HEART RATE: 70 BPM | BODY MASS INDEX: 22.48 KG/M2 | HEIGHT: 72 IN | SYSTOLIC BLOOD PRESSURE: 150 MMHG | DIASTOLIC BLOOD PRESSURE: 90 MMHG | RESPIRATION RATE: 12 BRPM

## 2025-05-01 DIAGNOSIS — D68.69 SECONDARY HYPERCOAGULABLE STATE: ICD-10-CM

## 2025-05-01 DIAGNOSIS — I34.0 MODERATE MITRAL REGURGITATION: ICD-10-CM

## 2025-05-01 DIAGNOSIS — E03.9 ACQUIRED HYPOTHYROIDISM: ICD-10-CM

## 2025-05-01 DIAGNOSIS — I42.9 CARDIOMYOPATHY, UNSPECIFIED TYPE (HCC): Primary | ICD-10-CM

## 2025-05-01 DIAGNOSIS — N18.32 STAGE 3B CHRONIC KIDNEY DISEASE (HCC): ICD-10-CM

## 2025-05-01 DIAGNOSIS — I10 PRIMARY HYPERTENSION: ICD-10-CM

## 2025-05-01 DIAGNOSIS — J44.9 CHRONIC OBSTRUCTIVE PULMONARY DISEASE, UNSPECIFIED COPD TYPE (HCC): ICD-10-CM

## 2025-05-01 DIAGNOSIS — I50.22 CHRONIC SYSTOLIC (CONGESTIVE) HEART FAILURE (HCC): ICD-10-CM

## 2025-05-01 DIAGNOSIS — I48.0 PAROXYSMAL ATRIAL FIBRILLATION (HCC): ICD-10-CM

## 2025-05-01 DIAGNOSIS — I50.22 CHRONIC SYSTOLIC CONGESTIVE HEART FAILURE (HCC): ICD-10-CM

## 2025-05-01 PROCEDURE — 1160F RVW MEDS BY RX/DR IN RCRD: CPT | Performed by: INTERNAL MEDICINE

## 2025-05-01 PROCEDURE — G8420 CALC BMI NORM PARAMETERS: HCPCS | Performed by: INTERNAL MEDICINE

## 2025-05-01 PROCEDURE — 3079F DIAST BP 80-89 MM HG: CPT | Performed by: INTERNAL MEDICINE

## 2025-05-01 PROCEDURE — 1159F MED LIST DOCD IN RCRD: CPT | Performed by: INTERNAL MEDICINE

## 2025-05-01 PROCEDURE — 1036F TOBACCO NON-USER: CPT | Performed by: INTERNAL MEDICINE

## 2025-05-01 PROCEDURE — 3023F SPIROM DOC REV: CPT | Performed by: INTERNAL MEDICINE

## 2025-05-01 PROCEDURE — 1124F ACP DISCUSS-NO DSCNMKR DOCD: CPT | Performed by: INTERNAL MEDICINE

## 2025-05-01 PROCEDURE — 99214 OFFICE O/P EST MOD 30 MIN: CPT | Performed by: INTERNAL MEDICINE

## 2025-05-01 PROCEDURE — 3077F SYST BP >= 140 MM HG: CPT | Performed by: INTERNAL MEDICINE

## 2025-05-01 PROCEDURE — G8427 DOCREV CUR MEDS BY ELIG CLIN: HCPCS | Performed by: INTERNAL MEDICINE

## 2025-05-01 RX ORDER — METOPROLOL SUCCINATE 25 MG/1
25 TABLET, EXTENDED RELEASE ORAL DAILY
Qty: 30 TABLET | Refills: 1 | Status: SHIPPED | OUTPATIENT
Start: 2025-05-01

## 2025-05-01 RX ORDER — LEVOTHYROXINE SODIUM 50 UG/1
50 TABLET ORAL DAILY
Qty: 90 TABLET | Refills: 1 | Status: SHIPPED | OUTPATIENT
Start: 2025-05-01

## 2025-05-01 ASSESSMENT — PATIENT HEALTH QUESTIONNAIRE - PHQ9
SUM OF ALL RESPONSES TO PHQ QUESTIONS 1-9: 0
1. LITTLE INTEREST OR PLEASURE IN DOING THINGS: NOT AT ALL
SUM OF ALL RESPONSES TO PHQ QUESTIONS 1-9: 0
SUM OF ALL RESPONSES TO PHQ QUESTIONS 1-9: 0
2. FEELING DOWN, DEPRESSED OR HOPELESS: NOT AT ALL
1. LITTLE INTEREST OR PLEASURE IN DOING THINGS: NOT AT ALL
SUM OF ALL RESPONSES TO PHQ QUESTIONS 1-9: 0
2. FEELING DOWN, DEPRESSED OR HOPELESS: NOT AT ALL

## 2025-05-01 ASSESSMENT — ENCOUNTER SYMPTOMS
WHEEZING: 0
BACK PAIN: 0
RHINORRHEA: 0
DIARRHEA: 0
VOMITING: 0
ABDOMINAL PAIN: 0
SHORTNESS OF BREATH: 1
NAUSEA: 0

## 2025-05-01 NOTE — PROGRESS NOTES
Chief Complaint:   Axel Blankenship is a 84 y.o. male who presents for   Chief Complaint   Patient presents with    6 Month Follow-Up      HPI    Patient is here for a follow up.    He went to the ED on 7/12/23 for dyspnea. Admitted to [facility] 7/12-7/23/23.  Admitted for NSTEMI.  He was noted to have a-fib/a-flutter, acute on chronic systolic CHF.  EF 15-20%.  He had GI bleed. He has not been taking any of his medications since the beginning of the year.    He has paroxysmal atrial fibrillation. He has not been taking Eliquis.    He has history of anemia.  Had an EGD with clean based duodenal ulcers.  Off PPI. Dyspnea is improved. He was taking iron but stopped it a week ago.    He has CKD stage 3b..  He has nephrology.     Had urinary retention.  On Flomax.     Mild emphysema on CT.  He has quit smoking.  Did smoke for 66 years.        Review of Systems  Review of Systems   Constitutional:  Positive for fatigue. Negative for activity change and appetite change.   HENT:  Negative for postnasal drip and rhinorrhea.    Respiratory:  Positive for shortness of breath. Negative for wheezing.    Cardiovascular:  Negative for chest pain, palpitations and leg swelling.   Gastrointestinal:  Negative for abdominal pain, diarrhea, nausea and vomiting.        Dark stools   Genitourinary:  Negative for difficulty urinating and frequency.   Musculoskeletal:  Negative for back pain and joint swelling.   Skin:  Negative for rash.   Neurological:  Negative for dizziness and light-headedness.   Psychiatric/Behavioral:  Negative for sleep disturbance.          Allergies  Patient has no known allergies.      Vitals  BP (!) 150/90 (BP Site: Right Upper Arm, Patient Position: Supine, BP Cuff Size: Medium Adult)   Pulse 70   Resp 12   Ht 1.829 m (6')   Wt 75.3 kg (166 lb)   BMI 22.51 kg/m²     Current Medications  Current Outpatient Medications   Medication Sig Dispense Refill    levothyroxine (SYNTHROID) 50 MCG tablet Take 1

## 2025-05-02 ENCOUNTER — RESULTS FOLLOW-UP (OUTPATIENT)
Dept: INTERNAL MEDICINE CLINIC | Age: 85
End: 2025-05-02

## 2025-05-02 LAB
ALBUMIN SERPL-MCNC: 4 G/DL (ref 3.4–5)
ALBUMIN/GLOB SERPL: 1.6 {RATIO} (ref 1.1–2.2)
ALP SERPL-CCNC: 97 U/L (ref 40–129)
ALT SERPL-CCNC: 14 U/L (ref 10–40)
ANION GAP SERPL CALCULATED.3IONS-SCNC: 10 MMOL/L (ref 3–16)
AST SERPL-CCNC: 25 U/L (ref 15–37)
BASOPHILS # BLD: 0.1 K/UL (ref 0–0.2)
BASOPHILS NFR BLD: 0.9 %
BILIRUB SERPL-MCNC: 0.5 MG/DL (ref 0–1)
BUN SERPL-MCNC: 19 MG/DL (ref 7–20)
CALCIUM SERPL-MCNC: 9.4 MG/DL (ref 8.3–10.6)
CHLORIDE SERPL-SCNC: 102 MMOL/L (ref 99–110)
CHOLEST SERPL-MCNC: 197 MG/DL (ref 0–199)
CO2 SERPL-SCNC: 26 MMOL/L (ref 21–32)
CREAT SERPL-MCNC: 1.4 MG/DL (ref 0.8–1.3)
DEPRECATED RDW RBC AUTO: 13.9 % (ref 12.4–15.4)
EOSINOPHIL # BLD: 0.1 K/UL (ref 0–0.6)
EOSINOPHIL NFR BLD: 1.5 %
GFR SERPLBLD CREATININE-BSD FMLA CKD-EPI: 49 ML/MIN/{1.73_M2}
GLUCOSE SERPL-MCNC: 86 MG/DL (ref 70–99)
HCT VFR BLD AUTO: 38.1 % (ref 40.5–52.5)
HDLC SERPL-MCNC: 59 MG/DL (ref 40–60)
HGB BLD-MCNC: 13.1 G/DL (ref 13.5–17.5)
LDLC SERPL CALC-MCNC: 117 MG/DL
LYMPHOCYTES # BLD: 1.1 K/UL (ref 1–5.1)
LYMPHOCYTES NFR BLD: 16.9 %
MCH RBC QN AUTO: 31 PG (ref 26–34)
MCHC RBC AUTO-ENTMCNC: 34.4 G/DL (ref 31–36)
MCV RBC AUTO: 90.3 FL (ref 80–100)
MONOCYTES # BLD: 0.6 K/UL (ref 0–1.3)
MONOCYTES NFR BLD: 9 %
NEUTROPHILS # BLD: 4.8 K/UL (ref 1.7–7.7)
NEUTROPHILS NFR BLD: 71.7 %
PLATELET # BLD AUTO: 229 K/UL (ref 135–450)
PMV BLD AUTO: 9.2 FL (ref 5–10.5)
POTASSIUM SERPL-SCNC: 5.1 MMOL/L (ref 3.5–5.1)
PROT SERPL-MCNC: 6.5 G/DL (ref 6.4–8.2)
RBC # BLD AUTO: 4.22 M/UL (ref 4.2–5.9)
SODIUM SERPL-SCNC: 138 MMOL/L (ref 136–145)
TRIGL SERPL-MCNC: 105 MG/DL (ref 0–150)
TSH SERPL DL<=0.005 MIU/L-ACNC: 6.14 UIU/ML (ref 0.27–4.2)
URATE SERPL-MCNC: 6.8 MG/DL (ref 3.5–7.2)
VLDLC SERPL CALC-MCNC: 21 MG/DL
WBC # BLD AUTO: 6.7 K/UL (ref 4–11)

## 2025-05-27 RX ORDER — METOPROLOL SUCCINATE 25 MG/1
25 TABLET, EXTENDED RELEASE ORAL DAILY
Qty: 90 TABLET | Refills: 0 | Status: SHIPPED | OUTPATIENT
Start: 2025-05-27

## 2025-06-05 ENCOUNTER — OFFICE VISIT (OUTPATIENT)
Dept: INTERNAL MEDICINE CLINIC | Age: 85
End: 2025-06-05

## 2025-06-05 VITALS
DIASTOLIC BLOOD PRESSURE: 76 MMHG | HEART RATE: 70 BPM | SYSTOLIC BLOOD PRESSURE: 140 MMHG | RESPIRATION RATE: 14 BRPM | BODY MASS INDEX: 22.21 KG/M2 | HEIGHT: 72 IN | WEIGHT: 164 LBS

## 2025-06-05 DIAGNOSIS — E03.9 ACQUIRED HYPOTHYROIDISM: ICD-10-CM

## 2025-06-05 DIAGNOSIS — I42.9 CARDIOMYOPATHY, UNSPECIFIED TYPE (HCC): ICD-10-CM

## 2025-06-05 DIAGNOSIS — E03.9 ACQUIRED HYPOTHYROIDISM: Primary | ICD-10-CM

## 2025-06-05 DIAGNOSIS — I48.0 PAROXYSMAL ATRIAL FIBRILLATION (HCC): ICD-10-CM

## 2025-06-05 DIAGNOSIS — I10 PRIMARY HYPERTENSION: ICD-10-CM

## 2025-06-05 DIAGNOSIS — I50.22 CHRONIC SYSTOLIC CONGESTIVE HEART FAILURE (HCC): ICD-10-CM

## 2025-06-05 PROCEDURE — G8420 CALC BMI NORM PARAMETERS: HCPCS | Performed by: NURSE PRACTITIONER

## 2025-06-05 PROCEDURE — 1124F ACP DISCUSS-NO DSCNMKR DOCD: CPT | Performed by: NURSE PRACTITIONER

## 2025-06-05 PROCEDURE — 1036F TOBACCO NON-USER: CPT | Performed by: NURSE PRACTITIONER

## 2025-06-05 PROCEDURE — G8427 DOCREV CUR MEDS BY ELIG CLIN: HCPCS | Performed by: NURSE PRACTITIONER

## 2025-06-05 PROCEDURE — 1159F MED LIST DOCD IN RCRD: CPT | Performed by: NURSE PRACTITIONER

## 2025-06-05 PROCEDURE — 3077F SYST BP >= 140 MM HG: CPT | Performed by: NURSE PRACTITIONER

## 2025-06-05 PROCEDURE — 99213 OFFICE O/P EST LOW 20 MIN: CPT | Performed by: NURSE PRACTITIONER

## 2025-06-05 PROCEDURE — 1160F RVW MEDS BY RX/DR IN RCRD: CPT | Performed by: NURSE PRACTITIONER

## 2025-06-05 PROCEDURE — 3078F DIAST BP <80 MM HG: CPT | Performed by: NURSE PRACTITIONER

## 2025-06-05 SDOH — ECONOMIC STABILITY: FOOD INSECURITY: WITHIN THE PAST 12 MONTHS, YOU WORRIED THAT YOUR FOOD WOULD RUN OUT BEFORE YOU GOT MONEY TO BUY MORE.: NEVER TRUE

## 2025-06-05 SDOH — ECONOMIC STABILITY: INCOME INSECURITY: IN THE LAST 12 MONTHS, WAS THERE A TIME WHEN YOU WERE NOT ABLE TO PAY THE MORTGAGE OR RENT ON TIME?: NO

## 2025-06-05 SDOH — ECONOMIC STABILITY: TRANSPORTATION INSECURITY
IN THE PAST 12 MONTHS, HAS THE LACK OF TRANSPORTATION KEPT YOU FROM MEDICAL APPOINTMENTS OR FROM GETTING MEDICATIONS?: NO

## 2025-06-05 SDOH — ECONOMIC STABILITY: FOOD INSECURITY: WITHIN THE PAST 12 MONTHS, THE FOOD YOU BOUGHT JUST DIDN'T LAST AND YOU DIDN'T HAVE MONEY TO GET MORE.: NEVER TRUE

## 2025-06-05 NOTE — PROGRESS NOTES
Levothyroxine 50 mg    Chronic combined Systolic CHF  -EF 15-20%, moderate MR, pulmonary hypertension  -An ECHO was ordered, but he never did it.   -saw Cardiology for follow up.  -refused Lifevest.  -not taking any meds.   -no ACE/ARNI, MRA, or SGLT2 due to CKD  - Cannot use ACE inhibitor or ARB/aldactone due to CKD.     Paroxysmal a-fib  -refusing anti coagulation.  Refused coumadin  -resumed Toprol-XL. He is taking this.     Hypertension- uncontrolled.  -resume Toprol XL.   -continue this.  Monitor BP at home.  -Encouraged compliance with medications.      Anemia  -prior GI bleed  -had EGD with clean based ulcers     Hyperlipidemia  -Refuses statin     COPD  -stable. No AE  -quit smoking  -given Combivent     Left hydronephrosis  Urinary retention  -not taking Flomax.     AAA  -2.9 cm on CT.    Return in about 3 months (around 9/5/2025), or if symptoms worsen or fail to improve.    Valencia Lo FNP-C

## 2025-06-06 LAB — TSH SERPL DL<=0.005 MIU/L-ACNC: 2.95 UIU/ML (ref 0.27–4.2)

## 2025-06-09 ENCOUNTER — RESULTS FOLLOW-UP (OUTPATIENT)
Dept: INTERNAL MEDICINE CLINIC | Age: 85
End: 2025-06-09

## 2025-09-03 ENCOUNTER — OFFICE VISIT (OUTPATIENT)
Dept: INTERNAL MEDICINE CLINIC | Age: 85
End: 2025-09-03

## 2025-09-03 VITALS
HEART RATE: 70 BPM | RESPIRATION RATE: 12 BRPM | SYSTOLIC BLOOD PRESSURE: 150 MMHG | HEIGHT: 72 IN | BODY MASS INDEX: 21.94 KG/M2 | DIASTOLIC BLOOD PRESSURE: 80 MMHG | WEIGHT: 162 LBS

## 2025-09-03 DIAGNOSIS — I50.22 CHRONIC SYSTOLIC (CONGESTIVE) HEART FAILURE (HCC): ICD-10-CM

## 2025-09-03 DIAGNOSIS — I34.0 MODERATE MITRAL REGURGITATION: ICD-10-CM

## 2025-09-03 DIAGNOSIS — Z00.00 MEDICARE ANNUAL WELLNESS VISIT, SUBSEQUENT: Primary | ICD-10-CM

## 2025-09-03 DIAGNOSIS — I48.0 PAROXYSMAL ATRIAL FIBRILLATION (HCC): ICD-10-CM

## 2025-09-03 DIAGNOSIS — E03.9 ACQUIRED HYPOTHYROIDISM: ICD-10-CM

## 2025-09-03 DIAGNOSIS — I25.5 ISCHEMIC CARDIOMYOPATHY: ICD-10-CM

## 2025-09-03 DIAGNOSIS — J44.9 CHRONIC OBSTRUCTIVE PULMONARY DISEASE, UNSPECIFIED COPD TYPE (HCC): ICD-10-CM

## 2025-09-03 PROCEDURE — 1124F ACP DISCUSS-NO DSCNMKR DOCD: CPT | Performed by: INTERNAL MEDICINE

## 2025-09-03 PROCEDURE — 3077F SYST BP >= 140 MM HG: CPT | Performed by: INTERNAL MEDICINE

## 2025-09-03 PROCEDURE — 3079F DIAST BP 80-89 MM HG: CPT | Performed by: INTERNAL MEDICINE

## 2025-09-03 PROCEDURE — 1159F MED LIST DOCD IN RCRD: CPT | Performed by: INTERNAL MEDICINE

## 2025-09-03 PROCEDURE — G0439 PPPS, SUBSEQ VISIT: HCPCS | Performed by: INTERNAL MEDICINE

## 2025-09-03 PROCEDURE — 1160F RVW MEDS BY RX/DR IN RCRD: CPT | Performed by: INTERNAL MEDICINE

## 2025-09-03 RX ORDER — METOPROLOL SUCCINATE 50 MG/1
50 TABLET, EXTENDED RELEASE ORAL DAILY
Qty: 90 TABLET | Refills: 1 | Status: SHIPPED | OUTPATIENT
Start: 2025-09-03 | End: 2026-03-02

## 2025-09-03 ASSESSMENT — PATIENT HEALTH QUESTIONNAIRE - PHQ9
SUM OF ALL RESPONSES TO PHQ QUESTIONS 1-9: 0
1. LITTLE INTEREST OR PLEASURE IN DOING THINGS: NOT AT ALL
2. FEELING DOWN, DEPRESSED OR HOPELESS: NOT AT ALL
SUM OF ALL RESPONSES TO PHQ QUESTIONS 1-9: 0

## 2025-09-03 ASSESSMENT — LIFESTYLE VARIABLES
HOW OFTEN DO YOU HAVE A DRINK CONTAINING ALCOHOL: NEVER
HOW MANY STANDARD DRINKS CONTAINING ALCOHOL DO YOU HAVE ON A TYPICAL DAY: PATIENT DOES NOT DRINK

## (undated) DEVICE — FORCEPS BX L240CM JAW DIA2.4MM ORNG L CAP W/ NDL DISP RAD

## (undated) DEVICE — MASK O2 ADULT POM PROCEDURAL OXYGEN MASK 7FT O2 TUBING 10FT